# Patient Record
Sex: FEMALE | Race: WHITE | URBAN - METROPOLITAN AREA
[De-identification: names, ages, dates, MRNs, and addresses within clinical notes are randomized per-mention and may not be internally consistent; named-entity substitution may affect disease eponyms.]

---

## 2017-01-23 ENCOUNTER — MED REC SCAN ONLY (OUTPATIENT)
Dept: FAMILY MEDICINE CLINIC | Facility: CLINIC | Age: 77
End: 2017-01-23

## 2017-02-10 ENCOUNTER — ANTI-COAG VISIT (OUTPATIENT)
Dept: FAMILY MEDICINE CLINIC | Facility: CLINIC | Age: 77
End: 2017-02-10

## 2017-02-10 DIAGNOSIS — Z79.01 LONG TERM (CURRENT) USE OF ANTICOAGULANTS: Primary | ICD-10-CM

## 2017-02-10 DIAGNOSIS — I48.91 ATRIAL FIBRILLATION, UNSPECIFIED TYPE (HCC): ICD-10-CM

## 2017-02-10 LAB — INR: 1.9 (ref 0.8–1.2)

## 2017-02-10 PROCEDURE — 99211 OFF/OP EST MAY X REQ PHY/QHP: CPT | Performed by: FAMILY MEDICINE

## 2017-02-10 PROCEDURE — 85610 PROTHROMBIN TIME: CPT | Performed by: FAMILY MEDICINE

## 2017-02-10 NOTE — PROGRESS NOTES
INR just slightly low today. Used protonix for a few days and ate a moderate sized salad yesterday. INR last month was 2.7. Increase coumadin dose today as above. Return for INR in one month.

## 2017-02-15 ENCOUNTER — OFFICE VISIT (OUTPATIENT)
Dept: FAMILY MEDICINE CLINIC | Facility: CLINIC | Age: 77
End: 2017-02-15

## 2017-02-15 VITALS
BODY MASS INDEX: 42.36 KG/M2 | HEART RATE: 84 BPM | HEIGHT: 61 IN | TEMPERATURE: 97 F | SYSTOLIC BLOOD PRESSURE: 122 MMHG | WEIGHT: 224.38 LBS | DIASTOLIC BLOOD PRESSURE: 70 MMHG

## 2017-02-15 DIAGNOSIS — L01.00 IMPETIGO: Primary | ICD-10-CM

## 2017-02-15 PROBLEM — Z98.890 S/P BUNIONECTOMY: Status: ACTIVE | Noted: 2017-02-15

## 2017-02-15 PROCEDURE — 99213 OFFICE O/P EST LOW 20 MIN: CPT | Performed by: NURSE PRACTITIONER

## 2017-02-15 RX ORDER — WARFARIN SODIUM 1 MG/1
1 TABLET ORAL
COMMUNITY
Start: 2015-08-27 | End: 2017-05-30

## 2017-02-15 RX ORDER — MUPIROCIN CALCIUM 20 MG/G
1 CREAM TOPICAL 2 TIMES DAILY
Qty: 30 G | Refills: 0 | Status: SHIPPED | OUTPATIENT
Start: 2017-02-15 | End: 2017-02-22

## 2017-02-15 RX ORDER — CLOTRIMAZOLE AND BETAMETHASONE DIPROPIONATE 10; .64 MG/G; MG/G
CREAM TOPICAL
COMMUNITY
Start: 2014-08-29 | End: 2017-04-20 | Stop reason: ALTCHOICE

## 2017-02-15 RX ORDER — LOVASTATIN 20 MG/1
20 TABLET ORAL DAILY
COMMUNITY
Start: 2009-05-27 | End: 2017-05-04

## 2017-02-15 RX ORDER — LISINOPRIL 10 MG/1
10 TABLET ORAL DAILY
COMMUNITY
Start: 2008-06-25 | End: 2017-03-24

## 2017-02-15 RX ORDER — FUROSEMIDE 20 MG/1
20 TABLET ORAL DAILY
COMMUNITY
Start: 2015-06-23 | End: 2017-07-03

## 2017-02-15 RX ORDER — PANTOPRAZOLE SODIUM 40 MG/1
40 TABLET, DELAYED RELEASE ORAL DAILY
COMMUNITY
Start: 2013-06-21 | End: 2017-03-09 | Stop reason: ALTCHOICE

## 2017-02-15 RX ORDER — DILTIAZEM HYDROCHLORIDE 60 MG/1
60 TABLET, FILM COATED ORAL DAILY
COMMUNITY
Start: 2013-12-30 | End: 2018-03-16 | Stop reason: ALTCHOICE

## 2017-02-15 RX ORDER — MUPIROCIN CALCIUM 20 MG/G
1 CREAM TOPICAL 2 TIMES DAILY
Qty: 30 G | Refills: 0 | Status: SHIPPED | OUTPATIENT
Start: 2017-02-15 | End: 2017-02-15

## 2017-02-15 NOTE — PATIENT INSTRUCTIONS
Wash area with gentle soap without scent twice a day. Antibiotic ointment to affected area and nostrils 2 times a day for 7 days. This should not interact with coumadin. Vasoline to dry areas at night to help moisturize.   Avoid new lotions or make-ups to

## 2017-02-15 NOTE — PROGRESS NOTES
Danielle Serrano is a 77year old female. HPI:   C/o rash on face x 1-2 weeks around nose and in nose. States it is pruritic and burns inside nose and around nares. Notes some crusting of area, unsure about any drainage or exudate.  Had runny nose over denies shortness of breath with exertion  CARDIOVASCULAR: denies chest pain on exertion, hx a. fib  GI: denies abdominal pain and denies heartburn  NEURO: denies headaches    EXAM:   /70 mmHg  Pulse 84  Temp(Src) 97 °F (36.1 °C) (Tympanic)  Ht 61\"

## 2017-03-06 ENCOUNTER — TELEPHONE (OUTPATIENT)
Dept: FAMILY MEDICINE CLINIC | Facility: CLINIC | Age: 77
End: 2017-03-06

## 2017-03-06 NOTE — TELEPHONE ENCOUNTER
Seen in office by ZAHRAA Ram on 2/15/17 for impetigo. Prescribed topical bactroban. Now worsening after use. Now with red blotches around nose, above lips and on eyelids. Red areas itchy.  Denies difficulty breathing, swelling in mouth or throat, lesio

## 2017-03-06 NOTE — TELEPHONE ENCOUNTER
See note below, states she started on Lasix 2 wks ago and that is approx when s/s started. Pt states eye lids are puffy and swollen above lips- in location of rash.   Appt scheduled  With Allison Adrian NP tomorrow morning-  Pt urged to call for later today if n

## 2017-03-07 ENCOUNTER — TELEPHONE (OUTPATIENT)
Dept: FAMILY MEDICINE CLINIC | Facility: CLINIC | Age: 77
End: 2017-03-07

## 2017-03-07 ENCOUNTER — OFFICE VISIT (OUTPATIENT)
Dept: FAMILY MEDICINE CLINIC | Facility: CLINIC | Age: 77
End: 2017-03-07

## 2017-03-07 ENCOUNTER — LAB ENCOUNTER (OUTPATIENT)
Dept: LAB | Age: 77
End: 2017-03-07
Attending: FAMILY MEDICINE
Payer: COMMERCIAL

## 2017-03-07 VITALS
DIASTOLIC BLOOD PRESSURE: 78 MMHG | RESPIRATION RATE: 16 BRPM | HEIGHT: 60.5 IN | TEMPERATURE: 98 F | BODY MASS INDEX: 41.89 KG/M2 | HEART RATE: 78 BPM | WEIGHT: 219 LBS | SYSTOLIC BLOOD PRESSURE: 136 MMHG

## 2017-03-07 DIAGNOSIS — B96.89 BACTERIAL CONJUNCTIVITIS OF BOTH EYES: ICD-10-CM

## 2017-03-07 DIAGNOSIS — H10.9 BACTERIAL CONJUNCTIVITIS OF BOTH EYES: ICD-10-CM

## 2017-03-07 DIAGNOSIS — L30.9 DERMATITIS: ICD-10-CM

## 2017-03-07 DIAGNOSIS — H60.93 OTITIS EXTERNA OF BOTH EARS, UNSPECIFIED CHRONICITY, UNSPECIFIED TYPE: Primary | ICD-10-CM

## 2017-03-07 LAB
ALBUMIN SERPL-MCNC: 4.1 G/DL (ref 3.5–4.8)
ALP LIVER SERPL-CCNC: 69 U/L (ref 55–142)
ALT SERPL-CCNC: 26 U/L (ref 14–54)
AST SERPL-CCNC: 17 U/L (ref 15–41)
BASOPHILS # BLD AUTO: 0.06 X10(3) UL (ref 0–0.1)
BASOPHILS NFR BLD AUTO: 0.7 %
BILIRUB SERPL-MCNC: 0.4 MG/DL (ref 0.1–2)
BUN BLD-MCNC: 15 MG/DL (ref 8–20)
CALCIUM BLD-MCNC: 9.7 MG/DL (ref 8.3–10.3)
CHLORIDE: 102 MMOL/L (ref 101–111)
CO2: 30 MMOL/L (ref 22–32)
CREAT BLD-MCNC: 0.79 MG/DL (ref 0.55–1.02)
EOSINOPHIL # BLD AUTO: 0.28 X10(3) UL (ref 0–0.3)
EOSINOPHIL NFR BLD AUTO: 3.3 %
ERYTHROCYTE [DISTWIDTH] IN BLOOD BY AUTOMATED COUNT: 12.3 % (ref 11.5–16)
GLUCOSE BLD-MCNC: 98 MG/DL (ref 70–99)
HCT VFR BLD AUTO: 45.8 % (ref 34–50)
HGB BLD-MCNC: 15.3 G/DL (ref 12–16)
IMMATURE GRANULOCYTE COUNT: 0.02 X10(3) UL (ref 0–1)
IMMATURE GRANULOCYTE RATIO %: 0.2 %
LYMPHOCYTES # BLD AUTO: 1.67 X10(3) UL (ref 0.9–4)
LYMPHOCYTES NFR BLD AUTO: 19.4 %
M PROTEIN MFR SERPL ELPH: 8.3 G/DL (ref 6.1–8.3)
MCH RBC QN AUTO: 30.2 PG (ref 27–33.2)
MCHC RBC AUTO-ENTMCNC: 33.4 G/DL (ref 31–37)
MCV RBC AUTO: 90.5 FL (ref 81–100)
MONOCYTES # BLD AUTO: 0.63 X10(3) UL (ref 0.1–0.6)
MONOCYTES NFR BLD AUTO: 7.3 %
NEUTROPHIL ABS PRELIM: 5.95 X10 (3) UL (ref 1.3–6.7)
NEUTROPHILS # BLD AUTO: 5.95 X10(3) UL (ref 1.3–6.7)
NEUTROPHILS NFR BLD AUTO: 69.1 %
PLATELET # BLD AUTO: 303 10(3)UL (ref 150–450)
POTASSIUM SERPL-SCNC: 4.6 MMOL/L (ref 3.6–5.1)
RBC # BLD AUTO: 5.06 X10(6)UL (ref 3.8–5.1)
RED CELL DISTRIBUTION WIDTH-SD: 40.7 FL (ref 35.1–46.3)
SODIUM SERPL-SCNC: 138 MMOL/L (ref 136–144)
WBC # BLD AUTO: 8.6 X10(3) UL (ref 4–13)

## 2017-03-07 PROCEDURE — 99214 OFFICE O/P EST MOD 30 MIN: CPT | Performed by: NURSE PRACTITIONER

## 2017-03-07 PROCEDURE — 80053 COMPREHEN METABOLIC PANEL: CPT

## 2017-03-07 PROCEDURE — 85025 COMPLETE CBC W/AUTO DIFF WBC: CPT

## 2017-03-07 RX ORDER — OFLOXACIN 3 MG/ML
10 SOLUTION AURICULAR (OTIC) DAILY
Qty: 5 ML | Refills: 0 | Status: SHIPPED | OUTPATIENT
Start: 2017-03-07 | End: 2017-03-09

## 2017-03-07 RX ORDER — HYDROCORTISONE 25 MG/ML
1 LOTION TOPICAL 2 TIMES DAILY
Qty: 59 ML | Refills: 0 | Status: SHIPPED | OUTPATIENT
Start: 2017-03-07 | End: 2021-01-21

## 2017-03-07 RX ORDER — METHYLPREDNISOLONE 4 MG/1
TABLET ORAL
Qty: 1 KIT | Refills: 0 | Status: SHIPPED | OUTPATIENT
Start: 2017-03-07 | End: 2017-04-20 | Stop reason: ALTCHOICE

## 2017-03-07 RX ORDER — POLYMYXIN B SULFATE AND TRIMETHOPRIM 1; 10000 MG/ML; [USP'U]/ML
1 SOLUTION OPHTHALMIC EVERY 4 HOURS
Qty: 10 ML | Refills: 0 | Status: SHIPPED | OUTPATIENT
Start: 2017-03-07 | End: 2017-03-14

## 2017-03-07 NOTE — PROGRESS NOTES
Winter Seo is a 77year old female. HPI:   Pt presents for c/o: Rash above the lip and eyelids x 2-3 weeks. Pt reports bilateral eye redness and pruritis. No eye d/c or crusting, denies vision changes, eye pain with movement, fevers, chills.  No Cream Apply topically. Apply to affected are tid as needed Disp:  Rfl:    DiltiaZEM HCl (CARDIZEM) 60 MG Oral Tab Take 60 mg by mouth daily. Disp:  Rfl:    lisinopril 10 MG Oral Tab Take 10 mg by mouth daily.  Disp:  Rfl:    Lovastatin 20 MG Oral Tab Take 2 auscultation, no crackles, wheezing, rhonchi  CARDIO:irregular rate without murmur  EXTREMITIES: no cyanosis, clubbing or edema    ASSESSMENT AND PLAN:   Otitis externa bilateral ears  Dermatitis  Bacterial conjunctivitis bilaterally      Polymyxin B/trime

## 2017-03-07 NOTE — TELEPHONE ENCOUNTER
Patient called back. She located the directions on the box of medication. No further questions at this time.

## 2017-03-07 NOTE — PATIENT INSTRUCTIONS
Azithromycin eye drops as prescribed. Ofloxacin ear drops as prescribed. Medrol dose as prescribed for itching, inflammation, and swelling. Hydrocortisone cream to upper lip twice a day x 2 weeks. Eucerin cream otc for moisturizing face.   Follow up in

## 2017-03-08 ENCOUNTER — TELEPHONE (OUTPATIENT)
Dept: FAMILY MEDICINE CLINIC | Facility: CLINIC | Age: 77
End: 2017-03-08

## 2017-03-08 NOTE — TELEPHONE ENCOUNTER
----- Message from ZAHRAA Mccray sent at 3/8/2017  7:00 AM CST -----  Blood work WNLs except for monocyte absolute level slightly elevated--this is more than likely due to the inflammation and irritation skin, eyes, and ears as discussed in last OV.    P

## 2017-03-08 NOTE — TELEPHONE ENCOUNTER
Noted, thank you. Pt to start antibiotic gtts for ears and eyes today and use steroid cream as prescribed. Continue with plan of care as discussed in LOV.  Pt to got to ER if worsening eye swelling, eye pain, vision changes, fever, chills, n/v/d, or tro

## 2017-03-08 NOTE — TELEPHONE ENCOUNTER
Patient advised. States is still itching. She thinks that the steroids are helping. Nose and around mouth are itching. Right eye is still swollen. Not having any problems with meds. Took Benadryl last night.   States that she has not started eye renetta

## 2017-03-09 ENCOUNTER — OFFICE VISIT (OUTPATIENT)
Dept: FAMILY MEDICINE CLINIC | Facility: CLINIC | Age: 77
End: 2017-03-09

## 2017-03-09 ENCOUNTER — TELEPHONE (OUTPATIENT)
Dept: FAMILY MEDICINE CLINIC | Facility: CLINIC | Age: 77
End: 2017-03-09

## 2017-03-09 ENCOUNTER — ANTI-COAG VISIT (OUTPATIENT)
Dept: FAMILY MEDICINE CLINIC | Facility: CLINIC | Age: 77
End: 2017-03-09

## 2017-03-09 VITALS
HEART RATE: 70 BPM | SYSTOLIC BLOOD PRESSURE: 110 MMHG | TEMPERATURE: 99 F | WEIGHT: 218.38 LBS | DIASTOLIC BLOOD PRESSURE: 62 MMHG | HEIGHT: 61 IN | OXYGEN SATURATION: 97 % | BODY MASS INDEX: 41.23 KG/M2

## 2017-03-09 DIAGNOSIS — L30.9 DERMATITIS: ICD-10-CM

## 2017-03-09 DIAGNOSIS — Z79.01 LONG TERM (CURRENT) USE OF ANTICOAGULANTS: Primary | ICD-10-CM

## 2017-03-09 DIAGNOSIS — H10.9 BACTERIAL CONJUNCTIVITIS OF BOTH EYES: ICD-10-CM

## 2017-03-09 DIAGNOSIS — B96.89 BACTERIAL CONJUNCTIVITIS OF BOTH EYES: ICD-10-CM

## 2017-03-09 DIAGNOSIS — Z09 FOLLOW UP: Primary | ICD-10-CM

## 2017-03-09 DIAGNOSIS — H60.93 OTITIS EXTERNA OF BOTH EARS, UNSPECIFIED CHRONICITY, UNSPECIFIED TYPE: ICD-10-CM

## 2017-03-09 DIAGNOSIS — I48.91 ATRIAL FIBRILLATION, UNSPECIFIED TYPE (HCC): ICD-10-CM

## 2017-03-09 LAB — INR: 2.8 (ref 0.8–1.2)

## 2017-03-09 PROCEDURE — 85610 PROTHROMBIN TIME: CPT | Performed by: FAMILY MEDICINE

## 2017-03-09 PROCEDURE — 99213 OFFICE O/P EST LOW 20 MIN: CPT | Performed by: NURSE PRACTITIONER

## 2017-03-09 NOTE — PROGRESS NOTES
INR in goal range. Just started prednisone and benadryl. Will keep coumadin dose the same for now. INR in 2 days.

## 2017-03-09 NOTE — PROGRESS NOTES
Bassem Aiken is a 77year old female. HPI:   Pt presents for f/u on dermatitis on face, otitis externa bilaterally, and bacterial conjunctivitis bilaterally.  Pt started medrol dose pack 2 days ago, started antibiotic eyedrops yesterday evening, morning. Patient is asking how much water she should be drinking, as she thinks she was instructed to increase water intake while on the water pill.   Denies any dysuria, suprapubic tenderness, back pain, nausea, vomiting, diarrhea, chest pain, shortness o denies abdominal pain and denies heartburn  NEURO: denies headaches    EXAM:   /62 mmHg  Pulse 70  Temp(Src) 98.9 °F (37.2 °C) (Tympanic)  Ht 61\"  Wt 218 lb 6 oz  BMI 41.28 kg/m2  SpO2 97%  GENERAL: well developed, well nourished,in no apparent dist skin.  Continue to hold on using bilateral hearing aids to let ear canals breathes while there is infection present. The patient indicates understanding of these issues and agrees to the plan. The patient is asked to return in  5 days.     Shane Herrera

## 2017-03-09 NOTE — TELEPHONE ENCOUNTER
Informed pt of the recommendations. Pt will go to er if sxs worsen. Pt expressed understanding and thanks.

## 2017-03-09 NOTE — PATIENT INSTRUCTIONS
Finish medrol dose pack, take with food. Continue benadryl at night. Continue antibiotic ear drops x 7 days and eye drops x 7 days. Eucerin cream a couple times a day for moisturizing face. Steroid lotion twice a day for total of 2 weeks.   Return in 5

## 2017-03-11 ENCOUNTER — ANTI-COAG VISIT (OUTPATIENT)
Dept: FAMILY MEDICINE CLINIC | Facility: CLINIC | Age: 77
End: 2017-03-11

## 2017-03-11 DIAGNOSIS — Z79.01 LONG TERM (CURRENT) USE OF ANTICOAGULANTS: Primary | ICD-10-CM

## 2017-03-11 DIAGNOSIS — I48.91 ATRIAL FIBRILLATION, UNSPECIFIED TYPE (HCC): ICD-10-CM

## 2017-03-11 LAB — INR: 3.3 (ref 0.8–1.2)

## 2017-03-11 PROCEDURE — 85610 PROTHROMBIN TIME: CPT | Performed by: FAMILY MEDICINE

## 2017-03-11 PROCEDURE — 99211 OFF/OP EST MAY X REQ PHY/QHP: CPT | Performed by: FAMILY MEDICINE

## 2017-03-11 NOTE — PATIENT INSTRUCTIONS
INR is starting to climb on new meds. Eat cabbage tonight or another dark leafy green vegtable. Decrease coumadin dose today. Follow dosing calender.

## 2017-03-11 NOTE — PROGRESS NOTES
INR 3.3, climbing on prednisone. Has one days dose of prednisone left. Current coumadin dose 8mg daily. Take coumadin 4mg today, then resume 8mg daily.    Next INR in 4 days when she comes in for f/u visit with Skylar Ramirez, NP

## 2017-03-15 ENCOUNTER — OFFICE VISIT (OUTPATIENT)
Dept: FAMILY MEDICINE CLINIC | Facility: CLINIC | Age: 77
End: 2017-03-15

## 2017-03-15 ENCOUNTER — ANTI-COAG VISIT (OUTPATIENT)
Dept: FAMILY MEDICINE CLINIC | Facility: CLINIC | Age: 77
End: 2017-03-15

## 2017-03-15 VITALS
DIASTOLIC BLOOD PRESSURE: 62 MMHG | TEMPERATURE: 97 F | BODY MASS INDEX: 42.27 KG/M2 | RESPIRATION RATE: 16 BRPM | HEIGHT: 60.5 IN | WEIGHT: 221 LBS | HEART RATE: 70 BPM | SYSTOLIC BLOOD PRESSURE: 134 MMHG

## 2017-03-15 DIAGNOSIS — H60.93 OTITIS EXTERNA OF BOTH EARS, UNSPECIFIED CHRONICITY, UNSPECIFIED TYPE: ICD-10-CM

## 2017-03-15 DIAGNOSIS — H10.9 CONJUNCTIVITIS OF BOTH EYES, UNSPECIFIED CONJUNCTIVITIS TYPE: ICD-10-CM

## 2017-03-15 DIAGNOSIS — Z09 FOLLOW UP: ICD-10-CM

## 2017-03-15 DIAGNOSIS — I48.91 ATRIAL FIBRILLATION, UNSPECIFIED TYPE (HCC): ICD-10-CM

## 2017-03-15 DIAGNOSIS — L30.9 DERMATITIS: Primary | ICD-10-CM

## 2017-03-15 DIAGNOSIS — Z79.01 LONG TERM (CURRENT) USE OF ANTICOAGULANTS: Primary | ICD-10-CM

## 2017-03-15 LAB — INR: 3.6 (ref 0.8–1.2)

## 2017-03-15 PROCEDURE — 99213 OFFICE O/P EST LOW 20 MIN: CPT | Performed by: NURSE PRACTITIONER

## 2017-03-15 PROCEDURE — 85610 PROTHROMBIN TIME: CPT | Performed by: FAMILY MEDICINE

## 2017-03-15 NOTE — PATIENT INSTRUCTIONS
INR elevated today, hold coumadin tonight, resume regular coumadin dose of 8mg daily. Recheck INR in 2-3 days. Monitor for signs of bleeding. Continue avoiding the earring, travel pillow, and other potential irritants. Finish ear drops completely.    Sto

## 2017-03-15 NOTE — PROGRESS NOTES
Angelo Hinojosa is a 77year old female. HPI:   Pt presents for f/u on dermatitis on face, otitis externa bilaterally, and bacterial conjunctivitis bilaterally.  Pt has completed medrol dosepak and has one day left of antibioitic ear drops --tolera sparingly to affected area twice a day as needed Disp: 59 mL Rfl: 0   DiltiaZEM HCl (CARDIZEM) 60 MG Oral Tab Take 60 mg by mouth daily. Disp:  Rfl:    lisinopril 10 MG Oral Tab Take 10 mg by mouth daily.  Disp:  Rfl:    Lovastatin 20 MG Oral Tab Take 20 mg noted, no edema or erythematous area around eyes. NECK: supple,no adenopathy  LUNGS: clear to auscultation, no crackles, wheezing, rhonchi  CARDIO:irregular rate without murmur  EXTREMITIES: no cyanosis, clubbing.  Mild trace pitting bilateral lower extrem

## 2017-03-17 ENCOUNTER — ANTI-COAG VISIT (OUTPATIENT)
Dept: FAMILY MEDICINE CLINIC | Facility: CLINIC | Age: 77
End: 2017-03-17

## 2017-03-17 DIAGNOSIS — I48.20 CHRONIC ATRIAL FIBRILLATION (HCC): ICD-10-CM

## 2017-03-17 DIAGNOSIS — Z79.01 LONG TERM (CURRENT) USE OF ANTICOAGULANTS: Primary | ICD-10-CM

## 2017-03-17 LAB — INR: 1.8 (ref 0.8–1.2)

## 2017-03-17 PROCEDURE — 85610 PROTHROMBIN TIME: CPT | Performed by: FAMILY MEDICINE

## 2017-03-17 PROCEDURE — 99211 OFF/OP EST MAY X REQ PHY/QHP: CPT | Performed by: FAMILY MEDICINE

## 2017-03-17 NOTE — PROGRESS NOTES
Patient given coumadin instructions per Ginny Clark  Patient expressed understanding  Patient given AVS with coumadin dose calendar    Terrilyn Meigs Minor, 03/17/2017, 1:27 PM

## 2017-03-17 NOTE — PROGRESS NOTES
INR low today. Take 10 mg today, then resume 8 mg daily. Recheck next week.  Ginny Clark, 03/17/2017, 1:21 PM

## 2017-03-17 NOTE — PROGRESS NOTES
[place] INR 1.8  Was 3.6 on 3/15/17  Held coumadin on 3/15/17 but took 8 mg last night  Please manage coumadin  Patient normal dose is 8 mg daily    Evelin Sapp, 03/17/2017, 1:13 PM

## 2017-03-24 ENCOUNTER — ANTI-COAG VISIT (OUTPATIENT)
Dept: FAMILY MEDICINE CLINIC | Facility: CLINIC | Age: 77
End: 2017-03-24

## 2017-03-24 DIAGNOSIS — Z71.1 PERSON WITH FEARED COMPLAINT IN WHOM NO DIAGNOSIS WAS MADE: ICD-10-CM

## 2017-03-24 DIAGNOSIS — Z79.01 LONG TERM (CURRENT) USE OF ANTICOAGULANTS: Primary | ICD-10-CM

## 2017-03-24 DIAGNOSIS — I48.20 CHRONIC ATRIAL FIBRILLATION (HCC): ICD-10-CM

## 2017-03-24 LAB — INR: 2.6 (ref 0.8–1.2)

## 2017-03-24 PROCEDURE — 99211 OFF/OP EST MAY X REQ PHY/QHP: CPT | Performed by: FAMILY MEDICINE

## 2017-03-24 PROCEDURE — 85610 PROTHROMBIN TIME: CPT | Performed by: FAMILY MEDICINE

## 2017-03-24 RX ORDER — LISINOPRIL 10 MG/1
TABLET ORAL
Qty: 90 TABLET | Refills: 0 | Status: SHIPPED | OUTPATIENT
Start: 2017-03-24 | End: 2017-07-03

## 2017-03-24 NOTE — TELEPHONE ENCOUNTER
Future Appointments  Date Time Provider Cora Palafox   4/7/2017 9:00 AM [facility] COUMADIN NURSE [facility] [place] [facility] [place]

## 2017-04-07 ENCOUNTER — ANTI-COAG VISIT (OUTPATIENT)
Dept: FAMILY MEDICINE CLINIC | Facility: CLINIC | Age: 77
End: 2017-04-07

## 2017-04-07 VITALS
TEMPERATURE: 98 F | DIASTOLIC BLOOD PRESSURE: 74 MMHG | HEART RATE: 62 BPM | RESPIRATION RATE: 14 BRPM | SYSTOLIC BLOOD PRESSURE: 130 MMHG

## 2017-04-07 DIAGNOSIS — Z79.01 LONG TERM (CURRENT) USE OF ANTICOAGULANTS: Primary | ICD-10-CM

## 2017-04-07 DIAGNOSIS — I48.20 CHRONIC ATRIAL FIBRILLATION (HCC): ICD-10-CM

## 2017-04-07 PROCEDURE — 99211 OFF/OP EST MAY X REQ PHY/QHP: CPT | Performed by: FAMILY MEDICINE

## 2017-04-07 PROCEDURE — 85610 PROTHROMBIN TIME: CPT | Performed by: FAMILY MEDICINE

## 2017-04-07 RX ORDER — CETIRIZINE HYDROCHLORIDE 5 MG/1
5 TABLET ORAL DAILY
COMMUNITY
End: 2017-04-20 | Stop reason: ALTCHOICE

## 2017-04-07 NOTE — PROGRESS NOTES
INR is slightly elevated at 3.1. May be due to diet changes. Decrease coumadin dose today, then resume maintenance dose. See dosing calender. Resume regular diet. INR in 2 weeks. AVS printed and given to patient and/or family member.    Coumadin in

## 2017-04-20 ENCOUNTER — ANTI-COAG VISIT (OUTPATIENT)
Dept: FAMILY MEDICINE CLINIC | Facility: CLINIC | Age: 77
End: 2017-04-20

## 2017-04-20 VITALS
DIASTOLIC BLOOD PRESSURE: 78 MMHG | HEART RATE: 84 BPM | TEMPERATURE: 98 F | RESPIRATION RATE: 18 BRPM | SYSTOLIC BLOOD PRESSURE: 142 MMHG

## 2017-04-20 DIAGNOSIS — Z79.01 LONG TERM (CURRENT) USE OF ANTICOAGULANTS: Primary | ICD-10-CM

## 2017-04-20 DIAGNOSIS — I48.20 CHRONIC ATRIAL FIBRILLATION (HCC): ICD-10-CM

## 2017-04-20 PROCEDURE — 85610 PROTHROMBIN TIME: CPT | Performed by: FAMILY MEDICINE

## 2017-04-20 PROCEDURE — 99211 OFF/OP EST MAY X REQ PHY/QHP: CPT | Performed by: FAMILY MEDICINE

## 2017-04-20 NOTE — PROGRESS NOTES
INR is in goal. CPM coumadin. INR in one month. AVS printed and given to patient and/or family member. Coumadin instructions explained and patient and/or family member and/or care giver verbalize understanding.

## 2017-05-04 RX ORDER — LOVASTATIN 20 MG/1
TABLET ORAL
Qty: 90 TABLET | Refills: 1 | Status: SHIPPED | OUTPATIENT
Start: 2017-05-04

## 2017-05-04 NOTE — TELEPHONE ENCOUNTER
Last Labs:  10/20/2016  Last OV:  10/7/2016  Future Appointments  Date Time Provider Cora Palafox   5/18/2017 9:30 AM [facility] COUMADIN NURSE [facility] [place] [facility] [place] Chen, 05/04/2017, 10:28 AM

## 2017-05-18 ENCOUNTER — ANTI-COAG VISIT (OUTPATIENT)
Dept: FAMILY MEDICINE CLINIC | Facility: CLINIC | Age: 77
End: 2017-05-18

## 2017-05-18 VITALS
TEMPERATURE: 99 F | RESPIRATION RATE: 20 BRPM | DIASTOLIC BLOOD PRESSURE: 70 MMHG | SYSTOLIC BLOOD PRESSURE: 122 MMHG | HEART RATE: 80 BPM

## 2017-05-18 DIAGNOSIS — Z79.01 LONG TERM (CURRENT) USE OF ANTICOAGULANTS: Primary | ICD-10-CM

## 2017-05-18 DIAGNOSIS — I48.20 CHRONIC ATRIAL FIBRILLATION (HCC): ICD-10-CM

## 2017-05-18 PROCEDURE — 85610 PROTHROMBIN TIME: CPT | Performed by: FAMILY MEDICINE

## 2017-05-18 PROCEDURE — 99211 OFF/OP EST MAY X REQ PHY/QHP: CPT | Performed by: FAMILY MEDICINE

## 2017-05-18 NOTE — PATIENT INSTRUCTIONS
INR is good at 2.8. INR is in goal range. Continue same coumadin dosage. Watch for bleeding problems such as black tarry stools, blood in urine, frequent or prolonged nose bleeds, unusual bruising, or any other unusual bleeding.  Call or seek medical

## 2017-05-18 NOTE — PROGRESS NOTES
INR in goal range at 2.8. CPM coumadin. INR in one month. AVS printed and given to patient and/or family member. Coumadin instructions explained and patient and/or family member and/or care giver verbalize understanding.

## 2017-05-30 RX ORDER — WARFARIN SODIUM 1 MG/1
TABLET ORAL
Qty: 180 TABLET | Refills: 1 | Status: SHIPPED | OUTPATIENT
Start: 2017-05-30 | End: 2017-08-25

## 2017-05-30 NOTE — TELEPHONE ENCOUNTER
Future Appointments  Date Time Provider Cora Palafox   6/15/2017 9:30 AM [facility] COUMADIN NURSE [facility] [place] [facility] [place]     Last INR: 5/18/17  Last appt:  10/7/16  Last lab: 3/7/17

## 2017-06-15 ENCOUNTER — TELEPHONE (OUTPATIENT)
Dept: FAMILY MEDICINE CLINIC | Facility: CLINIC | Age: 77
End: 2017-06-15

## 2017-06-16 ENCOUNTER — TELEPHONE (OUTPATIENT)
Dept: FAMILY MEDICINE CLINIC | Facility: CLINIC | Age: 77
End: 2017-06-16

## 2017-06-16 NOTE — TELEPHONE ENCOUNTER
Patient had Medicare Exam 9/2/16. Patient is aware of Her Exam Dates. Received a phone call from Cameron from [facility] regarding need to Schedule Px.   Jose Manuel Bunch, 06/16/2017, 9:55 AM

## 2017-06-16 NOTE — TELEPHONE ENCOUNTER
----- Message from Sohail Alejandre sent at 6/16/2017  9:41 AM CDT -----  Contact: DEYSI LUI   Please call back at [phone]

## 2017-06-19 ENCOUNTER — ANTI-COAG VISIT (OUTPATIENT)
Dept: FAMILY MEDICINE CLINIC | Facility: CLINIC | Age: 77
End: 2017-06-19

## 2017-06-19 VITALS
RESPIRATION RATE: 15 BRPM | TEMPERATURE: 98 F | DIASTOLIC BLOOD PRESSURE: 70 MMHG | SYSTOLIC BLOOD PRESSURE: 130 MMHG | HEART RATE: 70 BPM

## 2017-06-19 DIAGNOSIS — Z79.01 LONG TERM (CURRENT) USE OF ANTICOAGULANTS: Primary | ICD-10-CM

## 2017-06-19 DIAGNOSIS — I48.20 CHRONIC ATRIAL FIBRILLATION (HCC): ICD-10-CM

## 2017-06-19 PROCEDURE — 85610 PROTHROMBIN TIME: CPT | Performed by: FAMILY MEDICINE

## 2017-06-19 PROCEDURE — 99211 OFF/OP EST MAY X REQ PHY/QHP: CPT | Performed by: FAMILY MEDICINE

## 2017-06-19 NOTE — PROGRESS NOTES
INR in goal range at 3.0. CPM coumadin. INR in 2 weeks. Will get cortisone injection in spine end of this week and in feet next week. AVS printed and given to patient and/or family member.    Coumadin instructions explained and patient and/or famil

## 2017-07-03 RX ORDER — FUROSEMIDE 20 MG/1
TABLET ORAL
Qty: 30 TABLET | Refills: 0 | Status: SHIPPED | OUTPATIENT
Start: 2017-07-03 | End: 2018-03-24 | Stop reason: ALTCHOICE

## 2017-07-03 RX ORDER — LISINOPRIL 10 MG/1
TABLET ORAL
Qty: 90 TABLET | Refills: 0 | Status: SHIPPED | OUTPATIENT
Start: 2017-07-03 | End: 2018-03-16 | Stop reason: ALTCHOICE

## 2017-07-03 NOTE — TELEPHONE ENCOUNTER
Future Appointments  Date Time Provider Cora Palafox   7/6/2017 9:15 AM [facility] COUMADIN NURSE [facility] [place] [facility] [place]     Last INR: 5/18/17  Last appt:  10/7/16  Last lab: 3/7/17

## 2017-07-05 ENCOUNTER — TELEPHONE (OUTPATIENT)
Dept: FAMILY MEDICINE CLINIC | Facility: CLINIC | Age: 77
End: 2017-07-05

## 2017-07-05 RX ORDER — WARFARIN SODIUM 5 MG/1
TABLET ORAL
Qty: 90 TABLET | Refills: 1 | Status: SHIPPED | OUTPATIENT
Start: 2017-07-05 | End: 2017-08-07

## 2017-07-05 RX ORDER — WARFARIN SODIUM 6 MG/1
6 TABLET ORAL DAILY
Refills: 0 | Status: CANCELLED | OUTPATIENT
Start: 2017-07-05

## 2017-07-05 NOTE — TELEPHONE ENCOUNTER
5 mg coumadin refilled earlier today- please clarify med dose kahlil jc or Rowan BLUM before further refills. Med list does not reflect this.

## 2017-07-05 NOTE — TELEPHONE ENCOUNTER
Last Labs:  3/7/2017  Last OV:  10/7/2016  Future Appointments  Date Time Provider Cora Palafox   7/6/2017 9:15 AM [facility] COUMADIN NURSE [facility] [place] [facility], 07/05/17, 7:43 AM

## 2017-07-05 NOTE — TELEPHONE ENCOUNTER
Last INR: 6/19/17  Last appt : 10/7/16  Last lab: 10/20/16    Future Appointments  Date Time Provider Cora Palafox   7/6/2017 9:15 AM [facility] COUMADIN NURSE [facility] [place] [facility] [place]

## 2017-07-05 NOTE — TELEPHONE ENCOUNTER
According to EMR patient had a coumadin 6mg tablet and 1mg tablet. Current coumadin dose is listed as 8mg daily.  ( one 6mg tab, and two 1mg tabs daily)    Message left for patient to return call to confirm dose and clarify rx request?

## 2017-07-06 ENCOUNTER — ANTI-COAG VISIT (OUTPATIENT)
Dept: FAMILY MEDICINE CLINIC | Facility: CLINIC | Age: 77
End: 2017-07-06

## 2017-07-06 DIAGNOSIS — Z79.01 LONG TERM (CURRENT) USE OF ANTICOAGULANTS: ICD-10-CM

## 2017-07-06 DIAGNOSIS — I48.20 CHRONIC ATRIAL FIBRILLATION (HCC): ICD-10-CM

## 2017-07-06 LAB — INR: 2 (ref 0.8–1.2)

## 2017-07-06 PROCEDURE — 85610 PROTHROMBIN TIME: CPT | Performed by: FAMILY MEDICINE

## 2017-07-06 PROCEDURE — 99211 OFF/OP EST MAY X REQ PHY/QHP: CPT | Performed by: FAMILY MEDICINE

## 2017-07-06 NOTE — PROGRESS NOTES
INR in goal range at 2.0. Missed coumadin dose this past week. Liam Martin and fractured vertebrae in back. MRI today. Will have cortisone injections in feet next week. INR in 2 weeks. AVS printed and given to patient and/or family member.    Coum

## 2017-07-06 NOTE — TELEPHONE ENCOUNTER
Patient confirmed today at INR appointment that she has been using the 6mg tablets and confirmed that she received the 6mg tablets from the pharmacy.

## 2017-07-07 ENCOUNTER — TELEPHONE (OUTPATIENT)
Dept: FAMILY MEDICINE CLINIC | Facility: CLINIC | Age: 77
End: 2017-07-07

## 2017-07-07 NOTE — TELEPHONE ENCOUNTER
1. Pt states she fell down the steps last Saturday- pt is being seen per Dr. Benson Newberry, states she was told that she has a back fracture-pt had MRI. Pt states she was given 2 options-  1. To rest for next 3-6 months and watch to see if fx heals, or, 2.  Pt s

## 2017-07-11 ENCOUNTER — TELEPHONE (OUTPATIENT)
Dept: FAMILY MEDICINE CLINIC | Facility: CLINIC | Age: 77
End: 2017-07-11

## 2017-07-11 NOTE — TELEPHONE ENCOUNTER
See note from 7/7- pt has back fracture and is following up with back specilty at [facility]. Pt needs bone density test, but [facility] is not able to do until 8/1.  Pt   Would like to have order faxed to us to complete order her, pt has appt on Thursday 7/13 with Dr. Anshul Springer

## 2017-07-18 ENCOUNTER — ANTI-COAG VISIT (OUTPATIENT)
Dept: FAMILY MEDICINE CLINIC | Facility: CLINIC | Age: 77
End: 2017-07-18

## 2017-07-18 VITALS
OXYGEN SATURATION: 99 % | TEMPERATURE: 98 F | DIASTOLIC BLOOD PRESSURE: 68 MMHG | HEART RATE: 73 BPM | SYSTOLIC BLOOD PRESSURE: 122 MMHG

## 2017-07-18 DIAGNOSIS — I48.20 CHRONIC ATRIAL FIBRILLATION (HCC): ICD-10-CM

## 2017-07-18 DIAGNOSIS — Z79.01 LONG TERM (CURRENT) USE OF ANTICOAGULANTS: ICD-10-CM

## 2017-07-18 LAB — INR: 3 (ref 0.8–1.2)

## 2017-07-18 PROCEDURE — 99211 OFF/OP EST MAY X REQ PHY/QHP: CPT | Performed by: FAMILY MEDICINE

## 2017-07-18 PROCEDURE — 85610 PROTHROMBIN TIME: CPT | Performed by: FAMILY MEDICINE

## 2017-07-18 NOTE — PROGRESS NOTES
INR in goal range at 3.0  Ate less vegetables this week. Will resume regular diet. Cortisone injections to feet are on hold at this time. Possible back surgery and dental work. Not scheduled yet. Back surgery pending healing of fracture.        CPM couma

## 2017-07-18 NOTE — PATIENT INSTRUCTIONS
INR is in goal range. Continue same coumadin dosage. Watch for bleeding problems such as black tarry stools, blood in urine, frequent or prolonged nose bleeds, unusual bruising, or any other unusual bleeding.  Seek medical attention if any bleeding prob

## 2017-07-19 ENCOUNTER — TELEPHONE (OUTPATIENT)
Dept: FAMILY MEDICINE CLINIC | Facility: CLINIC | Age: 77
End: 2017-07-19

## 2017-07-19 NOTE — TELEPHONE ENCOUNTER
Pt asked if we have received order for bone density from [facility]- pt informed does not appear that we have. Pt will request  To have order resent.

## 2017-07-21 ENCOUNTER — HOSPITAL ENCOUNTER (OUTPATIENT)
Dept: BONE DENSITY | Age: 77
Discharge: HOME | End: 2017-07-21
Attending: PHYSICAL MEDICINE & REHABILITATION
Payer: MEDICARE

## 2017-07-21 ENCOUNTER — TELEPHONE (OUTPATIENT)
Dept: FAMILY MEDICINE CLINIC | Facility: CLINIC | Age: 77
End: 2017-07-21

## 2017-07-21 DIAGNOSIS — M80.88XA OSTEOPOROTIC COMPRESSION FRACTURE OF SPINE, INITIAL ENCOUNTER: ICD-10-CM

## 2017-07-21 PROCEDURE — 77080 DXA BONE DENSITY AXIAL: CPT | Performed by: PHYSICAL MEDICINE & REHABILITATION

## 2017-07-21 NOTE — TELEPHONE ENCOUNTER
Pt with physical sept 2016- Pt with no cardiac or medical f.u, other than prob focus visits since that time. Pt would need appt for prescription and medica f.u.

## 2017-07-21 NOTE — TELEPHONE ENCOUNTER
Pt is scheduled for steroid epidural on Thursday with Dr. Scott Jeans. Pt is on Coumadin, AASH[facility] called Dr. Kalyan Elliott office- received approval to stop Coumadin tomorrow for 5 days prior to injection.   However, Dr. Kalyan Elliott office stated that PCP would need to issue

## 2017-07-28 ENCOUNTER — TELEPHONE (OUTPATIENT)
Dept: FAMILY MEDICINE CLINIC | Facility: CLINIC | Age: 77
End: 2017-07-28

## 2017-07-28 NOTE — TELEPHONE ENCOUNTER
1. Pt is asking to have Dexa Report faxed to Dr. Olamide Francis. 2. Pt states she was told that she needs appt to f/u re: getting Lovenox bridge prior to steroid epidural per Dr. Olamide Francis.   Pt states since CR is out of the office , she will get scheduled with laney

## 2017-08-07 ENCOUNTER — OFFICE VISIT (OUTPATIENT)
Dept: FAMILY MEDICINE CLINIC | Facility: CLINIC | Age: 77
End: 2017-08-07

## 2017-08-07 VITALS
WEIGHT: 224 LBS | TEMPERATURE: 98 F | BODY MASS INDEX: 43 KG/M2 | RESPIRATION RATE: 14 BRPM | HEART RATE: 90 BPM | DIASTOLIC BLOOD PRESSURE: 70 MMHG | SYSTOLIC BLOOD PRESSURE: 122 MMHG

## 2017-08-07 DIAGNOSIS — I48.20 CHRONIC ATRIAL FIBRILLATION (HCC): Primary | ICD-10-CM

## 2017-08-07 DIAGNOSIS — R60.9 EDEMA, UNSPECIFIED TYPE: ICD-10-CM

## 2017-08-07 DIAGNOSIS — S32.020D COMPRESSION FRACTURE OF L2, WITH ROUTINE HEALING, SUBSEQUENT ENCOUNTER: ICD-10-CM

## 2017-08-07 PROCEDURE — 99214 OFFICE O/P EST MOD 30 MIN: CPT | Performed by: FAMILY MEDICINE

## 2017-08-07 NOTE — PROGRESS NOTES
[facility] [place]  PROGRESS NOTE  Chief Complaint:   Patient presents with:  Medication Request: Lovonox script      HPI:   This is a 69year old female coming in for anticoagulation discussion.   Patient recently was diagnosed having compressio MOUTH EVERY DAY Disp: 90 tablet Rfl: 0   FUROSEMIDE 20 MG Oral Tab TAKE ONE TABLET BY MOUTH EVERY MORNING FOR 3 DAYS THEN AS NEEDED Disp: 30 tablet Rfl: 0   WARFARIN SODIUM 1 MG Oral Tab TAKE 2 TABLETS BY MOUTH EVERY DAY Disp: 180 tablet Rfl: 1   LOVASTATI groomed. Physical Exam:  GEN:  Patient is alert, awake and oriented, well developed, well nourished  NECK: Supple, no JVD, no thyromegaly. SKIN: No rashes, no skin lesion, no bruising, good turgor. HEART: Irregular irregular rhythm.   Heart rate 78  LUNG symptoms. Patient is to call with any side effects or complications from the treatments as a result of today.      Problem List:  Patient Active Problem List:     Long term (current) use of anticoagulants [Z79.01]     Atrial fibrillation ([facility] [I48.91]

## 2017-08-07 NOTE — PATIENT INSTRUCTIONS
Lovenox bridging instructions:    Stop coumadin 5 days prior to surgery. INR the day prior to surgery  On Day 3 prior to surgery begin Lovenox injection 100mg daily in AM.  Continue with injection daily on Day 2 and Day 1 prior to surgery.   Then No coumad

## 2017-08-14 ENCOUNTER — TELEPHONE (OUTPATIENT)
Dept: FAMILY MEDICINE CLINIC | Facility: CLINIC | Age: 77
End: 2017-08-14

## 2017-08-14 RX ORDER — ENOXAPARIN SODIUM 100 MG/ML
INJECTION SUBCUTANEOUS
Qty: 7 ML | Refills: 1 | Status: SHIPPED | OUTPATIENT
Start: 2017-08-14 | End: 2017-08-29 | Stop reason: ALTCHOICE

## 2017-08-14 NOTE — TELEPHONE ENCOUNTER
Notes from RF  From 8/7/17 visit faxed to [facility] at Dr. [facility] office, they received fax. Pt is now scheduled for surgery on Monday 8/21/17  They will need RX for Lovenox sent to [address], [address]...

## 2017-08-14 NOTE — TELEPHONE ENCOUNTER
Patient will be having surgery on the 21st.  She needs Lovenox bridging. Here the specific instructions:  1. Stop Coumadin beginning August 16 and no further Coumadin until after the surgery  2.   Begin once a day morning Lovenox injection starting on Aug

## 2017-08-14 NOTE — TELEPHONE ENCOUNTER
Faxed signed form from [facility] and instructions per Dr. Vannessa Leach. Informed pt that instructions were faxed to AASHISH and form. Pt expressed understanding and thanks.

## 2017-08-16 ENCOUNTER — ANTI-COAG VISIT (OUTPATIENT)
Dept: FAMILY MEDICINE CLINIC | Facility: CLINIC | Age: 77
End: 2017-08-16

## 2017-08-16 DIAGNOSIS — Z79.01 LONG TERM (CURRENT) USE OF ANTICOAGULANTS: ICD-10-CM

## 2017-08-16 DIAGNOSIS — I48.20 CHRONIC ATRIAL FIBRILLATION (HCC): ICD-10-CM

## 2017-08-16 LAB — INR: 2.4 (ref 0.8–1.2)

## 2017-08-16 PROCEDURE — 99211 OFF/OP EST MAY X REQ PHY/QHP: CPT | Performed by: FAMILY MEDICINE

## 2017-08-16 PROCEDURE — 85610 PROTHROMBIN TIME: CPT | Performed by: FAMILY MEDICINE

## 2017-08-16 NOTE — PROGRESS NOTES
INR in goal range at 2.4. Will be holding coumadin starting today for spine injections on Monday. Will start lovenox bridge in 2 days. See Dr. Kristina Erwin orders in telephone order 8/14/17.    Reviewed lovenox injection technique with patient and loida

## 2017-08-16 NOTE — PATIENT INSTRUCTIONS
INR is in goal range at 2.4. Stop coumadin today for procedure. Start lovenox on Friday morning. Continue daily lovenox injection at the same time daily. NO INJECTION MORNING OF PROCEDURE. Resume coumadin after procedure as directed by Dr. Cornell Staley.

## 2017-08-21 ENCOUNTER — ANTI-COAG VISIT (OUTPATIENT)
Dept: FAMILY MEDICINE CLINIC | Facility: CLINIC | Age: 77
End: 2017-08-21

## 2017-08-21 DIAGNOSIS — I48.20 CHRONIC ATRIAL FIBRILLATION (HCC): ICD-10-CM

## 2017-08-21 LAB — INR: 1 (ref 0.8–1.2)

## 2017-08-21 PROCEDURE — 85610 PROTHROMBIN TIME: CPT | Performed by: FAMILY MEDICINE

## 2017-08-21 PROCEDURE — 99211 OFF/OP EST MAY X REQ PHY/QHP: CPT | Performed by: FAMILY MEDICINE

## 2017-08-21 NOTE — PROGRESS NOTES
INR in goal for surgery today. ( back procedure)  INR 1.0. Holding coumadin since last week. Lovenox bridge. ( holding today)   Resume coumadin as advised by Dr. Ar Lara. First day resuming take 10mg, then resume 8mg daily. INR on Friday 8/25/17.      I

## 2017-08-21 NOTE — PATIENT INSTRUCTIONS
INR is good for surgery at 1.0. Resume coumadin and lovenox as advised by Dr. Violet Livingston. When you resume coumadin start at 10mg first day, then resume 8mg daily. INR on Friday.     Watch for bleeding such as black tarry stool, bloody stool, blood in urine

## 2017-08-25 ENCOUNTER — ANTI-COAG VISIT (OUTPATIENT)
Dept: FAMILY MEDICINE CLINIC | Facility: CLINIC | Age: 77
End: 2017-08-25

## 2017-08-25 DIAGNOSIS — I48.20 CHRONIC ATRIAL FIBRILLATION (HCC): ICD-10-CM

## 2017-08-25 DIAGNOSIS — Z79.01 LONG TERM CURRENT USE OF ANTICOAGULANT: Primary | ICD-10-CM

## 2017-08-25 LAB — INR: 1.4 (ref 0.8–1.2)

## 2017-08-25 PROCEDURE — 99211 OFF/OP EST MAY X REQ PHY/QHP: CPT | Performed by: FAMILY MEDICINE

## 2017-08-25 PROCEDURE — 85610 PROTHROMBIN TIME: CPT | Performed by: FAMILY MEDICINE

## 2017-08-25 RX ORDER — WARFARIN SODIUM 1 MG/1
2 TABLET ORAL
Qty: 180 TABLET | Refills: 0 | Status: SHIPPED | OUTPATIENT
Start: 2017-08-25 | End: 2017-12-04

## 2017-08-25 NOTE — TELEPHONE ENCOUNTER
Requesting refill of coumadin 1mg tablets to [facility]    Last INR today 8/25/17  Last OV Afib Dr. Sofía Rosado on 8/7/17  Last labs 3/7/17  No upcoming appointment with provider scheduled    Order pended below

## 2017-08-25 NOTE — PATIENT INSTRUCTIONS
INR still low. Take coumadin 10mg today and tomorrow, then resume regular dose. Continue lovenox  INR on Monday.

## 2017-08-29 ENCOUNTER — ANTI-COAG VISIT (OUTPATIENT)
Dept: FAMILY MEDICINE CLINIC | Facility: CLINIC | Age: 77
End: 2017-08-29

## 2017-08-29 DIAGNOSIS — I48.20 CHRONIC ATRIAL FIBRILLATION (HCC): ICD-10-CM

## 2017-08-29 LAB — INR: 2 (ref 0.8–1.2)

## 2017-08-29 PROCEDURE — 85610 PROTHROMBIN TIME: CPT | Performed by: FAMILY MEDICINE

## 2017-08-29 PROCEDURE — 99211 OFF/OP EST MAY X REQ PHY/QHP: CPT | Performed by: FAMILY MEDICINE

## 2017-08-29 NOTE — PATIENT INSTRUCTIONS
INR is in goal range now. Take 10mg today then resume 8mg daily. STOP LOVENOX. Watch for bleeding problems such as black tarry stools, blood in urine, frequent or prolonged nose bleeds, unusual bruising, or any other unusual bleeding.  Seek medical atte

## 2017-08-29 NOTE — PROGRESS NOTES
INR therapeutic post spine injection at 2.0. Discontinue lovenox. Take one more day of 10mg coumadin and then resume 8mg daily. INR in one week.

## 2017-09-06 ENCOUNTER — ANTI-COAG VISIT (OUTPATIENT)
Dept: FAMILY MEDICINE CLINIC | Facility: CLINIC | Age: 77
End: 2017-09-06

## 2017-09-06 VITALS
TEMPERATURE: 98 F | SYSTOLIC BLOOD PRESSURE: 130 MMHG | RESPIRATION RATE: 12 BRPM | HEART RATE: 60 BPM | DIASTOLIC BLOOD PRESSURE: 68 MMHG

## 2017-09-06 DIAGNOSIS — I48.20 CHRONIC ATRIAL FIBRILLATION (HCC): ICD-10-CM

## 2017-09-06 LAB — INR: 2.5 (ref 0.8–1.2)

## 2017-09-06 PROCEDURE — 99211 OFF/OP EST MAY X REQ PHY/QHP: CPT | Performed by: FAMILY MEDICINE

## 2017-09-06 PROCEDURE — 85610 PROTHROMBIN TIME: CPT | Performed by: FAMILY MEDICINE

## 2017-09-06 NOTE — PROGRESS NOTES
INR in goal range at 2.5  CPM 8mg daily  INR in one month    AVS printed and given to patient and/or family member. Coumadin instructions explained and patient and/or family member and/or care giver verbalize understanding.

## 2017-09-25 ENCOUNTER — OFFICE VISIT (OUTPATIENT)
Dept: FAMILY MEDICINE CLINIC | Facility: CLINIC | Age: 77
End: 2017-09-25

## 2017-09-25 VITALS
BODY MASS INDEX: 43.43 KG/M2 | HEIGHT: 61 IN | WEIGHT: 230 LBS | HEART RATE: 72 BPM | DIASTOLIC BLOOD PRESSURE: 70 MMHG | TEMPERATURE: 99 F | SYSTOLIC BLOOD PRESSURE: 122 MMHG | RESPIRATION RATE: 16 BRPM

## 2017-09-25 DIAGNOSIS — N39.0 URINARY TRACT INFECTION WITH HEMATURIA, SITE UNSPECIFIED: Primary | ICD-10-CM

## 2017-09-25 DIAGNOSIS — R31.9 URINARY TRACT INFECTION WITH HEMATURIA, SITE UNSPECIFIED: Primary | ICD-10-CM

## 2017-09-25 DIAGNOSIS — R30.0 DYSURIA: ICD-10-CM

## 2017-09-25 LAB
BILIRUB UR QL STRIP.AUTO: NEGATIVE
COLOR UR AUTO: YELLOW
GLUCOSE UR STRIP.AUTO-MCNC: NEGATIVE MG/DL
KETONES UR STRIP.AUTO-MCNC: NEGATIVE MG/DL
LEUKOCYTES: 0.2
MULTISTIX LOT#: NORMAL NUMERIC
NITRITE UR QL STRIP.AUTO: NEGATIVE
PH UR STRIP.AUTO: 7 [PH] (ref 4.5–8)
PH, URINE: 7 (ref 4.5–8)
PROT UR STRIP.AUTO-MCNC: NEGATIVE MG/DL
RBC #/AREA URNS AUTO: >10 /HPF
SP GR UR STRIP.AUTO: 1.01 (ref 1–1.03)
SPECIFIC GRAVITY: 1.01 (ref 1–1.03)
UROBILINOGEN UR STRIP.AUTO-MCNC: <2 MG/DL
UROBILINOGEN,SEMI-QN: 0.2 MG/DL (ref 0–1.9)
WBC #/AREA URNS AUTO: >50 /HPF
WBC CLUMPS UR QL AUTO: PRESENT

## 2017-09-25 PROCEDURE — 81001 URINALYSIS AUTO W/SCOPE: CPT | Performed by: NURSE PRACTITIONER

## 2017-09-25 PROCEDURE — 87086 URINE CULTURE/COLONY COUNT: CPT | Performed by: NURSE PRACTITIONER

## 2017-09-25 PROCEDURE — 99213 OFFICE O/P EST LOW 20 MIN: CPT | Performed by: NURSE PRACTITIONER

## 2017-09-25 PROCEDURE — 81003 URINALYSIS AUTO W/O SCOPE: CPT | Performed by: NURSE PRACTITIONER

## 2017-09-25 RX ORDER — CEPHALEXIN 500 MG/1
500 CAPSULE ORAL 2 TIMES DAILY
Qty: 20 CAPSULE | Refills: 0 | Status: SHIPPED | OUTPATIENT
Start: 2017-09-25 | End: 2017-10-05 | Stop reason: ALTCHOICE

## 2017-09-25 NOTE — PATIENT INSTRUCTIONS
Push fluids, rest.  Antibiotic as prescribed, take with food. Take probiotic over the counter while on antibiotic. Use Monistat vaginal cream over the counter for yeast infection.   No baths, wear cotton panties, wipe front to back, urinate before and aft repeated UTIs, a low-dose antibiotic may be needed for several months. Take antibiotics exactly as directed. Don’t stop taking them until all of the medication is gone.  If you stop taking the antibiotic too soon, the infection may not go away, and you may

## 2017-09-25 NOTE — PROGRESS NOTES
CHIEF COMPLAINT:   Patient presents with:  Dysuria: Y4FGM  Urinary Frequency: X1day      HPI:   Yoli Palacios is a 69year old female who presents with symptoms of UTI. Complaining of urinary frequency, urgency, dysuria for x 1 day.  Symptoms have Smoking status: Former Smoker                                                              Packs/day: 0.00      Years: 0.00         Quit date: 3/15/1971  Smokeless tobacco: Never Used                      Alcohol use: Yes           0.0 oz/week        REVIE PLAN: Meds as listed below.   Comfort measures as described in Patient Instructions    Meds & Refills for this Visit:    Signed Prescriptions Disp Refills    cephALEXin (KEFLEX) 500 MG Oral Cap 20 capsule 0      Sig: Take 1 capsule (500 mg total) by mouth 2 · Urethritis: This is an inflamed urethra, which is the tube that carries urine from the bladder to outside the body. You may have lower stomach or back pain. You may also have urgent or frequent urination. · Pyelonephritis: This is a kidney infection.  If · Use condoms during sex. These help prevent UTIs caused by sexually transmitted bacteria. Also, avoid using spermicides during sex. These can increase the risk of UTIs. Choose other forms of birth control instead.  For women who tend to get UTIs after sex,

## 2017-09-28 ENCOUNTER — ANTI-COAG VISIT (OUTPATIENT)
Dept: FAMILY MEDICINE CLINIC | Facility: CLINIC | Age: 77
End: 2017-09-28

## 2017-09-28 ENCOUNTER — TELEPHONE (OUTPATIENT)
Dept: FAMILY MEDICINE CLINIC | Facility: CLINIC | Age: 77
End: 2017-09-28

## 2017-09-28 LAB — INR: 2.7 (ref 0.8–1.2)

## 2017-09-28 PROCEDURE — 85610 PROTHROMBIN TIME: CPT | Performed by: FAMILY MEDICINE

## 2017-09-28 PROCEDURE — 99211 OFF/OP EST MAY X REQ PHY/QHP: CPT | Performed by: FAMILY MEDICINE

## 2017-09-28 NOTE — TELEPHONE ENCOUNTER
Patient notified of results and recommendations. States she will stop antibiotic and she is feeling much better.

## 2017-09-28 NOTE — TELEPHONE ENCOUNTER
----- Message from ZAHRAA Adler sent at 9/28/2017  8:15 AM CDT -----  No growth on urine cx. Recommend stopping antibiotic, as there is no UTI noted. Please assess how pt is feeling.

## 2017-10-04 ENCOUNTER — TELEPHONE (OUTPATIENT)
Dept: FAMILY MEDICINE CLINIC | Facility: CLINIC | Age: 77
End: 2017-10-04

## 2017-10-04 NOTE — TELEPHONE ENCOUNTER
I spoke with Sunny Mas about missing her INR appointment today, she stated that she thought she cancled her appointment, but she did reschedule INR to 10/5/17.

## 2017-10-05 ENCOUNTER — ANTI-COAG VISIT (OUTPATIENT)
Dept: FAMILY MEDICINE CLINIC | Facility: CLINIC | Age: 77
End: 2017-10-05

## 2017-10-05 DIAGNOSIS — I48.91 ATRIAL FIBRILLATION, UNSPECIFIED TYPE (HCC): ICD-10-CM

## 2017-10-05 PROCEDURE — 99211 OFF/OP EST MAY X REQ PHY/QHP: CPT | Performed by: FAMILY MEDICINE

## 2017-10-05 PROCEDURE — 85610 PROTHROMBIN TIME: CPT | Performed by: FAMILY MEDICINE

## 2017-10-05 NOTE — PROGRESS NOTES
INR elevated at 3.7. Diet change- did not eat any vitamin K rich foods this week. Decrease coumadin today to 4mg. Will eat cabbage and broccoli today. Resume regular coumadin dose tomorrow. 8mg daily. Return for next INR in one week.      AVS printed

## 2017-10-05 NOTE — PATIENT INSTRUCTIONS
INR elevated at 3.7  Take coumadin dose today at 4mg. Then tomorrow start 8mg daily again. Eat cabbage and broccoli today as discussed. Return for INR in one week.

## 2017-10-09 NOTE — TELEPHONE ENCOUNTER
Future appt:     Your appointments     Date & Time Appointment Department [facility])    Oct 12, 2017 10:30 AM CDT Anti-Coag with [facility] [address] [facility]        [address] [place] [address]

## 2017-10-09 NOTE — TELEPHONE ENCOUNTER
Refill will not be given without appt being scheduled. Pt has not had physical or medical check with me in 2017.

## 2017-10-10 RX ORDER — WARFARIN SODIUM 6 MG/1
TABLET ORAL
Qty: 90 TABLET | Refills: 0 | Status: SHIPPED | OUTPATIENT
Start: 2017-10-10 | End: 2018-01-16

## 2017-10-10 NOTE — TELEPHONE ENCOUNTER
Rafael Lawler Nurse [place]   Caller: patient (Today,  9:09 AM)             Returned your call and is now set for a physical on 12-20, ok to send out her refills to [facility].  Thank you        Future Appointments  Date Time Provider ANH

## 2017-10-12 ENCOUNTER — ANTI-COAG VISIT (OUTPATIENT)
Dept: FAMILY MEDICINE CLINIC | Facility: CLINIC | Age: 77
End: 2017-10-12

## 2017-10-12 DIAGNOSIS — I48.91 ATRIAL FIBRILLATION, UNSPECIFIED TYPE (HCC): ICD-10-CM

## 2017-10-12 PROCEDURE — 99211 OFF/OP EST MAY X REQ PHY/QHP: CPT | Performed by: FAMILY MEDICINE

## 2017-10-12 PROCEDURE — 85610 PROTHROMBIN TIME: CPT | Performed by: FAMILY MEDICINE

## 2017-10-12 NOTE — PROGRESS NOTES
INR still elevated at 3.6  HOLD coumadin today, then resume 8mg daily. Next INR in one week. AVS printed and given to patient and/or family member.    Coumadin instructions explained and patient and/or family member and/or care giver verbalize underst

## 2017-10-12 NOTE — PATIENT INSTRUCTIONS
INR still elevated at 3.6  HOLD coumadin today, then resume 8mg today. INR next week. Watch for bleeding such as black tarry stool, bloody stool, blood in urine, unusual bruising or significant nose bleeds. Please call if these symptoms occur.    If

## 2017-10-26 ENCOUNTER — ANTI-COAG VISIT (OUTPATIENT)
Dept: FAMILY MEDICINE CLINIC | Facility: CLINIC | Age: 77
End: 2017-10-26

## 2017-10-26 VITALS
HEART RATE: 80 BPM | RESPIRATION RATE: 18 BRPM | SYSTOLIC BLOOD PRESSURE: 130 MMHG | DIASTOLIC BLOOD PRESSURE: 82 MMHG | TEMPERATURE: 98 F

## 2017-10-26 DIAGNOSIS — I48.91 ATRIAL FIBRILLATION, UNSPECIFIED TYPE (HCC): ICD-10-CM

## 2017-10-26 PROCEDURE — 85610 PROTHROMBIN TIME: CPT | Performed by: FAMILY MEDICINE

## 2017-10-26 PROCEDURE — 99211 OFF/OP EST MAY X REQ PHY/QHP: CPT | Performed by: FAMILY MEDICINE

## 2017-10-26 NOTE — PROGRESS NOTES
INR is still elevated at 3.2  Decrease coumadin to 6mg Th and 8mg daily the rest of the week. Next INR in 2 weeks. AVS printed and given to patient and/or family member.    Coumadin instructions explained and patient and/or family member and/or care g

## 2017-11-09 ENCOUNTER — ANTI-COAG VISIT (OUTPATIENT)
Dept: FAMILY MEDICINE CLINIC | Facility: CLINIC | Age: 77
End: 2017-11-09

## 2017-11-09 DIAGNOSIS — I48.91 ATRIAL FIBRILLATION, UNSPECIFIED TYPE (HCC): ICD-10-CM

## 2017-11-09 PROCEDURE — 85610 PROTHROMBIN TIME: CPT | Performed by: FAMILY MEDICINE

## 2017-11-09 PROCEDURE — 99211 OFF/OP EST MAY X REQ PHY/QHP: CPT | Performed by: FAMILY MEDICINE

## 2017-11-09 NOTE — PROGRESS NOTES
INR in goal at 2.6  Started amoxil yesterday for tooth infection. CPM coumadin 6mg Th and 8mg daily the rest of the week  INR in one week. AVS printed and given to patient and/or family member.    Coumadin instructions explained and patient and/or fam

## 2017-11-16 ENCOUNTER — ANTI-COAG VISIT (OUTPATIENT)
Dept: FAMILY MEDICINE CLINIC | Facility: CLINIC | Age: 77
End: 2017-11-16

## 2017-11-16 DIAGNOSIS — I48.91 ATRIAL FIBRILLATION, UNSPECIFIED TYPE (HCC): ICD-10-CM

## 2017-11-16 DIAGNOSIS — Z79.01 LONG TERM (CURRENT) USE OF ANTICOAGULANTS: ICD-10-CM

## 2017-11-16 PROCEDURE — 99211 OFF/OP EST MAY X REQ PHY/QHP: CPT | Performed by: FAMILY MEDICINE

## 2017-11-16 PROCEDURE — 85610 PROTHROMBIN TIME: CPT | Performed by: FAMILY MEDICINE

## 2017-11-16 NOTE — PROGRESS NOTES
INR in goal range at 2.5  CPM coumadin 6mg Th and 8mg daily the rest of the week  Next INR in 2 weeks. AVS printed and given to patient and/or family member.    Coumadin instructions explained and patient and/or family member and/or care giver verbalize

## 2017-11-30 ENCOUNTER — TELEPHONE (OUTPATIENT)
Dept: FAMILY MEDICINE CLINIC | Facility: CLINIC | Age: 77
End: 2017-11-30

## 2017-12-01 ENCOUNTER — TELEPHONE (OUTPATIENT)
Dept: FAMILY MEDICINE CLINIC | Facility: CLINIC | Age: 77
End: 2017-12-01

## 2017-12-01 ENCOUNTER — ANTI-COAG VISIT (OUTPATIENT)
Dept: FAMILY MEDICINE CLINIC | Facility: CLINIC | Age: 77
End: 2017-12-01

## 2017-12-01 DIAGNOSIS — Z79.01 LONG TERM (CURRENT) USE OF ANTICOAGULANTS: ICD-10-CM

## 2017-12-01 DIAGNOSIS — I48.91 ATRIAL FIBRILLATION, UNSPECIFIED TYPE (HCC): ICD-10-CM

## 2017-12-01 PROCEDURE — 85610 PROTHROMBIN TIME: CPT | Performed by: FAMILY MEDICINE

## 2017-12-01 PROCEDURE — 99211 OFF/OP EST MAY X REQ PHY/QHP: CPT | Performed by: FAMILY MEDICINE

## 2017-12-01 NOTE — PROGRESS NOTES
INR in goal at 2.7  CPM coumadin 6mg Th and 8mg daily the rest of the week   Dental extraction scheduled for 12/12 by Dr. Elvin Arechiga  Will need to hold coumadin x 3 days preop  Dr. Lidia Prakash notified.  See telephone encounter 12/1/17  Next INR the day before

## 2017-12-01 NOTE — TELEPHONE ENCOUNTER
Dental extraction planned for 12/12/17 by Dr. Roseline Perrin. Dr. Juan Manuel Hatch requests patient to hold coumadin for 3 days preop  Patient on coumadin therapy for atrial fibrillation  Preop INR scheduled day before procedure on 12/11/17.

## 2017-12-04 DIAGNOSIS — Z79.01 LONG TERM CURRENT USE OF ANTICOAGULANT: ICD-10-CM

## 2017-12-04 NOTE — TELEPHONE ENCOUNTER
Future appt:     Your appointments     Date & Time Appointment Department [facility])    Dec 11, 2017  2:30 PM CST Anti-Coag with [facility] [address], [place] ([place])    Dec 20, 2017 10:00 AM CST Medic

## 2017-12-05 RX ORDER — WARFARIN SODIUM 1 MG/1
TABLET ORAL
Qty: 180 TABLET | Refills: 0 | Status: SHIPPED | OUTPATIENT
Start: 2017-12-05 | End: 2018-02-20 | Stop reason: DRUGHIGH

## 2017-12-11 ENCOUNTER — ANTI-COAG VISIT (OUTPATIENT)
Dept: FAMILY MEDICINE CLINIC | Facility: CLINIC | Age: 77
End: 2017-12-11

## 2017-12-11 DIAGNOSIS — Z79.01 LONG TERM (CURRENT) USE OF ANTICOAGULANTS: ICD-10-CM

## 2017-12-11 DIAGNOSIS — I48.91 ATRIAL FIBRILLATION, UNSPECIFIED TYPE (HCC): ICD-10-CM

## 2017-12-11 PROCEDURE — 85610 PROTHROMBIN TIME: CPT | Performed by: FAMILY MEDICINE

## 2017-12-11 NOTE — PROGRESS NOTES
INR down at 1.8  Holding past 3 days for tooth extraction and eye stye removal tomorrow. Resume coumadin tomorrow evening if okay with surgeons. Take coumadin 8mg daily   Next INR in one week after resuming coumadin.    AVS printed and given to patient

## 2017-12-11 NOTE — PATIENT INSTRUCTIONS
INR down for procedures. Hold coumadin again today. Resume coumadin 8mg daily tomorrow evening after procedures, if okay with dentist and eye doctor.        Watch for bleeding such as black tarry stool, bloody stool, blood in urine, unusual bruising or

## 2017-12-20 ENCOUNTER — OFFICE VISIT (OUTPATIENT)
Dept: FAMILY MEDICINE CLINIC | Facility: CLINIC | Age: 77
End: 2017-12-20

## 2017-12-20 ENCOUNTER — ANTI-COAG VISIT (OUTPATIENT)
Dept: FAMILY MEDICINE CLINIC | Facility: CLINIC | Age: 77
End: 2017-12-20

## 2017-12-20 VITALS
HEART RATE: 76 BPM | TEMPERATURE: 99 F | SYSTOLIC BLOOD PRESSURE: 128 MMHG | RESPIRATION RATE: 20 BRPM | BODY MASS INDEX: 44.18 KG/M2 | HEIGHT: 60.5 IN | WEIGHT: 231 LBS | DIASTOLIC BLOOD PRESSURE: 68 MMHG

## 2017-12-20 DIAGNOSIS — M85.88 OSTEOPENIA OF SPINE: ICD-10-CM

## 2017-12-20 DIAGNOSIS — Z79.01 LONG TERM (CURRENT) USE OF ANTICOAGULANTS: ICD-10-CM

## 2017-12-20 DIAGNOSIS — I48.91 ATRIAL FIBRILLATION, UNSPECIFIED TYPE (HCC): ICD-10-CM

## 2017-12-20 DIAGNOSIS — Z13.31 DEPRESSION SCREENING: ICD-10-CM

## 2017-12-20 DIAGNOSIS — E78.49 FAMILIAL MULTIPLE LIPOPROTEIN-TYPE HYPERLIPIDEMIA: ICD-10-CM

## 2017-12-20 DIAGNOSIS — Z00.00 ANNUAL PHYSICAL EXAM: Primary | ICD-10-CM

## 2017-12-20 DIAGNOSIS — R32 URINARY INCONTINENCE, UNSPECIFIED TYPE: ICD-10-CM

## 2017-12-20 DIAGNOSIS — I10 ESSENTIAL HYPERTENSION: ICD-10-CM

## 2017-12-20 DIAGNOSIS — Z79.01 LONG TERM CURRENT USE OF ANTICOAGULANT THERAPY: ICD-10-CM

## 2017-12-20 DIAGNOSIS — Z00.00 ENCOUNTER FOR ANNUAL HEALTH EXAMINATION: ICD-10-CM

## 2017-12-20 DIAGNOSIS — K21.9 GASTROESOPHAGEAL REFLUX DISEASE WITHOUT ESOPHAGITIS: ICD-10-CM

## 2017-12-20 PROCEDURE — G0444 DEPRESSION SCREEN ANNUAL: HCPCS | Performed by: FAMILY MEDICINE

## 2017-12-20 PROCEDURE — 85610 PROTHROMBIN TIME: CPT | Performed by: FAMILY MEDICINE

## 2017-12-20 PROCEDURE — G0439 PPPS, SUBSEQ VISIT: HCPCS | Performed by: FAMILY MEDICINE

## 2017-12-20 NOTE — PATIENT INSTRUCTIONS
rec diet and activity to lower weight    rec fasting labs    rec mammogram and colonoscopy    Referral Dr. Trish Mercado [facility] - pt to talk to eye dr re referral      Axel Garner   Tests on this list are recommended Colonoscopy Screen   Covered every 10 years- more often if abnormal There are no preventive care reminders to display for this patient.  Update Health Maintenance if applicable    Flex Sigmoidoscopy Screen  Covered every 5 years No results found for this or found for this or any previous visit. Please get once after your 65th birthday    Pneumococcal 23 (Pneumovax)  Covered Once after 65 No orders found for this or any previous visit.  Please get once after your 65th birthday    Hepatitis B for Moderate/High R

## 2017-12-20 NOTE — PROGRESS NOTES
INR checked in office in coumadin clinic    Patient also saw Dr. Jose Ramon Garza today for office visit. INR in goal range at 2.7  Patient resumed coumadin one week ago after holding for dental and eyelid surgery.    Patient ended up taking coumadin 6mg on Sund

## 2017-12-20 NOTE — PROGRESS NOTES
CC: Annual Physical Exam    HPI:   Injennifer Call is a 69year old female who presents for a complete physical exam.      back issues-- injections- Dr. Blanca Rodriguez for pain/ arthritis- has spinal stenosis    Pt planning colonoscopy and mammogram in [place] • Heart Disorder Brother    • Stroke Brother       Social History:   Social History    Marital status:              Spouse name:                       Years of education:                 Number of children:               Occupational History  Occu asthma, sneezing, hives, eczema or rhinitis.     EXAM:   /68   Pulse 76   Temp 98.5 °F (36.9 °C) (Tympanic)   Resp 20   Ht 60.5\"   Wt 231 lb   BMI 44.37 kg/m²  Estimated body mass index is 44.37 kg/m² as calculated from the following:    Height as of of spine    - VITAMIN D, 25-HYDROXY; Future    4. Atrial fibrillation, unspecified type ([facility]      5. Essential hypertension      6. Familial multiple lipoprotein-type hyperlipidemia      7. Gastroesophageal reflux disease without esophagitis      8.  Long

## 2018-01-03 ENCOUNTER — LABORATORY ENCOUNTER (OUTPATIENT)
Dept: LAB | Age: 78
End: 2018-01-03
Attending: FAMILY MEDICINE
Payer: MEDICARE

## 2018-01-03 ENCOUNTER — ANTI-COAG VISIT (OUTPATIENT)
Dept: FAMILY MEDICINE CLINIC | Facility: CLINIC | Age: 78
End: 2018-01-03

## 2018-01-03 DIAGNOSIS — Z79.01 LONG TERM (CURRENT) USE OF ANTICOAGULANTS: ICD-10-CM

## 2018-01-03 DIAGNOSIS — I48.91 ATRIAL FIBRILLATION, UNSPECIFIED TYPE (HCC): ICD-10-CM

## 2018-01-03 DIAGNOSIS — Z00.00 ANNUAL PHYSICAL EXAM: ICD-10-CM

## 2018-01-03 DIAGNOSIS — M85.88 OSTEOPENIA OF SPINE: ICD-10-CM

## 2018-01-03 LAB
25-HYDROXYVITAMIN D (TOTAL): 21.4 NG/ML (ref 30–100)
ALBUMIN SERPL-MCNC: 3.5 G/DL (ref 3.5–4.8)
ALP LIVER SERPL-CCNC: 63 U/L (ref 55–142)
ALT SERPL-CCNC: 20 U/L (ref 14–54)
AST SERPL-CCNC: 16 U/L (ref 15–41)
BASOPHILS # BLD AUTO: 0.06 X10(3) UL (ref 0–0.1)
BASOPHILS NFR BLD AUTO: 0.7 %
BILIRUB SERPL-MCNC: 0.5 MG/DL (ref 0.1–2)
BILIRUB UR QL STRIP.AUTO: NEGATIVE
BUN BLD-MCNC: 23 MG/DL (ref 8–20)
CALCIUM BLD-MCNC: 9 MG/DL (ref 8.3–10.3)
CHLORIDE: 107 MMOL/L (ref 101–111)
CHOLEST SMN-MCNC: 158 MG/DL (ref ?–200)
CO2: 27 MMOL/L (ref 22–32)
COLOR UR AUTO: YELLOW
CREAT BLD-MCNC: 0.67 MG/DL (ref 0.55–1.02)
EOSINOPHIL # BLD AUTO: 0.36 X10(3) UL (ref 0–0.3)
EOSINOPHIL NFR BLD AUTO: 4 %
ERYTHROCYTE [DISTWIDTH] IN BLOOD BY AUTOMATED COUNT: 12.2 % (ref 11.5–16)
GLUCOSE BLD-MCNC: 99 MG/DL (ref 70–99)
GLUCOSE UR STRIP.AUTO-MCNC: NEGATIVE MG/DL
HCT VFR BLD AUTO: 45.1 % (ref 34–50)
HDLC SERPL-MCNC: 40 MG/DL (ref 45–?)
HDLC SERPL: 3.95 {RATIO} (ref ?–4.44)
HGB BLD-MCNC: 14.6 G/DL (ref 12–16)
IMMATURE GRANULOCYTE COUNT: 0.02 X10(3) UL (ref 0–1)
IMMATURE GRANULOCYTE RATIO %: 0.2 %
INR: 3.6 (ref 0.8–1.2)
KETONES UR STRIP.AUTO-MCNC: NEGATIVE MG/DL
LDLC SERPL CALC-MCNC: 94 MG/DL (ref ?–130)
LYMPHOCYTES # BLD AUTO: 1.73 X10(3) UL (ref 0.9–4)
LYMPHOCYTES NFR BLD AUTO: 19.1 %
M PROTEIN MFR SERPL ELPH: 7.8 G/DL (ref 6.1–8.3)
MCH RBC QN AUTO: 29.1 PG (ref 27–33.2)
MCHC RBC AUTO-ENTMCNC: 32.4 G/DL (ref 31–37)
MCV RBC AUTO: 90 FL (ref 81–100)
MONOCYTES # BLD AUTO: 0.58 X10(3) UL (ref 0.1–0.6)
MONOCYTES NFR BLD AUTO: 6.4 %
NEUTROPHIL ABS PRELIM: 6.31 X10 (3) UL (ref 1.3–6.7)
NEUTROPHILS # BLD AUTO: 6.31 X10(3) UL (ref 1.3–6.7)
NEUTROPHILS NFR BLD AUTO: 69.6 %
NITRITE UR QL STRIP.AUTO: NEGATIVE
NONHDLC SERPL-MCNC: 118 MG/DL (ref ?–130)
PH UR STRIP.AUTO: 6 [PH] (ref 4.5–8)
PLATELET # BLD AUTO: 332 10(3)UL (ref 150–450)
POTASSIUM SERPL-SCNC: 4 MMOL/L (ref 3.6–5.1)
PROT UR STRIP.AUTO-MCNC: NEGATIVE MG/DL
RBC # BLD AUTO: 5.01 X10(6)UL (ref 3.8–5.1)
RED CELL DISTRIBUTION WIDTH-SD: 39.9 FL (ref 35.1–46.3)
SODIUM SERPL-SCNC: 139 MMOL/L (ref 136–144)
SP GR UR STRIP.AUTO: 1.02 (ref 1–1.03)
TRIGL SERPL-MCNC: 122 MG/DL (ref ?–150)
TSI SER-ACNC: 2.79 MIU/ML (ref 0.35–5.5)
UROBILINOGEN UR STRIP.AUTO-MCNC: <2 MG/DL
VLDLC SERPL CALC-MCNC: 24 MG/DL (ref 5–40)
WBC # BLD AUTO: 9.1 X10(3) UL (ref 4–13)
WBC #/AREA URNS AUTO: >50 /HPF

## 2018-01-03 PROCEDURE — 80061 LIPID PANEL: CPT

## 2018-01-03 PROCEDURE — 87147 CULTURE TYPE IMMUNOLOGIC: CPT

## 2018-01-03 PROCEDURE — 87086 URINE CULTURE/COLONY COUNT: CPT

## 2018-01-03 PROCEDURE — 82306 VITAMIN D 25 HYDROXY: CPT

## 2018-01-03 PROCEDURE — 99211 OFF/OP EST MAY X REQ PHY/QHP: CPT | Performed by: FAMILY MEDICINE

## 2018-01-03 PROCEDURE — 85610 PROTHROMBIN TIME: CPT | Performed by: FAMILY MEDICINE

## 2018-01-03 PROCEDURE — 84443 ASSAY THYROID STIM HORMONE: CPT

## 2018-01-03 PROCEDURE — 87186 SC STD MICRODIL/AGAR DIL: CPT

## 2018-01-03 PROCEDURE — 36415 COLL VENOUS BLD VENIPUNCTURE: CPT

## 2018-01-03 PROCEDURE — 81001 URINALYSIS AUTO W/SCOPE: CPT

## 2018-01-03 PROCEDURE — 80053 COMPREHEN METABOLIC PANEL: CPT

## 2018-01-03 PROCEDURE — 85025 COMPLETE CBC W/AUTO DIFF WBC: CPT

## 2018-01-03 NOTE — PROGRESS NOTES
INR checked in office in coumadin clinic  INR elevated at 3.6  Patient has increased stress with move over last couple days and had one beer over the weekend.    Will be leaving on trip to [place] in a couple days for one week  Hold coumadin today, then res

## 2018-01-03 NOTE — PATIENT INSTRUCTIONS
INR elevated.    HOLD coumadin today  Then resume regular coumadin dose  INR when you return from trip

## 2018-01-08 ENCOUNTER — TELEPHONE (OUTPATIENT)
Dept: FAMILY MEDICINE CLINIC | Facility: CLINIC | Age: 78
End: 2018-01-08

## 2018-01-08 NOTE — TELEPHONE ENCOUNTER
----- Message from ZAHRAA Michel sent at 1/8/2018  8:25 AM CST -----  Dr. Odilia Burton's patient– please notify patient that her urine came back with a small amount of bacteria–it is technically negative.   Also the urinalysis was not a clean catch

## 2018-01-09 RX ORDER — MULTIVIT-MIN/IRON/FOLIC ACID/K 18-600-40
2000 CAPSULE ORAL DAILY
COMMUNITY
End: 2020-09-22

## 2018-01-09 NOTE — TELEPHONE ENCOUNTER
Pt called back this AM, returned call,  Results and recommendations reviewed with pt. Pt states she does not go to Urology. Pt states she is up at night with urination  (1-2 times per night)- but states s/s are not new- ongoing last 1-2 years.   Pt last s

## 2018-01-15 ENCOUNTER — ANTI-COAG VISIT (OUTPATIENT)
Dept: FAMILY MEDICINE CLINIC | Facility: CLINIC | Age: 78
End: 2018-01-15

## 2018-01-15 DIAGNOSIS — Z79.01 LONG TERM (CURRENT) USE OF ANTICOAGULANTS: ICD-10-CM

## 2018-01-15 DIAGNOSIS — I48.91 ATRIAL FIBRILLATION, UNSPECIFIED TYPE (HCC): ICD-10-CM

## 2018-01-15 LAB — INR: 3.1 (ref 0.8–1.2)

## 2018-01-15 PROCEDURE — 93793 ANTICOAG MGMT PT WARFARIN: CPT | Performed by: FAMILY MEDICINE

## 2018-01-15 PROCEDURE — 85610 PROTHROMBIN TIME: CPT | Performed by: FAMILY MEDICINE

## 2018-01-15 NOTE — PATIENT INSTRUCTIONS
INR still a little elevated at 3.1  Take coumadin 6mg Mon and 8mg daily the rest of the week   Next INR due in one month. Watch for bleeding such as black tarry stool, bloody stool, blood in urine, unusual bruising or significant nose bleeds.  Please ca

## 2018-01-15 NOTE — PROGRESS NOTES
INR checked in office in coumadin clinic  INR slightly elevated at 3.1  Traveled to [place] this past week and had diet changes. Also took incorrect dose. Took more than prescribed.    Decrease coumadin dose to 6mg Monday and 8mg daily the rest of the wee

## 2018-01-16 RX ORDER — WARFARIN SODIUM 6 MG/1
TABLET ORAL
Qty: 90 TABLET | Refills: 0 | Status: SHIPPED | OUTPATIENT
Start: 2018-01-16 | End: 2018-02-20 | Stop reason: DRUGHIGH

## 2018-01-16 NOTE — TELEPHONE ENCOUNTER
Future appt:     Your appointments     Date & Time Appointment Department [facility])    Jan 22, 2018 10:30 AM CST Anti-Coag with [facility] [address], [facility]        [address] [place] [address]

## 2018-01-18 RX ORDER — CLOTRIMAZOLE AND BETAMETHASONE DIPROPIONATE 10; .64 MG/G; MG/G
CREAM TOPICAL
Qty: 30 G | Refills: 0 | Status: SHIPPED | OUTPATIENT
Start: 2018-01-18 | End: 2018-02-20 | Stop reason: ALTCHOICE

## 2018-01-18 NOTE — TELEPHONE ENCOUNTER
Future appt:     Your appointments     Date & Time Appointment Department [facility])    Jan 22, 2018 10:30 AM CST Anti-Coag with [facility] [address] [place] [address],[address], [place] ([facility])        [address] [place] [address]

## 2018-01-23 ENCOUNTER — ANTI-COAG VISIT (OUTPATIENT)
Dept: FAMILY MEDICINE CLINIC | Facility: CLINIC | Age: 78
End: 2018-01-23

## 2018-01-23 DIAGNOSIS — Z79.01 LONG TERM (CURRENT) USE OF ANTICOAGULANTS: ICD-10-CM

## 2018-01-23 DIAGNOSIS — I48.91 ATRIAL FIBRILLATION, UNSPECIFIED TYPE (HCC): ICD-10-CM

## 2018-01-23 LAB — INR: 1.7 (ref 0.8–1.2)

## 2018-01-23 PROCEDURE — 85610 PROTHROMBIN TIME: CPT | Performed by: FAMILY MEDICINE

## 2018-01-23 PROCEDURE — 93793 ANTICOAG MGMT PT WARFARIN: CPT | Performed by: FAMILY MEDICINE

## 2018-01-23 NOTE — PROGRESS NOTES
INR checked in office in coumadin clinic  INR low at 1.7  May have missed dosage. Discussed weekly pill boxes to improve compliance.    Today increase coumadin to 10mg today, then resume regular coumadin dose of 6mg M and 8mg daily the rest of the week

## 2018-01-30 ENCOUNTER — ANTI-COAG VISIT (OUTPATIENT)
Dept: FAMILY MEDICINE CLINIC | Facility: CLINIC | Age: 78
End: 2018-01-30

## 2018-01-30 DIAGNOSIS — Z79.01 LONG TERM (CURRENT) USE OF ANTICOAGULANTS: ICD-10-CM

## 2018-01-30 DIAGNOSIS — I48.91 ATRIAL FIBRILLATION, UNSPECIFIED TYPE (HCC): ICD-10-CM

## 2018-01-30 LAB — INR: 2.6 (ref 0.8–1.2)

## 2018-01-30 PROCEDURE — 93793 ANTICOAG MGMT PT WARFARIN: CPT | Performed by: FAMILY MEDICINE

## 2018-01-30 PROCEDURE — 85610 PROTHROMBIN TIME: CPT | Performed by: FAMILY MEDICINE

## 2018-02-13 ENCOUNTER — TELEPHONE (OUTPATIENT)
Dept: FAMILY MEDICINE CLINIC | Facility: CLINIC | Age: 78
End: 2018-02-13

## 2018-02-13 ENCOUNTER — ANTI-COAG VISIT (OUTPATIENT)
Dept: FAMILY MEDICINE CLINIC | Facility: CLINIC | Age: 78
End: 2018-02-13

## 2018-02-13 DIAGNOSIS — I48.91 ATRIAL FIBRILLATION, UNSPECIFIED TYPE (HCC): ICD-10-CM

## 2018-02-13 DIAGNOSIS — Z79.01 LONG TERM (CURRENT) USE OF ANTICOAGULANTS: ICD-10-CM

## 2018-02-13 LAB — INR: 2.3 (ref 0.8–1.2)

## 2018-02-13 PROCEDURE — 93793 ANTICOAG MGMT PT WARFARIN: CPT | Performed by: FAMILY MEDICINE

## 2018-02-13 PROCEDURE — 85610 PROTHROMBIN TIME: CPT | Performed by: FAMILY MEDICINE

## 2018-02-13 NOTE — PROGRESS NOTES
Here for INR check in coumadin clinic in the office. COMPLAINTS/CHANGES:  Upcoming colonoscopy by Dr. Magallon Has at [facility]. Patient will call with exact date. Upcoming derm/plastic surgery procedure on 3/7/18. Has Preop next week with Dr. Yoon Wise.  Adrienne

## 2018-02-13 NOTE — TELEPHONE ENCOUNTER
Patient has appointment already scheduled for preop eval.  Please have patient forwarded any updated information from her cardiologist.

## 2018-02-13 NOTE — TELEPHONE ENCOUNTER
FYI:   Colonoscopy by Dr. Janeth Hernadez at [facility] is scheduled for 2/27/18. Also scheduled for excision of skin cancer on lower left eyelid with eyelid repair. To follow with plastic surgery repair on eyes. Derm procedure is scheduled for 3/7/18.    Patient is gil

## 2018-02-20 ENCOUNTER — OFFICE VISIT (OUTPATIENT)
Dept: FAMILY MEDICINE CLINIC | Facility: CLINIC | Age: 78
End: 2018-02-20

## 2018-02-20 ENCOUNTER — ANTI-COAG VISIT (OUTPATIENT)
Dept: FAMILY MEDICINE CLINIC | Facility: CLINIC | Age: 78
End: 2018-02-20

## 2018-02-20 ENCOUNTER — APPOINTMENT (OUTPATIENT)
Dept: LAB | Age: 78
End: 2018-02-20
Attending: FAMILY MEDICINE
Payer: MEDICARE

## 2018-02-20 VITALS
HEART RATE: 82 BPM | TEMPERATURE: 98 F | OXYGEN SATURATION: 94 % | DIASTOLIC BLOOD PRESSURE: 60 MMHG | WEIGHT: 227.56 LBS | SYSTOLIC BLOOD PRESSURE: 102 MMHG | RESPIRATION RATE: 14 BRPM | BODY MASS INDEX: 42.96 KG/M2 | HEIGHT: 61 IN

## 2018-02-20 DIAGNOSIS — Z79.01 LONG TERM (CURRENT) USE OF ANTICOAGULANTS: ICD-10-CM

## 2018-02-20 DIAGNOSIS — C44.111: ICD-10-CM

## 2018-02-20 DIAGNOSIS — E78.49 FAMILIAL MULTIPLE LIPOPROTEIN-TYPE HYPERLIPIDEMIA: ICD-10-CM

## 2018-02-20 DIAGNOSIS — Z01.818 PREOP EXAMINATION: Primary | ICD-10-CM

## 2018-02-20 DIAGNOSIS — I48.91 ATRIAL FIBRILLATION, UNSPECIFIED TYPE (HCC): ICD-10-CM

## 2018-02-20 DIAGNOSIS — I10 ESSENTIAL HYPERTENSION: ICD-10-CM

## 2018-02-20 DIAGNOSIS — G47.33 OBSTRUCTIVE SLEEP APNEA: ICD-10-CM

## 2018-02-20 DIAGNOSIS — K21.9 GASTROESOPHAGEAL REFLUX DISEASE WITHOUT ESOPHAGITIS: ICD-10-CM

## 2018-02-20 DIAGNOSIS — I48.20 CHRONIC ATRIAL FIBRILLATION (HCC): ICD-10-CM

## 2018-02-20 PROBLEM — Z98.890 S/P BUNIONECTOMY: Status: RESOLVED | Noted: 2017-02-15 | Resolved: 2018-02-20

## 2018-02-20 LAB
ALBUMIN SERPL-MCNC: 3.5 G/DL (ref 3.5–4.8)
ALP LIVER SERPL-CCNC: 65 U/L (ref 55–142)
ALT SERPL-CCNC: 19 U/L (ref 14–54)
AST SERPL-CCNC: 14 U/L (ref 15–41)
BASOPHILS # BLD AUTO: 0.07 X10(3) UL (ref 0–0.1)
BASOPHILS NFR BLD AUTO: 1 %
BILIRUB SERPL-MCNC: 0.5 MG/DL (ref 0.1–2)
BILIRUB UR QL STRIP.AUTO: NEGATIVE
BUN BLD-MCNC: 23 MG/DL (ref 8–20)
CALCIUM BLD-MCNC: 9.1 MG/DL (ref 8.3–10.3)
CHLORIDE: 105 MMOL/L (ref 101–111)
CLARITY UR REFRACT.AUTO: CLEAR
CO2: 28 MMOL/L (ref 22–32)
COLOR UR AUTO: YELLOW
CREAT BLD-MCNC: 0.69 MG/DL (ref 0.55–1.02)
EOSINOPHIL # BLD AUTO: 0.25 X10(3) UL (ref 0–0.3)
EOSINOPHIL NFR BLD AUTO: 3.5 %
ERYTHROCYTE [DISTWIDTH] IN BLOOD BY AUTOMATED COUNT: 12.2 % (ref 11.5–16)
GLUCOSE BLD-MCNC: 97 MG/DL (ref 70–99)
GLUCOSE UR STRIP.AUTO-MCNC: NEGATIVE MG/DL
HCT VFR BLD AUTO: 42.4 % (ref 34–50)
HGB BLD-MCNC: 14 G/DL (ref 12–16)
HYALINE CASTS #/AREA URNS AUTO: PRESENT /LPF
IMMATURE GRANULOCYTE COUNT: 0.01 X10(3) UL (ref 0–1)
IMMATURE GRANULOCYTE RATIO %: 0.1 %
INR: 2.4 (ref 0.8–1.2)
KETONES UR STRIP.AUTO-MCNC: NEGATIVE MG/DL
LYMPHOCYTES # BLD AUTO: 1.59 X10(3) UL (ref 0.9–4)
LYMPHOCYTES NFR BLD AUTO: 22.3 %
M PROTEIN MFR SERPL ELPH: 7.7 G/DL (ref 6.1–8.3)
MCH RBC QN AUTO: 29.5 PG (ref 27–33.2)
MCHC RBC AUTO-ENTMCNC: 33 G/DL (ref 31–37)
MCV RBC AUTO: 89.3 FL (ref 81–100)
MONOCYTES # BLD AUTO: 0.61 X10(3) UL (ref 0.1–1)
MONOCYTES NFR BLD AUTO: 8.6 %
NEUTROPHIL ABS PRELIM: 4.6 X10 (3) UL (ref 1.3–6.7)
NEUTROPHILS # BLD AUTO: 4.6 X10(3) UL (ref 1.3–6.7)
NEUTROPHILS NFR BLD AUTO: 64.5 %
NITRITE UR QL STRIP.AUTO: NEGATIVE
PH UR STRIP.AUTO: 6 [PH] (ref 4.5–8)
PLATELET # BLD AUTO: 274 10(3)UL (ref 150–450)
POTASSIUM SERPL-SCNC: 4.5 MMOL/L (ref 3.6–5.1)
PROT UR STRIP.AUTO-MCNC: NEGATIVE MG/DL
RBC # BLD AUTO: 4.75 X10(6)UL (ref 3.8–5.1)
RBC UR QL AUTO: NEGATIVE
RED CELL DISTRIBUTION WIDTH-SD: 40.4 FL (ref 35.1–46.3)
SODIUM SERPL-SCNC: 138 MMOL/L (ref 136–144)
SP GR UR STRIP.AUTO: 1.02 (ref 1–1.03)
UROBILINOGEN UR STRIP.AUTO-MCNC: <2 MG/DL
WBC # BLD AUTO: 7.1 X10(3) UL (ref 4–13)

## 2018-02-20 PROCEDURE — 85025 COMPLETE CBC W/AUTO DIFF WBC: CPT | Performed by: FAMILY MEDICINE

## 2018-02-20 PROCEDURE — 36415 COLL VENOUS BLD VENIPUNCTURE: CPT | Performed by: FAMILY MEDICINE

## 2018-02-20 PROCEDURE — 85610 PROTHROMBIN TIME: CPT | Performed by: FAMILY MEDICINE

## 2018-02-20 PROCEDURE — 99215 OFFICE O/P EST HI 40 MIN: CPT | Performed by: FAMILY MEDICINE

## 2018-02-20 PROCEDURE — 93000 ELECTROCARDIOGRAM COMPLETE: CPT | Performed by: FAMILY MEDICINE

## 2018-02-20 PROCEDURE — 87086 URINE CULTURE/COLONY COUNT: CPT | Performed by: FAMILY MEDICINE

## 2018-02-20 PROCEDURE — 80053 COMPREHEN METABOLIC PANEL: CPT | Performed by: FAMILY MEDICINE

## 2018-02-20 PROCEDURE — 81001 URINALYSIS AUTO W/SCOPE: CPT | Performed by: FAMILY MEDICINE

## 2018-02-20 RX ORDER — ENOXAPARIN SODIUM 100 MG/ML
INJECTION SUBCUTANEOUS
Qty: 21 SYRINGE | Refills: 0 | Status: SHIPPED | OUTPATIENT
Start: 2018-02-20 | End: 2018-03-24

## 2018-02-20 RX ORDER — WARFARIN SODIUM 6 MG/1
TABLET ORAL
COMMUNITY
End: 2018-03-24 | Stop reason: DRUGHIGH

## 2018-02-20 RX ORDER — WARFARIN SODIUM 1 MG/1
TABLET ORAL
COMMUNITY
End: 2018-03-24 | Stop reason: DRUGHIGH

## 2018-02-20 NOTE — PROGRESS NOTES
Here for INR check in coumadin clinic in the office. Current coumadin dosage:   6mg Monday and 8mg daily the rest of the week.      COMPLAINTS/CHANGES:  2/27/18 scheduled for colonoscopy with Dr. Romayne Ralph at [facility]  3/5/18 scheduled for eyelid surgery ( excis

## 2018-02-20 NOTE — PATIENT INSTRUCTIONS
INR in goal range. Continue same coumadin dose until advised by Dr. Luz Wilkins. She will instruct you as to what to do with coumadin dosing for procedures. Next INR on Monday 2/26/17 the day prior to colonoscopy.      Watch for bleeding such as black

## 2018-02-20 NOTE — PROGRESS NOTES
[facility] [place]  PROGRESS NOTE  Chief Complaint:   Patient presents with:  Pre-Op Exam: Preoperative clearance to reconstruct left lower eyelid after motts with Dr. Edward Noriega on 3/7/18      HPI:   This is a 69year old female coming in for  Preop Drug use:  No              Family History:  Family History   Problem Relation Age of Onset   • Heart Disorder Mother    • Cancer Son    • Heart Disorder Brother    • Heart Disorder Brother    • Heart Disorder Brother    • Stroke Brother              Past Hi bro   Social History (reviewed - no changes required): Kitty Dill lives with her . She lives at home in [place]. She is  with 3 children. 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     Son passed sputum. GASTROINTESTINAL:  Denies abdominal pain, nausea, vomiting, constipation, diarrhea, or blood in stool. MUSCULOSKELETAL:  Denies weakness, muscle aches, back pain, joint pain, swelling or stiffness.   NEUROLOGICAL:  Denies headache, seizures, dizzi dorsalis pedis pulses bilaterally. NEURO:  No deficit, normal gait, strength and tone, sensory intact, normal reflexes. ASSESSMENT AND PLAN:   1. Preop examination  Patient with multiple medical issues. Patient has treated and stable at present.   Disc Diaphragmatic hernia     Hypertension     Familial multiple lipoprotein-type hyperlipidemia     Obstructive sleep apnea     Osteoarthrosis     Periodic limb movement disorder     Rectocele     Other psoriasis     Osteopenia of spine      Patient Instructio

## 2018-02-20 NOTE — PATIENT INSTRUCTIONS
rec lovenox bridge preop until cleared by surgery    Recheck post op --appt for bursitis if desires-- ice to elbow prn for now    Continue other medications    No coumadin, aspirin, plavix or Nsaid.     Restart coumadin pending surgical outcome and recommen

## 2018-02-22 ENCOUNTER — TELEPHONE (OUTPATIENT)
Dept: FAMILY MEDICINE CLINIC | Facility: CLINIC | Age: 78
End: 2018-02-22

## 2018-02-22 NOTE — TELEPHONE ENCOUNTER
----- Message from Kaye Edmondson MD sent at 2/22/2018  8:01 AM CST -----  Lab studies reviewed  Urine culture negative  Cbc-normal  Chem panel in range. Pt medically stable.

## 2018-02-26 ENCOUNTER — ANTI-COAG VISIT (OUTPATIENT)
Dept: FAMILY MEDICINE CLINIC | Facility: CLINIC | Age: 78
End: 2018-02-26

## 2018-02-26 DIAGNOSIS — I48.91 ATRIAL FIBRILLATION, UNSPECIFIED TYPE (HCC): ICD-10-CM

## 2018-02-26 DIAGNOSIS — Z79.01 LONG TERM (CURRENT) USE OF ANTICOAGULANTS: ICD-10-CM

## 2018-02-26 LAB — INR: 1.1 (ref 0.8–1.2)

## 2018-02-26 PROCEDURE — 93793 ANTICOAG MGMT PT WARFARIN: CPT | Performed by: FAMILY MEDICINE

## 2018-02-26 PROCEDURE — 85610 PROTHROMBIN TIME: CPT | Performed by: FAMILY MEDICINE

## 2018-02-26 NOTE — PROGRESS NOTES
Here for preop INR check in coumadin clinic in the office.     Current coumadin dosage:  HOLDING coumadin, on Lovenox bridge    COMPLAINTS/CHANGES:  2/27/18 Colonoscopy Dr. Janeth Hernadez  3/5/18 Eyelid excision for cancer Dr. Nessa Demarco  3/7/18 Eyelid reconstruction

## 2018-02-26 NOTE — PATIENT INSTRUCTIONS
INR is down for procedure. Continue to HOLD coumadin until after eye reconstruction. HOLD lovenox now until after Colonoscopy. Resume lovenox when advised by Dr. Ruiz Robledo. Once you resume lovenox after procedure continue until eye cancer procedure.

## 2018-03-01 ENCOUNTER — TELEPHONE (OUTPATIENT)
Dept: FAMILY MEDICINE CLINIC | Facility: CLINIC | Age: 78
End: 2018-03-01

## 2018-03-01 NOTE — TELEPHONE ENCOUNTER
Called Dr. Holly Ribeiro office. Patient's paper work from Dr. Bhavana Chacon states she did not need to hold coumadin for skin excision of eye lid.    Now that patient is on lovenox bridge instead of coumadin, will Dr. Bhavana Chacon need patient to hold lovenox the day before

## 2018-03-01 NOTE — TELEPHONE ENCOUNTER
Per Preston Leo at Dr. Montano Foot office, patient does not need to hold lovenox for eyelid excision. Call to Dr. Ramona Mahoney, plastic surgeon.  He will be doing surgical repair of eyelid ( plastic surgery) on 3/7/18   Spoke with Dr. Nataliia Silver assistant and she asked

## 2018-03-02 ENCOUNTER — TELEPHONE (OUTPATIENT)
Dept: FAMILY MEDICINE CLINIC | Facility: CLINIC | Age: 78
End: 2018-03-02

## 2018-03-02 NOTE — TELEPHONE ENCOUNTER
Lovenox instructions given to patient. Advised to continue lovenox until next INR appointment on 3/12/18 as advised by Dr. Hayder Taylor and Dr. Whit Waters. Patient to ask Dr. Whit Waters after surgical repair of eyelid when she may start her coumadin?    Once she restar

## 2018-03-09 ENCOUNTER — OFFICE VISIT (OUTPATIENT)
Dept: FAMILY MEDICINE CLINIC | Facility: CLINIC | Age: 78
End: 2018-03-09

## 2018-03-09 ENCOUNTER — ANTI-COAG VISIT (OUTPATIENT)
Dept: FAMILY MEDICINE CLINIC | Facility: CLINIC | Age: 78
End: 2018-03-09

## 2018-03-09 VITALS
HEIGHT: 61 IN | RESPIRATION RATE: 18 BRPM | SYSTOLIC BLOOD PRESSURE: 112 MMHG | DIASTOLIC BLOOD PRESSURE: 60 MMHG | BODY MASS INDEX: 44.22 KG/M2 | TEMPERATURE: 98 F | WEIGHT: 234.25 LBS | OXYGEN SATURATION: 94 % | HEART RATE: 74 BPM

## 2018-03-09 DIAGNOSIS — K92.0 HEMATEMESIS WITHOUT NAUSEA: ICD-10-CM

## 2018-03-09 DIAGNOSIS — Z79.01 LONG TERM (CURRENT) USE OF ANTICOAGULANTS: ICD-10-CM

## 2018-03-09 DIAGNOSIS — C44.310 FACIAL BASAL CELL CANCER: ICD-10-CM

## 2018-03-09 DIAGNOSIS — Z98.890 POST-OPERATIVE STATE: Primary | ICD-10-CM

## 2018-03-09 DIAGNOSIS — I48.91 ATRIAL FIBRILLATION, UNSPECIFIED TYPE (HCC): ICD-10-CM

## 2018-03-09 LAB
CUVETTE LOT #: ABNORMAL NUMERIC
HEMOGLOBIN: 10.8 G/DL (ref 12–15)
INR: 0.9 (ref 0.8–1.2)

## 2018-03-09 PROCEDURE — 99214 OFFICE O/P EST MOD 30 MIN: CPT | Performed by: FAMILY MEDICINE

## 2018-03-09 PROCEDURE — 85018 HEMOGLOBIN: CPT | Performed by: FAMILY MEDICINE

## 2018-03-09 PROCEDURE — 85610 PROTHROMBIN TIME: CPT | Performed by: FAMILY MEDICINE

## 2018-03-09 RX ORDER — ENOXAPARIN SODIUM 150 MG/ML
INJECTION SUBCUTANEOUS
COMMUNITY
End: 2018-03-16 | Stop reason: ALTCHOICE

## 2018-03-09 RX ORDER — PANTOPRAZOLE SODIUM 40 MG/1
40 TABLET, DELAYED RELEASE ORAL DAILY
COMMUNITY
Start: 2018-02-27 | End: 2019-02-20 | Stop reason: DRUGHIGH

## 2018-03-09 NOTE — PATIENT INSTRUCTIONS
rec INR and HGB today    Continue Lovenox   NO COUMADIN    Recheck Monday-- 3/ 12     If increased Bleeding-- then pt should go to emergency room

## 2018-03-09 NOTE — PROGRESS NOTES
Per CR, pt instructed to hold coumadin until recheck on Monday.  Pt is continuing with Lovenox BID  Recheck appt's scheduled on Monday  Future Appointments  Date Time Provider Cora Palafox   3/12/2018 8:30 AM Sarah Buckley MD [facility] [place] [facility]

## 2018-03-10 NOTE — PROGRESS NOTES
This INR visit noted. Per Dr. Celine Grayson office visit note patient has a drain from eyelid surgery into nare. ( see office note 3/9/18)   Has been having bloody drainage from drain into nose and throat.    Dr. Kay Rangel had patient hold coumadin until ne

## 2018-03-14 ENCOUNTER — TELEPHONE (OUTPATIENT)
Dept: FAMILY MEDICINE CLINIC | Facility: CLINIC | Age: 78
End: 2018-03-14

## 2018-03-14 LAB — INR: 1 (ref 0.8–1.2)

## 2018-03-14 NOTE — TELEPHONE ENCOUNTER
Per Dr. Greer Pass patient will be discharged from hospital today after having a heart attack and most likely a coronary embolism due to A-fib. Will be on plavix due to cardiac stent placement, but NO ASPIRIN. INR today 1.1.    He is restarting her coumadin

## 2018-03-16 ENCOUNTER — HOSPITAL ENCOUNTER (OUTPATIENT)
Dept: GENERAL RADIOLOGY | Age: 78
Discharge: HOME | End: 2018-03-16
Attending: NURSE PRACTITIONER
Payer: MEDICARE

## 2018-03-16 ENCOUNTER — ANTI-COAG VISIT (OUTPATIENT)
Dept: FAMILY MEDICINE CLINIC | Facility: CLINIC | Age: 78
End: 2018-03-16

## 2018-03-16 ENCOUNTER — OFFICE VISIT (OUTPATIENT)
Dept: FAMILY MEDICINE CLINIC | Facility: CLINIC | Age: 78
End: 2018-03-16

## 2018-03-16 VITALS
WEIGHT: 220.81 LBS | BODY MASS INDEX: 42 KG/M2 | TEMPERATURE: 99 F | DIASTOLIC BLOOD PRESSURE: 52 MMHG | HEART RATE: 52 BPM | SYSTOLIC BLOOD PRESSURE: 108 MMHG | OXYGEN SATURATION: 98 %

## 2018-03-16 DIAGNOSIS — Z79.01 LONG TERM (CURRENT) USE OF ANTICOAGULANTS: ICD-10-CM

## 2018-03-16 DIAGNOSIS — R05.9 COUGH: ICD-10-CM

## 2018-03-16 DIAGNOSIS — J40 BRONCHITIS: Primary | ICD-10-CM

## 2018-03-16 DIAGNOSIS — I48.91 ATRIAL FIBRILLATION, UNSPECIFIED TYPE (HCC): ICD-10-CM

## 2018-03-16 DIAGNOSIS — I21.4 NSTEMI (NON-ST ELEVATED MYOCARDIAL INFARCTION) (HCC): ICD-10-CM

## 2018-03-16 PROBLEM — I48.21 PERMANENT ATRIAL FIBRILLATION (HCC): Status: ACTIVE | Noted: 2017-02-10

## 2018-03-16 PROBLEM — K21.00 GASTROESOPHAGEAL REFLUX DISEASE WITH ESOPHAGITIS: Status: ACTIVE | Noted: 2017-11-22

## 2018-03-16 PROBLEM — IMO0001 CLASS 3 OBESITY DUE TO EXCESS CALORIES IN ADULT: Status: ACTIVE | Noted: 2017-11-22

## 2018-03-16 PROBLEM — G45.9 TRANSIENT CEREBRAL ISCHEMIA: Status: ACTIVE | Noted: 2017-03-09

## 2018-03-16 LAB — INR: 1.3 (ref 0.8–1.2)

## 2018-03-16 PROCEDURE — 85610 PROTHROMBIN TIME: CPT | Performed by: FAMILY MEDICINE

## 2018-03-16 PROCEDURE — 71046 X-RAY EXAM CHEST 2 VIEWS: CPT | Performed by: NURSE PRACTITIONER

## 2018-03-16 PROCEDURE — 99215 OFFICE O/P EST HI 40 MIN: CPT | Performed by: NURSE PRACTITIONER

## 2018-03-16 PROCEDURE — 1111F DSCHRG MED/CURRENT MED MERGE: CPT | Performed by: NURSE PRACTITIONER

## 2018-03-16 RX ORDER — ALBUTEROL SULFATE 90 UG/1
AEROSOL, METERED RESPIRATORY (INHALATION)
Qty: 1 INHALER | Refills: 1 | Status: SHIPPED | OUTPATIENT
Start: 2018-03-16 | End: 2018-10-30

## 2018-03-16 RX ORDER — LISINOPRIL 5 MG/1
5 TABLET ORAL DAILY
COMMUNITY
Start: 2018-03-14 | End: 2018-05-16

## 2018-03-16 RX ORDER — LEVALBUTEROL INHALATION SOLUTION 1.25 MG/3ML
1.25 SOLUTION RESPIRATORY (INHALATION) ONCE
Status: DISCONTINUED | OUTPATIENT
Start: 2018-03-16 | End: 2019-02-20

## 2018-03-16 RX ORDER — CLOPIDOGREL BISULFATE 75 MG/1
75 TABLET ORAL
COMMUNITY
Start: 2018-03-15 | End: 2018-05-16

## 2018-03-16 RX ORDER — INHALER, ASSIST DEVICES
SPACER (EA) MISCELLANEOUS
Qty: 1 DEVICE | Refills: 0 | Status: SHIPPED | OUTPATIENT
Start: 2018-03-16 | End: 2021-01-21

## 2018-03-16 RX ORDER — AZITHROMYCIN 250 MG/1
TABLET, FILM COATED ORAL
Qty: 6 TABLET | Refills: 0 | Status: SHIPPED | OUTPATIENT
Start: 2018-03-16 | End: 2018-03-24 | Stop reason: ALTCHOICE

## 2018-03-16 RX ORDER — WARFARIN SODIUM 7.5 MG/1
7.5 TABLET ORAL DAILY
COMMUNITY
Start: 2018-03-14 | End: 2018-03-24 | Stop reason: DRUGHIGH

## 2018-03-16 RX ORDER — POTASSIUM CHLORIDE 750 MG/1
10 TABLET, EXTENDED RELEASE ORAL DAILY
COMMUNITY
Start: 2018-03-14 | End: 2018-04-13

## 2018-03-16 RX ORDER — METOPROLOL SUCCINATE 50 MG/1
50 TABLET, EXTENDED RELEASE ORAL 2 TIMES DAILY
COMMUNITY
Start: 2018-03-14 | End: 2018-04-13

## 2018-03-16 RX ORDER — ENOXAPARIN SODIUM 100 MG/ML
107 INJECTION SUBCUTANEOUS 2 TIMES DAILY
COMMUNITY
Start: 2018-03-14 | End: 2018-03-24 | Stop reason: ALTCHOICE

## 2018-03-16 NOTE — PATIENT INSTRUCTIONS
Start Zithromax take as directed for 5 days    Use your pro-air inhaler with the spacer every 4 hours as needed for cough or wheezing.     Follow-up tomorrow for an INR 10:30 AM.    Follow-up on Monday for an INR with Saji Verma     Take 8 mg of Coumadin once a d

## 2018-03-16 NOTE — PROGRESS NOTES
Here for INR check in coumadin clinic in the office. Also has appointment with provider for SONIA Rogers NP. CURRENT COUMADIN DOSAGE:   7.5mg and lovenox twice daily. Resumed coumadin 3 days ago.      COMPLAINTS/CHANGES:  Post eyelid surgery fo

## 2018-03-16 NOTE — TELEPHONE ENCOUNTER
Please have pt f.u with me next week as well.     Future Appointments  Date Time Provider Cora Palafox   3/16/2018 11:00 AM [facility] COUMADIN NURSE [facility] [place] [facility]   3/16/2018 11:15 AM ZAHRAA Mendieta [facility] [place] [facility]

## 2018-03-16 NOTE — PATIENT INSTRUCTIONS
INR just starting to rise toward goal range after resuming coumadin on Wednesday. This is expected. INR today 1.3. Increase coumadin to 8mg every day. INR again tomorrow at 10:30am and again on Monday at 10:30am.   Continue lovenox injections.      P

## 2018-03-16 NOTE — PROGRESS NOTES
Rocio Bright is a 69year old female. Patient presents with:  [facility] F/U: myocardial infraction       HPI:   Patient has a history of atrial fibrillation–states she was on Coumadin for this.   Then she had colonoscopy Feb, 2018-  On Shai movement disorder     Rectocele     Other psoriasis     Osteopenia of spine     Facial basal cell cancer     Hematemesis without nausea     Post-operative state     Class 3 obesity due to excess calories in adult     Coronary atherosclerosis of native donovan TAKE ONE TABLET BY MOUTH EVERY DAY Disp: 90 tablet Rfl: 1   Hydrocortisone 2.5 % External Lotion Apply 1 Application topically 2 (two) times daily.  Apply sparingly to affected area twice a day as needed Disp: 59 mL Rfl: 0      Past Medical History:   Diagn AND PLAN:     Cough  (primary encounter diagnosis)  Bronchitis  Nstemi (non-st elevated myocardial infarction) (h[facility])    No orders of the defined types were placed in this encounter.       Meds & Refills for this Visit:  Signed Prescriptions Disp Refills

## 2018-03-17 ENCOUNTER — ANTI-COAG VISIT (OUTPATIENT)
Dept: FAMILY MEDICINE CLINIC | Facility: CLINIC | Age: 78
End: 2018-03-17

## 2018-03-17 DIAGNOSIS — Z79.01 LONG TERM (CURRENT) USE OF ANTICOAGULANTS: ICD-10-CM

## 2018-03-17 DIAGNOSIS — I48.21 PERMANENT ATRIAL FIBRILLATION (HCC): ICD-10-CM

## 2018-03-17 LAB — INR: 1.5 (ref 0.8–1.2)

## 2018-03-17 PROCEDURE — 85610 PROTHROMBIN TIME: CPT | Performed by: FAMILY MEDICINE

## 2018-03-17 PROCEDURE — 99211 OFF/OP EST MAY X REQ PHY/QHP: CPT | Performed by: FAMILY MEDICINE

## 2018-03-17 NOTE — PROGRESS NOTES
Patient to go to the emergency room for evaluation of pneumonia/fever. We recommend continuing 8 mg and repeating INR on Monday if she is not hospitalized.

## 2018-03-24 ENCOUNTER — OFFICE VISIT (OUTPATIENT)
Dept: FAMILY MEDICINE CLINIC | Facility: CLINIC | Age: 78
End: 2018-03-24

## 2018-03-24 VITALS
DIASTOLIC BLOOD PRESSURE: 60 MMHG | BODY MASS INDEX: 40.78 KG/M2 | HEIGHT: 61 IN | WEIGHT: 216 LBS | HEART RATE: 70 BPM | RESPIRATION RATE: 18 BRPM | TEMPERATURE: 99 F | OXYGEN SATURATION: 93 % | SYSTOLIC BLOOD PRESSURE: 96 MMHG

## 2018-03-24 DIAGNOSIS — J10.00 PNEUMONIA DUE TO INFLUENZA A VIRUS, UNSPECIFIED LATERALITY, UNSPECIFIED PART OF LUNG: ICD-10-CM

## 2018-03-24 DIAGNOSIS — Z79.01 LONG TERM (CURRENT) USE OF ANTICOAGULANTS: ICD-10-CM

## 2018-03-24 DIAGNOSIS — Z98.890 POST-OPERATIVE STATE: ICD-10-CM

## 2018-03-24 DIAGNOSIS — I48.21 PERMANENT ATRIAL FIBRILLATION (HCC): ICD-10-CM

## 2018-03-24 DIAGNOSIS — C44.310 FACIAL BASAL CELL CANCER: ICD-10-CM

## 2018-03-24 DIAGNOSIS — I21.4 NSTEMI (NON-ST ELEVATED MYOCARDIAL INFARCTION) (HCC): Primary | ICD-10-CM

## 2018-03-24 DIAGNOSIS — G47.33 OBSTRUCTIVE SLEEP APNEA: ICD-10-CM

## 2018-03-24 DIAGNOSIS — I10 ESSENTIAL HYPERTENSION, BENIGN: ICD-10-CM

## 2018-03-24 PROCEDURE — 99215 OFFICE O/P EST HI 40 MIN: CPT | Performed by: FAMILY MEDICINE

## 2018-03-24 PROCEDURE — 85610 PROTHROMBIN TIME: CPT | Performed by: FAMILY MEDICINE

## 2018-03-24 PROCEDURE — 1111F DSCHRG MED/CURRENT MED MERGE: CPT | Performed by: FAMILY MEDICINE

## 2018-03-24 RX ORDER — FUROSEMIDE 20 MG/1
20 TABLET ORAL DAILY
COMMUNITY
End: 2018-07-16

## 2018-03-24 RX ORDER — IPRATROPIUM BROMIDE AND ALBUTEROL SULFATE 2.5; .5 MG/3ML; MG/3ML
3 SOLUTION RESPIRATORY (INHALATION) EVERY 4 HOURS PRN
COMMUNITY
Start: 2018-03-23 | End: 2020-10-05

## 2018-03-24 RX ORDER — CEFUROXIME AXETIL 500 MG/1
500 TABLET ORAL 2 TIMES DAILY
COMMUNITY
Start: 2018-03-23 | End: 2018-04-04

## 2018-03-24 RX ORDER — PREDNISONE 20 MG/1
20 TABLET ORAL DAILY
COMMUNITY
End: 2018-04-04 | Stop reason: ALTCHOICE

## 2018-03-24 RX ORDER — WARFARIN SODIUM 4 MG/1
8 TABLET ORAL DAILY
COMMUNITY
End: 2018-04-25 | Stop reason: DRUGHIGH

## 2018-03-24 NOTE — PROGRESS NOTES
[facility] [place]  PROGRESS NOTE  Chief Complaint:   Patient presents with:  Hospital F/U      HPI:   This is a 69year old female coming in for  Medical follow-up posthospitalization. Patient's had a complicated 6 weeks of medical care.   Lyn date:  LUMPECTOMY RIGHT  No date: OTHER      Comment: Surgery on both feet-- bunion  No date: REMOVAL GALLBLADDER  Social History:  Social History    Marital status:              Spouse name:                       Years of education: hours as needed Disp: 1 Inhaler Rfl: 1   Spacer/Aero-Holding Chambers (AEROCHAMBER MINI CHAMBER) Does not apply Device Use as directed with proair Disp: 1 Device Rfl: 0   Pantoprazole Sodium 40 MG Oral Tab EC Take 40 mg by mouth daily.  Disp:  Rfl:    Georgette 99.3 °F (37.4 °C) (Tympanic)   Resp 18   Ht 61\"   Wt 216 lb   SpO2 93%   BMI 40.81 kg/m²  Estimated body mass index is 40.81 kg/m² as calculated from the following:    Height as of this encounter: 61\". Weight as of this encounter: 216 lb.    Vital sign drainage stent still in the lid and follow-up postoperatively. 7. Post-operative state      8.  Long term (current) use of anticoagulants [Z79.01]        Problem List:  Patient Active Problem List:     Long term (current) use of anticoagulants [Z79.01] today.

## 2018-03-24 NOTE — PATIENT INSTRUCTIONS
rec dietary consult-- SUZANNA     INR today    F.u next week- labs then     plan for sleep eval whenrespiratory illness resolved    Coumadin 9 mg today  Coumadin 8 mg tomorrow  INR Monday    INR WED and appt with Mary Ann

## 2018-03-26 ENCOUNTER — ANTI-COAG VISIT (OUTPATIENT)
Dept: FAMILY MEDICINE CLINIC | Facility: CLINIC | Age: 78
End: 2018-03-26

## 2018-03-26 VITALS — OXYGEN SATURATION: 96 %

## 2018-03-26 DIAGNOSIS — I48.21 PERMANENT ATRIAL FIBRILLATION (HCC): ICD-10-CM

## 2018-03-26 DIAGNOSIS — Z79.01 LONG TERM (CURRENT) USE OF ANTICOAGULANTS: ICD-10-CM

## 2018-03-26 LAB
INR: 1.8 (ref 0.8–1.2)
INR: 2.1 (ref 0.8–1.2)

## 2018-03-26 PROCEDURE — 93793 ANTICOAG MGMT PT WARFARIN: CPT | Performed by: FAMILY MEDICINE

## 2018-03-26 PROCEDURE — 85610 PROTHROMBIN TIME: CPT | Performed by: FAMILY MEDICINE

## 2018-03-26 NOTE — PROGRESS NOTES
Here for INR check in coumadin clinic in the office. CURRENT COUMADIN DOSAGE:   9mg Sat and 8mg daily the rest of the week. ( coumadin dose prior to admissions 6mg M and 8mg daily the rest of the week)     COMPLAINTS/CHANGES:  Recent admissions.  First

## 2018-03-28 ENCOUNTER — ANTI-COAG VISIT (OUTPATIENT)
Dept: FAMILY MEDICINE CLINIC | Facility: CLINIC | Age: 78
End: 2018-03-28

## 2018-03-28 ENCOUNTER — OFFICE VISIT (OUTPATIENT)
Dept: FAMILY MEDICINE CLINIC | Facility: CLINIC | Age: 78
End: 2018-03-28

## 2018-03-28 VITALS
HEIGHT: 61 IN | OXYGEN SATURATION: 92 % | DIASTOLIC BLOOD PRESSURE: 64 MMHG | RESPIRATION RATE: 16 BRPM | TEMPERATURE: 98 F | BODY MASS INDEX: 40.47 KG/M2 | SYSTOLIC BLOOD PRESSURE: 110 MMHG | HEART RATE: 64 BPM | WEIGHT: 214.38 LBS

## 2018-03-28 DIAGNOSIS — I21.4 NSTEMI (NON-ST ELEVATED MYOCARDIAL INFARCTION) (HCC): ICD-10-CM

## 2018-03-28 DIAGNOSIS — Z79.01 LONG TERM (CURRENT) USE OF ANTICOAGULANTS: ICD-10-CM

## 2018-03-28 DIAGNOSIS — I48.21 PERMANENT ATRIAL FIBRILLATION (HCC): ICD-10-CM

## 2018-03-28 DIAGNOSIS — I10 ESSENTIAL HYPERTENSION: ICD-10-CM

## 2018-03-28 DIAGNOSIS — J10.00 PNEUMONIA DUE TO INFLUENZA A VIRUS, UNSPECIFIED LATERALITY, UNSPECIFIED PART OF LUNG: Primary | ICD-10-CM

## 2018-03-28 LAB — INR: 2.8 (ref 0.8–1.2)

## 2018-03-28 PROCEDURE — 85610 PROTHROMBIN TIME: CPT | Performed by: FAMILY MEDICINE

## 2018-03-28 PROCEDURE — 99214 OFFICE O/P EST MOD 30 MIN: CPT | Performed by: FAMILY MEDICINE

## 2018-03-28 NOTE — PATIENT INSTRUCTIONS
Flu vaccine in the fall    Continue home PT    Encourage rest and hydration    Continue coumadin-- INR Monday    Complete steroid and antobiotics    f.u  2 weeks

## 2018-03-28 NOTE — PROGRESS NOTES
[address]  PROGRESS NOTE  Chief Complaint:   Patient presents with: Follow - Up: INR      HPI:   This is a 69year old female coming in for medical f.u  Patient notes that she continues with cough but does feel it is improving.   She al Packs/day: 0.00      Years: 0.00           Quit date: 3/15/1971    Smokeless tobacco: Never Used                        Alcohol use: Yes           0.0 oz/week       Comment: rare-  0-1 per month Application topically 2 (two) times daily. Apply sparingly to affected area twice a day as needed Disp: 59 mL Rfl: 0      Counseling given: Not Answered       REVIEW OF SYSTEMS:   CONSTITUTIONAL:  Denies unusual weight gain/loss, fever, chills, or fatigue. Eyes: EOMI, PERRLA, no scleral icterus, conjunctivae clear bilaterally, no eye discharge Ears: External normal. Nose: patent, no nasal discharge Throat:  No tonsillar erythema or exudate. Mouth:  No oral lesions or ulcerations, good dentition.   NECK: Supp Pneumonia due to influenza A virus      Patient Instructions   Flu vaccine in the fall    Continue home PT    Encourage rest and hydration    Continue coumadin-- INR Monday    Complete steroid and antobiotics    f.u  2 weeks            Meds & Refills for t

## 2018-03-30 ENCOUNTER — TELEPHONE (OUTPATIENT)
Dept: FAMILY MEDICINE CLINIC | Facility: CLINIC | Age: 78
End: 2018-03-30

## 2018-03-30 NOTE — TELEPHONE ENCOUNTER
Informed pt if the pleurisy is getting worse. Pt should go to ER. I tried to explain to pt that she has been through a lot and should go to ER. Pt expressed understanding and thanks.

## 2018-03-30 NOTE — TELEPHONE ENCOUNTER
Pt states her pleurisy started on 3/28/18. Pt is not sleeping, laying down the pain increases around her lungs. No fever. What can she take for he pleurisy. Please advise.

## 2018-03-30 NOTE — TELEPHONE ENCOUNTER
Pt should return for f.u tomorrow if not improving. Pt with recent MI and influenza pneumonia. Tylenol can be take.  If severe pain- she should go to ED

## 2018-04-02 ENCOUNTER — TELEPHONE (OUTPATIENT)
Dept: FAMILY MEDICINE CLINIC | Facility: CLINIC | Age: 78
End: 2018-04-02

## 2018-04-02 ENCOUNTER — ANTI-COAG VISIT (OUTPATIENT)
Dept: FAMILY MEDICINE CLINIC | Facility: CLINIC | Age: 78
End: 2018-04-02

## 2018-04-02 DIAGNOSIS — I48.21 PERMANENT ATRIAL FIBRILLATION (HCC): ICD-10-CM

## 2018-04-02 DIAGNOSIS — Z79.01 LONG TERM (CURRENT) USE OF ANTICOAGULANTS: ICD-10-CM

## 2018-04-02 NOTE — TELEPHONE ENCOUNTER
Patient informed of recommendations. Expressed understanding. appt scheduled.  .  Your appointments     Date & Time Appointment Department [facility])    Apr 04, 2018  2:00 PM CDT Exam - Established Patient with MD [employer], Stat

## 2018-04-02 NOTE — TELEPHONE ENCOUNTER
having prob with pleurasy since heart attack. did not go to the ER last week .  wants to know what she should do

## 2018-04-02 NOTE — TELEPHONE ENCOUNTER
Patient was advised to ER on 3/30/18. Patient did not because she started to feel better. Hx of myocardial infraction. Patient states is believes she is experiencing pleurisy. States this is the 5th day she has had this pain.  When she doesn't take her

## 2018-04-02 NOTE — PROGRESS NOTES
Message left for Dr. Regina Hernandez at his office regarding elevated INR as advised by Dr. Chris Ly. Discussed with Dr. Chris Ly again and due to thrombus in heart it was decided that patient will have INR checked tomorrow.    [place] at [facility] notif

## 2018-04-02 NOTE — PROGRESS NOTES
INR checked by home care nurse. CURRENT COUMADIN DOSAGE:   6mg F and 8mg daily the rest of the week.     COMPLAINTS/CHANGES:  Finished antibiotics  Recent admissions following eyelid surgery for CA, Post op MI with cardiac stent placement, then readmitte

## 2018-04-03 ENCOUNTER — ANTI-COAG VISIT (OUTPATIENT)
Dept: FAMILY MEDICINE CLINIC | Facility: CLINIC | Age: 78
End: 2018-04-03

## 2018-04-03 DIAGNOSIS — Z79.01 LONG TERM (CURRENT) USE OF ANTICOAGULANTS: ICD-10-CM

## 2018-04-03 DIAGNOSIS — I48.21 PERMANENT ATRIAL FIBRILLATION (HCC): ICD-10-CM

## 2018-04-03 NOTE — PROGRESS NOTES
INR checked by home care nurse. CURRENT COUMADIN DOSAGE:   HOLDING    COMPLAINTS/CHANGES:  Recent admit MI and stent placement, then re-admitted for pneumonia. Last discharge 3/24- antibiotics stopped.      INR RESULTS TODAY:   4.2 ( still elevated)

## 2018-04-04 ENCOUNTER — ANTI-COAG VISIT (OUTPATIENT)
Dept: FAMILY MEDICINE CLINIC | Facility: CLINIC | Age: 78
End: 2018-04-04

## 2018-04-04 ENCOUNTER — OFFICE VISIT (OUTPATIENT)
Dept: FAMILY MEDICINE CLINIC | Facility: CLINIC | Age: 78
End: 2018-04-04

## 2018-04-04 VITALS
RESPIRATION RATE: 16 BRPM | WEIGHT: 217.38 LBS | SYSTOLIC BLOOD PRESSURE: 108 MMHG | HEART RATE: 84 BPM | DIASTOLIC BLOOD PRESSURE: 60 MMHG | TEMPERATURE: 99 F | BODY MASS INDEX: 41.04 KG/M2 | OXYGEN SATURATION: 98 % | HEIGHT: 61 IN

## 2018-04-04 DIAGNOSIS — R07.81 RIB PAIN: Primary | ICD-10-CM

## 2018-04-04 DIAGNOSIS — R09.1 PLEURITIS: ICD-10-CM

## 2018-04-04 DIAGNOSIS — I48.21 PERMANENT ATRIAL FIBRILLATION (HCC): ICD-10-CM

## 2018-04-04 DIAGNOSIS — Z79.01 LONG TERM (CURRENT) USE OF ANTICOAGULANTS: ICD-10-CM

## 2018-04-04 PROCEDURE — 99214 OFFICE O/P EST MOD 30 MIN: CPT | Performed by: FAMILY MEDICINE

## 2018-04-04 PROCEDURE — 85610 PROTHROMBIN TIME: CPT | Performed by: FAMILY MEDICINE

## 2018-04-04 RX ORDER — PREDNISONE 20 MG/1
20 TABLET ORAL 2 TIMES DAILY
Qty: 10 TABLET | Refills: 0 | Status: SHIPPED | OUTPATIENT
Start: 2018-04-04 | End: 2018-04-09

## 2018-04-04 NOTE — PATIENT INSTRUCTIONS
INR back down into goal range. Will need to take a lower coumadin dose while taking prednisone. Restart coumadin today at 6mg daily   Next INR due on Friday. Appointment has been scheduled.      Watch for bleeding such as black tarry stool, bloody stoo

## 2018-04-04 NOTE — PROGRESS NOTES
INR checked at office visit with provider. CURRENT COUMADIN DOSAGE:   HOLDING for 2 days  ( regular dose is 8mg daily)     COMPLAINTS/CHANGES:  Recent admission for pneumonia ( IV antibiotics during admission)   Recent MI, Post eyelid surgery.  Thrombus

## 2018-04-04 NOTE — PATIENT INSTRUCTIONS
Recommend rest, heating pad. Start prednisone. Also use inhaler and nebulizer treatment as needed   Return to clinic if no improvement.    Follow up with PCP if symptoms persist.

## 2018-04-04 NOTE — PROGRESS NOTES
Discussed with Dr. Becca Valderrama. He is prescribing prednisone for patient today. PLAN:   Per Dr. Becca Valderrama  V.O. Resume coumadin at a decreased dose of 6mg daily. Next INR due on Friday in 2 days. Appointment scheduled.      AVS printed and given to jose luis

## 2018-04-04 NOTE — PROGRESS NOTES
[facility] [place]  PROGRESS NOTE  Chief Complaint:   Patient presents with:  Pain: pain in lung area, radiating to back, both sides      HPI:   This is a 69year old female presents complaining of pain in her lower rib cage on both side that ha Prescriptions:  predniSONE 20 MG Oral Tab Take 1 tablet (20 mg total) by mouth 2 (two) times daily. Disp: 10 tablet Rfl: 0   ipratropium-albuterol 0.5-2.5 (3) MG/3ML Inhalation Solution Inhale 3 mL into the lungs every 4 (four) hours as needed.  Disp:  Rfl: rest.  RESPIRATORY:  Denies shortness of breath, wheezing, cough or sputum. See HPI  GASTROINTESTINAL:  Denies abdominal pain, nausea, vomiting, constipation, diarrhea, or blood in stool.   MUSCULOSKELETAL:  Denies weakness, muscle aches, back pain, joint reflexes. PSYCHIATRIC: Alert and oriented x 3; affect appropriate, no depressed mood or anxiety      ASSESSMENT AND PLAN:   Alicia Becerra was seen today for pain.     Diagnoses and all orders for this visit:    Rib pain- both side    Pleuritis    Other orders  - ischemia     Pneumonia due to influenza A virus      Lorri Zapata MD

## 2018-04-06 ENCOUNTER — TELEPHONE (OUTPATIENT)
Dept: FAMILY MEDICINE CLINIC | Facility: CLINIC | Age: 78
End: 2018-04-06

## 2018-04-06 ENCOUNTER — ANTI-COAG VISIT (OUTPATIENT)
Dept: FAMILY MEDICINE CLINIC | Facility: CLINIC | Age: 78
End: 2018-04-06

## 2018-04-06 ENCOUNTER — OFFICE VISIT (OUTPATIENT)
Dept: FAMILY MEDICINE CLINIC | Facility: CLINIC | Age: 78
End: 2018-04-06

## 2018-04-06 ENCOUNTER — LAB ENCOUNTER (OUTPATIENT)
Dept: LAB | Age: 78
End: 2018-04-06
Attending: FAMILY MEDICINE
Payer: MEDICARE

## 2018-04-06 VITALS
SYSTOLIC BLOOD PRESSURE: 110 MMHG | DIASTOLIC BLOOD PRESSURE: 70 MMHG | RESPIRATION RATE: 18 BRPM | OXYGEN SATURATION: 97 % | TEMPERATURE: 98 F | WEIGHT: 224 LBS | BODY MASS INDEX: 42 KG/M2 | HEART RATE: 96 BPM

## 2018-04-06 DIAGNOSIS — I48.21 PERMANENT ATRIAL FIBRILLATION (HCC): ICD-10-CM

## 2018-04-06 DIAGNOSIS — M54.9 BACK PAIN, UNSPECIFIED BACK LOCATION, UNSPECIFIED BACK PAIN LATERALITY, UNSPECIFIED CHRONICITY: ICD-10-CM

## 2018-04-06 DIAGNOSIS — Z79.01 LONG TERM (CURRENT) USE OF ANTICOAGULANTS: ICD-10-CM

## 2018-04-06 DIAGNOSIS — S29.011S INTERCOSTAL MUSCLE STRAIN, SEQUELA: Primary | ICD-10-CM

## 2018-04-06 PROCEDURE — 36415 COLL VENOUS BLD VENIPUNCTURE: CPT

## 2018-04-06 PROCEDURE — 99214 OFFICE O/P EST MOD 30 MIN: CPT | Performed by: FAMILY MEDICINE

## 2018-04-06 PROCEDURE — 81003 URINALYSIS AUTO W/O SCOPE: CPT | Performed by: FAMILY MEDICINE

## 2018-04-06 PROCEDURE — 85610 PROTHROMBIN TIME: CPT | Performed by: FAMILY MEDICINE

## 2018-04-06 PROCEDURE — 85025 COMPLETE CBC W/AUTO DIFF WBC: CPT

## 2018-04-06 PROCEDURE — 80053 COMPREHEN METABOLIC PANEL: CPT

## 2018-04-06 NOTE — TELEPHONE ENCOUNTER
FYI:  Pt was already scheduled to see Dr. Lalo Jones this afternoon. Pt also will need INR. Pt was seen on 4/4 per Dr. Leora Tamez- was told to have INR checked on Friday. Pt states she will await appt to address.

## 2018-04-06 NOTE — TELEPHONE ENCOUNTER
Pt states that she is having bad lower back pain and possible pluerisy. There are no available appointments at this time. Pt wants to know what she should do.

## 2018-04-06 NOTE — PATIENT INSTRUCTIONS
Stop prednisone  May use tylenol as needed  Moist heat 10-15 minutes several times / day  Resume normal coumadin dosage  Recheck INR  Next week]  Await labs

## 2018-04-06 NOTE — PROGRESS NOTES
Pt appt with Dr. Landy Gee today- instructions to take another dose of 6 mg (today) and 8mg then daily until INR recheck on 4/10/18- back to normal schedule as per MD.

## 2018-04-07 ENCOUNTER — TELEPHONE (OUTPATIENT)
Dept: FAMILY MEDICINE CLINIC | Facility: CLINIC | Age: 78
End: 2018-04-07

## 2018-04-07 NOTE — TELEPHONE ENCOUNTER
----- Message from Pat Escobar MD sent at 4/6/2018 10:32 PM CDT -----  Labs good  No suggestion of infection  No antibiotics indicted

## 2018-04-09 NOTE — PROGRESS NOTES
[facility] [place]  PROGRESS NOTE  Chief Complaint:   Patient presents with:  Back Pain: mid back pain come around sides Luz Maria Jean-Baptiste has got worse      HPI:   This is a 69year old female coming in for further evaluation of her which she describes as Negative Negative mg/dL   SPECIFIC GRAVITY 1.025 1.005 - 1.030   OCCULT BLOOD Trace-intact Negative   PH, URINE 5.5 4.5 - 8.0   PROTEIN (URINE DIPSTICK) Negative Negative/Trace mg/dL   UROBILINOGEN,SEMI-QN 0.2 0.0 - 1.9 mg/dL   NITRITE, URINE Negative Nega (two) times daily. Disp:  Rfl:    Potassium Chloride ER 10 MEQ Oral Tab CR Take 10 mEq by mouth daily. Disp:  Rfl:    lisinopril 5 MG Oral Tab Take 5 mg by mouth daily.  Disp:  Rfl:    Albuterol Sulfate HFA (PROAIR HFA) 108 (90 Base) MCG/ACT Inhalation Aero Denies excessive sweating, cold or heat intolerance, polyuria or polydipsia. ALLERGIES:  Denies allergic response, history of asthma, sneezing, hives, eczema or rhinitis.      EXAM:   /70 (BP Location: Left arm, Patient Position: Sitting, Cuff Size: and all orders for this visit:    Intercostal muscle strain, sequela    Back pain, unspecified back location, unspecified back pain laterality, unspecified chronicity  -     URINALYSIS, AUTO, W/O SCOPE  -     CBC WITH DIFFERENTIAL WITH PLATELET;  Future  - disease     Diaphragmatic hernia     Hypertension     Familial multiple lipoprotein-type hyperlipidemia     Obstructive sleep apnea     Osteoarthrosis     Periodic limb movement disorder     Rectocele     Other psoriasis     Osteopenia of spine     Facial

## 2018-04-10 ENCOUNTER — ANTI-COAG VISIT (OUTPATIENT)
Dept: FAMILY MEDICINE CLINIC | Facility: CLINIC | Age: 78
End: 2018-04-10

## 2018-04-10 DIAGNOSIS — I48.21 PERMANENT ATRIAL FIBRILLATION (HCC): ICD-10-CM

## 2018-04-10 DIAGNOSIS — Z79.01 LONG TERM (CURRENT) USE OF ANTICOAGULANTS: ICD-10-CM

## 2018-04-10 PROCEDURE — 93793 ANTICOAG MGMT PT WARFARIN: CPT | Performed by: FAMILY MEDICINE

## 2018-04-10 PROCEDURE — 85610 PROTHROMBIN TIME: CPT | Performed by: FAMILY MEDICINE

## 2018-04-10 NOTE — PROGRESS NOTES
Here for INR check in coumadin clinic in the office.     CURRENT COUMADIN DOSAGE:   6mg F and 8mg daily the rest of the week    COMPLAINTS/CHANGES:  Still with muscle pain around ribs    INR RESULTS TODAY:   3.3 ( above goal range)     INR stevie quickly from

## 2018-04-10 NOTE — PATIENT INSTRUCTIONS
INR elevated at 3.3  Decrease coumadin to 6mg today. Tomorrow start 7mg daily. Watch for bleeding such as black tarry stool, bloody stool, blood in urine, unusual bruising or significant nose bleeds. Please call if these symptoms occur.    If you start

## 2018-04-13 ENCOUNTER — ANTI-COAG VISIT (OUTPATIENT)
Dept: FAMILY MEDICINE CLINIC | Facility: CLINIC | Age: 78
End: 2018-04-13

## 2018-04-13 DIAGNOSIS — Z79.01 LONG TERM (CURRENT) USE OF ANTICOAGULANTS: ICD-10-CM

## 2018-04-13 DIAGNOSIS — I48.21 PERMANENT ATRIAL FIBRILLATION (HCC): ICD-10-CM

## 2018-04-13 PROCEDURE — 93793 ANTICOAG MGMT PT WARFARIN: CPT | Performed by: FAMILY MEDICINE

## 2018-04-13 PROCEDURE — 85610 PROTHROMBIN TIME: CPT | Performed by: FAMILY MEDICINE

## 2018-04-13 RX ORDER — METOPROLOL SUCCINATE 50 MG/1
50 TABLET, EXTENDED RELEASE ORAL 2 TIMES DAILY
Qty: 180 TABLET | Refills: 0 | Status: SHIPPED | OUTPATIENT
Start: 2018-04-13 | End: 2018-07-02

## 2018-04-13 RX ORDER — POTASSIUM CHLORIDE 750 MG/1
10 TABLET, EXTENDED RELEASE ORAL DAILY
Qty: 90 TABLET | Refills: 0 | Status: SHIPPED | OUTPATIENT
Start: 2018-04-13 | End: 2018-07-02

## 2018-04-13 NOTE — TELEPHONE ENCOUNTER
Future appt:     Your appointments     Date & Time Appointment Department [facility])    Apr 19, 2018 11:15 AM CDT Anti-Coag with [facility] [address] [place] [address],[address], [place] ([place])    Apr 27, 2018  3:30 PM CDT Sergio Whittington

## 2018-04-13 NOTE — PROGRESS NOTES
Here for INR check in coumadin clinic in the office.     CURRENT COUMADIN DOSAGE:   6mg Tu and 7mg daily the rest of the week    COMPLAINTS/CHANGES:  No changes    INR RESULTS TODAY:   3.1 ( coming down, but still above goal)     PLAN:   Decrease coumadin t

## 2018-04-13 NOTE — PATIENT INSTRUCTIONS
INR still above goal.  Decrease coumadin to 6mg M,W,F and 7mg daily the rest of the week. INR next week. Watch for bleeding such as black tarry stool, bloody stool, blood in urine, unusual bruising or significant nose bleeds.  Please call if these sym

## 2018-04-17 RX ORDER — PREDNISONE 10 MG/1
20 TABLET ORAL 2 TIMES DAILY
COMMUNITY
End: 2018-04-27 | Stop reason: ALTCHOICE

## 2018-04-17 NOTE — PROGRESS NOTES
Fax received from [facility]. Per CR- med list updated. Pt is taking Prednisone 20 mg BID.

## 2018-04-19 ENCOUNTER — ANTI-COAG VISIT (OUTPATIENT)
Dept: FAMILY MEDICINE CLINIC | Facility: CLINIC | Age: 78
End: 2018-04-19

## 2018-04-19 DIAGNOSIS — I48.21 PERMANENT ATRIAL FIBRILLATION (HCC): ICD-10-CM

## 2018-04-19 DIAGNOSIS — Z79.01 LONG TERM (CURRENT) USE OF ANTICOAGULANTS: ICD-10-CM

## 2018-04-19 PROCEDURE — 85610 PROTHROMBIN TIME: CPT | Performed by: FAMILY MEDICINE

## 2018-04-19 PROCEDURE — 93793 ANTICOAG MGMT PT WARFARIN: CPT | Performed by: FAMILY MEDICINE

## 2018-04-19 NOTE — PROGRESS NOTES
Here for INR check in coumadin clinic in the office.     CURRENT COUMADIN DOSAGE:   6mg M,W,F and 7mg daily the rest of the week    COMPLAINTS/CHANGES:  No changes    INR RESULTS TODAY:   3.0 ( in goal)    PLAN:   CPM coumadin   Okay to increase vitamin K r

## 2018-04-25 RX ORDER — WARFARIN SODIUM 1 MG/1
TABLET ORAL
Qty: 48 TABLET | Refills: 0 | Status: SHIPPED | OUTPATIENT
Start: 2018-04-25 | End: 2018-06-15

## 2018-04-25 RX ORDER — WARFARIN SODIUM 6 MG/1
6 TABLET ORAL DAILY
COMMUNITY
End: 2018-05-16

## 2018-04-25 NOTE — TELEPHONE ENCOUNTER
Future appt:     Your appointments     Date & Time Appointment Department [facility])    Apr 27, 2018  3:30 PM CDT Follow up - Extended with Curly De Santiago MD [address] [facility] [place], [place]ight[place] ([facility])    May 03, 2018 10:

## 2018-04-27 ENCOUNTER — OFFICE VISIT (OUTPATIENT)
Dept: FAMILY MEDICINE CLINIC | Facility: CLINIC | Age: 78
End: 2018-04-27

## 2018-04-27 ENCOUNTER — HOSPITAL ENCOUNTER (OUTPATIENT)
Dept: GENERAL RADIOLOGY | Age: 78
Discharge: HOME | End: 2018-04-27
Attending: FAMILY MEDICINE
Payer: MEDICARE

## 2018-04-27 ENCOUNTER — ANTI-COAG VISIT (OUTPATIENT)
Dept: FAMILY MEDICINE CLINIC | Facility: CLINIC | Age: 78
End: 2018-04-27

## 2018-04-27 VITALS
TEMPERATURE: 99 F | HEART RATE: 92 BPM | DIASTOLIC BLOOD PRESSURE: 78 MMHG | SYSTOLIC BLOOD PRESSURE: 126 MMHG | OXYGEN SATURATION: 97 % | RESPIRATION RATE: 16 BRPM | HEIGHT: 61 IN | WEIGHT: 222 LBS | BODY MASS INDEX: 41.91 KG/M2

## 2018-04-27 DIAGNOSIS — J18.9 PNEUMONIA OF BOTH LUNGS DUE TO INFECTIOUS ORGANISM, UNSPECIFIED PART OF LUNG: ICD-10-CM

## 2018-04-27 DIAGNOSIS — R07.81 RIB PAIN: ICD-10-CM

## 2018-04-27 DIAGNOSIS — Z79.01 LONG TERM (CURRENT) USE OF ANTICOAGULANTS: ICD-10-CM

## 2018-04-27 DIAGNOSIS — I10 ESSENTIAL HYPERTENSION, BENIGN: ICD-10-CM

## 2018-04-27 DIAGNOSIS — I21.4 NSTEMI (NON-ST ELEVATED MYOCARDIAL INFARCTION) (HCC): ICD-10-CM

## 2018-04-27 DIAGNOSIS — I48.21 PERMANENT ATRIAL FIBRILLATION (HCC): ICD-10-CM

## 2018-04-27 DIAGNOSIS — S29.011D INTERCOSTAL MUSCLE STRAIN, SUBSEQUENT ENCOUNTER: Primary | ICD-10-CM

## 2018-04-27 PROCEDURE — 71046 X-RAY EXAM CHEST 2 VIEWS: CPT | Performed by: FAMILY MEDICINE

## 2018-04-27 PROCEDURE — 85610 PROTHROMBIN TIME: CPT | Performed by: FAMILY MEDICINE

## 2018-04-27 PROCEDURE — 99214 OFFICE O/P EST MOD 30 MIN: CPT | Performed by: FAMILY MEDICINE

## 2018-04-27 NOTE — PROGRESS NOTES
INR checked at office visit with provider. CURRENT COUMADIN DOSAGE:   6mg M,W,F and 7mg daily the rest of the week     COMPLAINTS/CHANGES:  Bruising on back and side, and left arm. Patient does not recall injury.     INR RESULTS TODAY:   2.8 ( in goal)

## 2018-04-27 NOTE — PROGRESS NOTES
[address]  PROGRESS NOTE  Chief Complaint:   Patient presents with: Follow - Up: Still having lower back and stomach pain      HPI:   This is a 69year old female coming in for recheck pf back pain.  Pt seen by Elly Yoo and Dr. Deysi Ibanez Smoking status: Former Smoker                                                                Packs/day: 0.00      Years: 0.00           Quit date: 3/15/1971    Smokeless tobacco: Never Used                        Alcohol use: Yes           0.0 oz/week Disp:  Rfl:    LOVASTATIN 20 MG Oral Tab TAKE ONE TABLET BY MOUTH EVERY DAY Disp: 90 tablet Rfl: 1   Hydrocortisone 2.5 % External Lotion Apply 1 Application topically 2 (two) times daily.  Apply sparingly to affected area twice a day as needed Disp: 59 mL Patient is alert, awake and oriented, well developed, well nourished, no apparent distress.   HEENT:  Head:  Normocephalic, atraumatic Eyes: EOMI, PERRLA, no scleral icterus, conjunctivae clear bilaterally, no eye discharge Ears: External normal. Nose: dominguez [facility])  Patient continues to follow with cardiology with planned cardiac rehab soon    5.  Essential hypertension, benign  Blood pressure stable today      Problem List:  Patient Active Problem List:     Long term (current) use of anticoagulants [Z79.01] today.

## 2018-04-27 NOTE — PATIENT INSTRUCTIONS
CXR today- reassuring    INR 2.8-  Better    Monitor   Tylenol for pain    If not better 2 weeks- then CT scan of chest     Encourage pt taking deep breaths-- use incentive spirometer- 2-4 x a day- every day -- do 6-10 reps each time

## 2018-05-03 ENCOUNTER — ANTI-COAG VISIT (OUTPATIENT)
Dept: FAMILY MEDICINE CLINIC | Facility: CLINIC | Age: 78
End: 2018-05-03

## 2018-05-03 DIAGNOSIS — Z79.01 LONG TERM (CURRENT) USE OF ANTICOAGULANTS: ICD-10-CM

## 2018-05-03 DIAGNOSIS — I48.21 PERMANENT ATRIAL FIBRILLATION (HCC): ICD-10-CM

## 2018-05-03 PROCEDURE — 85610 PROTHROMBIN TIME: CPT | Performed by: FAMILY MEDICINE

## 2018-05-03 PROCEDURE — 93793 ANTICOAG MGMT PT WARFARIN: CPT | Performed by: FAMILY MEDICINE

## 2018-05-03 NOTE — PATIENT INSTRUCTIONS
INR is in goal , but at upper end of goal.   With nose bleed, decrease coumadin dose to 7mg M,W,F and 6mg daily the rest of the week. Increase leafy greens by one serving weekly. Next INR in one week.

## 2018-05-03 NOTE — PROGRESS NOTES
Here for INR check in coumadin clinic in the office. CURRENT COUMADIN DOSAGE:   6mg Tu,th,Sa and 7mg daily the rest of the week    COMPLAINTS/CHANGES:  Nose bleed x1 this past week.      INR RESULTS TODAY:   3.0 ( in goal, but at upper end of goal)    PL

## 2018-05-07 ENCOUNTER — TELEPHONE (OUTPATIENT)
Dept: FAMILY MEDICINE CLINIC | Facility: CLINIC | Age: 78
End: 2018-05-07

## 2018-05-07 DIAGNOSIS — R07.89 CHEST WALL PAIN: Primary | ICD-10-CM

## 2018-05-07 NOTE — TELEPHONE ENCOUNTER
Pt confirmed- to have CT done at [facility]-  No auth needed per health plan. order faxed to [facility].

## 2018-05-07 NOTE — TELEPHONE ENCOUNTER
Pt was seen on 4/27 for intercostal muscle strain. Pt was told call back if s/s continued. Pt has ongoing pain- worse on L side \"feels like under the lungs\"  Pt states she doesn't know how to explain pain s/s.   Pt is not any more short of breath then u

## 2018-05-07 NOTE — TELEPHONE ENCOUNTER
CT of the chest that was just sent to them in not valid- it must be either with or without contrast, not both.  please correct and fax to [phone]

## 2018-05-08 ENCOUNTER — TELEPHONE (OUTPATIENT)
Dept: FAMILY MEDICINE CLINIC | Facility: CLINIC | Age: 78
End: 2018-05-08

## 2018-05-08 DIAGNOSIS — R07.9 CHEST PAIN, UNSPECIFIED TYPE: Primary | ICD-10-CM

## 2018-05-08 NOTE — TELEPHONE ENCOUNTER
Pt is scheduled for CT of chest on 5/11-  Surekha from Pt scheduling is requesting lab order to check kidney function,  Last chem panel checked last 4/6/18- needs to be more recent.

## 2018-05-10 ENCOUNTER — TELEPHONE (OUTPATIENT)
Dept: FAMILY MEDICINE CLINIC | Facility: CLINIC | Age: 78
End: 2018-05-10

## 2018-05-10 ENCOUNTER — ANTI-COAG VISIT (OUTPATIENT)
Dept: FAMILY MEDICINE CLINIC | Facility: CLINIC | Age: 78
End: 2018-05-10

## 2018-05-10 DIAGNOSIS — Z79.01 LONG TERM (CURRENT) USE OF ANTICOAGULANTS: ICD-10-CM

## 2018-05-10 DIAGNOSIS — I48.21 PERMANENT ATRIAL FIBRILLATION (HCC): ICD-10-CM

## 2018-05-10 PROCEDURE — 93793 ANTICOAG MGMT PT WARFARIN: CPT | Performed by: FAMILY MEDICINE

## 2018-05-10 PROCEDURE — 85610 PROTHROMBIN TIME: CPT | Performed by: FAMILY MEDICINE

## 2018-05-10 RX ORDER — ACETAMINOPHEN AND CODEINE PHOSPHATE 300; 30 MG/1; MG/1
1 TABLET ORAL EVERY 4 HOURS PRN
Qty: 30 TABLET | Refills: 0 | Status: SHIPPED | OUTPATIENT
Start: 2018-05-10 | End: 2018-06-13 | Stop reason: SINTOL

## 2018-05-10 NOTE — TELEPHONE ENCOUNTER
Please call patient and let her know that her CT scan of her chest shows that she has a compression fracture at T9. She in a lot of pain?   Which is like a prescription with Tylenol with codeine?  –Dr. Pauly Bailey will be back in the office tomorrow to r

## 2018-05-10 NOTE — TELEPHONE ENCOUNTER
Patient informed of the below results. States she does have quite a bit of pain at night and would like the Tylenol with Codeine that is being offered. Script can be sent to [address] [place] [address]. Please advise.

## 2018-05-10 NOTE — TELEPHONE ENCOUNTER
Rudy Newell from [facility] radiology calling with call report on CT done today. Report was received by fax. Given to Alta Gongora NP. Also lab results from [facility] given to [place].

## 2018-05-11 NOTE — TELEPHONE ENCOUNTER
CT scan results reviewed. Patient recommended to schedule follow-up appointment to review results and clinical status in the next 1-2 weeks.     Future Appointments  Date Time Provider Cora Palafox   5/24/2018 10:30 AM [facility] COUMADIN NURSE [facility] [place]

## 2018-05-16 RX ORDER — LISINOPRIL 5 MG/1
5 TABLET ORAL DAILY
Qty: 90 TABLET | Refills: 0 | Status: SHIPPED | OUTPATIENT
Start: 2018-05-16 | End: 2018-07-31

## 2018-05-16 RX ORDER — WARFARIN SODIUM 6 MG/1
6 TABLET ORAL DAILY
Qty: 90 TABLET | Refills: 0 | Status: SHIPPED | OUTPATIENT
Start: 2018-05-16 | End: 2018-07-31

## 2018-05-16 RX ORDER — CLOPIDOGREL BISULFATE 75 MG/1
75 TABLET ORAL DAILY
Qty: 90 TABLET | Refills: 0 | Status: SHIPPED | OUTPATIENT
Start: 2018-05-16 | End: 2018-07-31

## 2018-05-16 NOTE — TELEPHONE ENCOUNTER
Future appt:     Your appointments     Date & Time Appointment Department [facility])    May 24, 2018 10:30 AM CDT Anti-Coag with [facility] [place] [address] [place] [address],[address], [place] ([place])        [address] [place] [address]

## 2018-05-17 ENCOUNTER — TELEPHONE (OUTPATIENT)
Dept: FAMILY MEDICINE CLINIC | Facility: CLINIC | Age: 78
End: 2018-05-17

## 2018-05-17 NOTE — TELEPHONE ENCOUNTER
Taking alprazolam for MRI today. Okay with coumadin? Yes, take alprazolam as prescribed and keep INR appt next week as scheduled.

## 2018-05-18 ENCOUNTER — TELEPHONE (OUTPATIENT)
Dept: FAMILY MEDICINE CLINIC | Facility: CLINIC | Age: 78
End: 2018-05-18

## 2018-05-18 NOTE — TELEPHONE ENCOUNTER
Performed a thorasic MRI results suspected METS of the spin. All papers were faxed over few minutes ago. Any questions you call.

## 2018-05-18 NOTE — TELEPHONE ENCOUNTER
States that Dr Radha Costa wants pt to talk with Dr Parminder Trujillo first before pt sees oncoloist.

## 2018-05-18 NOTE — TELEPHONE ENCOUNTER
Pt walked in- states she had her appt with Dr. Manuel Kessler today. Had MRI completed yesterday, pt states she needs referral to oncology,  Left message for dArianna Melgar- works with Dr. Manuel Kessler- awaiting call back.

## 2018-05-18 NOTE — TELEPHONE ENCOUNTER
Spoke with Christin Salmeron from [facility],  States that currently they are scheduling pt for kypoplasty of T9 region   At which point pt will have bone biopsy taken at [facility] with interventional radiology. Appt pending.   Explained to Christin Salmeron that pt walked in to request re

## 2018-05-18 NOTE — TELEPHONE ENCOUNTER
Mendez Kinney called back, states that Dr. Olamide Francis typically does not refer to oncology. Recommends that pt follow up w/ PCP. Dr. Olamide Francis wanted CR to be of current plan of care. Mendez Kinney will be calling pt today as well- they will forward any results.

## 2018-05-19 ENCOUNTER — TELEPHONE (OUTPATIENT)
Dept: FAMILY MEDICINE CLINIC | Facility: CLINIC | Age: 78
End: 2018-05-19

## 2018-05-19 NOTE — TELEPHONE ENCOUNTER
Patient called to inform us she needs a Biopsy of her spine and then possibly cement her spinal vertebrae. Patient will meet with surgeon on Tuesday 5/22/18 for consult.    Patient will contact us and Dr. Clinton Multani, cardiologist, after consult to inform of da

## 2018-05-24 ENCOUNTER — ANTI-COAG VISIT (OUTPATIENT)
Dept: FAMILY MEDICINE CLINIC | Facility: CLINIC | Age: 78
End: 2018-05-24

## 2018-05-24 DIAGNOSIS — Z79.01 LONG TERM (CURRENT) USE OF ANTICOAGULANTS: ICD-10-CM

## 2018-05-24 DIAGNOSIS — I48.21 PERMANENT ATRIAL FIBRILLATION (HCC): ICD-10-CM

## 2018-05-24 PROCEDURE — 93793 ANTICOAG MGMT PT WARFARIN: CPT | Performed by: FAMILY MEDICINE

## 2018-05-24 PROCEDURE — 85610 PROTHROMBIN TIME: CPT | Performed by: FAMILY MEDICINE

## 2018-05-24 NOTE — PROGRESS NOTES
Here for INR check in coumadin clinic in the office. CURRENT COUMADIN DOSAGE:   7mg M,W,F and 6mg daily the rest of the week     COMPLAINTS/CHANGES:  Back procedure planned. Not scheduled.    Will discuss mayuri-operative anticoagulation with Dr. Fernando Barton, ca

## 2018-05-30 ENCOUNTER — TELEPHONE (OUTPATIENT)
Dept: FAMILY MEDICINE CLINIC | Facility: CLINIC | Age: 78
End: 2018-05-30

## 2018-05-30 NOTE — TELEPHONE ENCOUNTER
Message left for patient to return call regarding upcoming kyphoplasty and Dr. Lupe Morales instructions for lovenox bridge.

## 2018-05-30 NOTE — TELEPHONE ENCOUNTER
Pt states she was given instructions per Dr. Jerad Garcia for her Coumadin/Lovenox prior to upcoming kyphoplasty scheduled on 6/8. Report reviewed per CR. Pt instructed to verify that Dr. Hussein Lauren instructions were sent to Dr. Eileen Vasquez office.   Joao Godinez. (coumadin

## 2018-05-31 ENCOUNTER — TELEPHONE (OUTPATIENT)
Dept: FAMILY MEDICINE CLINIC | Facility: CLINIC | Age: 78
End: 2018-05-31

## 2018-05-31 NOTE — TELEPHONE ENCOUNTER
Kyphoplasty by Dr. Angel Willett on 6/8/18    Orders from Dr. Candance Dance are to suspend warfarin for 5 days prior to procedure. Her last dose of coumadin will be Saturday 6/2/18 and she will hold coumadin starting Mj 6/3/18. INR scheduled for Monday.      Orders

## 2018-06-05 ENCOUNTER — ANTI-COAG VISIT (OUTPATIENT)
Dept: FAMILY MEDICINE CLINIC | Facility: CLINIC | Age: 78
End: 2018-06-05

## 2018-06-05 ENCOUNTER — TELEPHONE (OUTPATIENT)
Dept: FAMILY MEDICINE CLINIC | Facility: CLINIC | Age: 78
End: 2018-06-05

## 2018-06-05 DIAGNOSIS — Z79.01 LONG TERM (CURRENT) USE OF ANTICOAGULANTS: ICD-10-CM

## 2018-06-05 DIAGNOSIS — I48.21 PERMANENT ATRIAL FIBRILLATION (HCC): ICD-10-CM

## 2018-06-05 PROCEDURE — 93793 ANTICOAG MGMT PT WARFARIN: CPT | Performed by: FAMILY MEDICINE

## 2018-06-05 PROCEDURE — 85610 PROTHROMBIN TIME: CPT | Performed by: FAMILY MEDICINE

## 2018-06-05 RX ORDER — ENOXAPARIN SODIUM 100 MG/ML
1 INJECTION SUBCUTANEOUS 2 TIMES DAILY
Qty: 6 SYRINGE | Refills: 1 | Status: SHIPPED | OUTPATIENT
Start: 2018-06-05 | End: 2018-06-13

## 2018-06-05 NOTE — TELEPHONE ENCOUNTER
I had allready added a refill to her lovenox Rx incase she needed more- so this should not be a problem

## 2018-06-05 NOTE — TELEPHONE ENCOUNTER
When patient was in the office she stated her lovenox that she had at home was not . She thinks she looked at the wrong date. She states the syringes states expiration of 18. She already took an injection this morning.   # 6 syringes more we

## 2018-06-05 NOTE — TELEPHONE ENCOUNTER
Pt wanted to let Jesus Artist know that Lovenox medication has . Needs new prescription for the rest of the week. Pt states that she took one this morning.

## 2018-06-05 NOTE — PROGRESS NOTES
Here for INR check in coumadin clinic in the office.     CURRENT COUMADIN DOSAGE:   HOLDING past two days for kyphoplasty on 6/8/17 by Dr. Angel Willett    COMPLAINTS/CHANGES:  Per Dr. Amie Herrera instructions patient to start lovenox when INR is below 2.0    INR RESULT

## 2018-06-05 NOTE — TELEPHONE ENCOUNTER
Patient has held coumadin since Sunday as directed by Dr. Ben Marin for Kyphoplasty on Friday 6/8/18. She is to start lovenox 1mg/kg q12hrs.  Patient to hold lovenox 24hrs before procedure and resume 24hrs after,if okay with Dr. Ras Estrella ( proceduralist)  Patient

## 2018-06-05 NOTE — PATIENT INSTRUCTIONS
INR is coming down for surgery. Continue to hold coumadin. Start lovenox today. One injection in fatty area of abdomen every 12 hrs. Avoid 2 inch area around belly button with injection.      After procedure, when ok with Dr. Chelsi Snider, resume coumadin at yo

## 2018-06-11 ENCOUNTER — ANTI-COAG VISIT (OUTPATIENT)
Dept: FAMILY MEDICINE CLINIC | Facility: CLINIC | Age: 78
End: 2018-06-11

## 2018-06-11 DIAGNOSIS — Z79.01 LONG TERM (CURRENT) USE OF ANTICOAGULANTS: ICD-10-CM

## 2018-06-11 DIAGNOSIS — I48.21 PERMANENT ATRIAL FIBRILLATION (HCC): ICD-10-CM

## 2018-06-11 PROCEDURE — 93793 ANTICOAG MGMT PT WARFARIN: CPT | Performed by: FAMILY MEDICINE

## 2018-06-11 PROCEDURE — 85610 PROTHROMBIN TIME: CPT | Performed by: FAMILY MEDICINE

## 2018-06-11 NOTE — PROGRESS NOTES
Here for INR check in coumadin clinic in the office.     CURRENT COUMADIN DOSAGE:   7mg M,W,F and 6mg daily the rest of the week    COMPLAINTS/CHANGES:  Kyphoplasty 3 days ago  On lovenox bridge  Resumed coumadin 3 days ago     INR RESULTS TODAY:   1.2 ( be

## 2018-06-11 NOTE — PATIENT INSTRUCTIONS
INR still low at 1.2. This is normal after holding coumadin. Increase coumadin to 7mg daily   Continue lovenox injections.      Next INR on Wednesday at 2:30pm.

## 2018-06-13 ENCOUNTER — TELEPHONE (OUTPATIENT)
Dept: FAMILY MEDICINE CLINIC | Facility: CLINIC | Age: 78
End: 2018-06-13

## 2018-06-13 ENCOUNTER — ANTI-COAG VISIT (OUTPATIENT)
Dept: FAMILY MEDICINE CLINIC | Facility: CLINIC | Age: 78
End: 2018-06-13

## 2018-06-13 DIAGNOSIS — I48.21 PERMANENT ATRIAL FIBRILLATION (HCC): ICD-10-CM

## 2018-06-13 DIAGNOSIS — Z79.01 LONG TERM (CURRENT) USE OF ANTICOAGULANTS: ICD-10-CM

## 2018-06-13 PROCEDURE — 93793 ANTICOAG MGMT PT WARFARIN: CPT | Performed by: FAMILY MEDICINE

## 2018-06-13 PROCEDURE — 85610 PROTHROMBIN TIME: CPT | Performed by: FAMILY MEDICINE

## 2018-06-13 RX ORDER — OXYCODONE HYDROCHLORIDE AND ACETAMINOPHEN 5; 325 MG/1; MG/1
TABLET ORAL
COMMUNITY
Start: 2018-05-18 | End: 2018-06-23

## 2018-06-13 RX ORDER — ENOXAPARIN SODIUM 100 MG/ML
1 INJECTION SUBCUTANEOUS 2 TIMES DAILY
Qty: 6 SYRINGE | Refills: 1 | Status: SHIPPED | OUTPATIENT
Start: 2018-06-13 | End: 2018-06-16

## 2018-06-13 NOTE — TELEPHONE ENCOUNTER
Pt is taking Lovenox 100 mg 2 times per day. Pt has a dose for tonight and tomorrow morning, but then is out. Pt requesting refill. Has INR appt again on Friday morning- scheduled at 11am.  Pt was also needing lab scheduled- appts coordinated.   Pt is cu

## 2018-06-13 NOTE — TELEPHONE ENCOUNTER
Spoke to pharmacy- pharmacist states that pt picked up #6 of Lovenox on 6/5, and then picked up #3 on 6/9 and another #3 syringes on 6/11. Pt has appt for INR on Friday. Left message for pt.     Future Appointments  Date Time Provider Cora Palafox

## 2018-06-13 NOTE — TELEPHONE ENCOUNTER
Patient thinks she has a refill, pharmacy don't see anything. Please give them a call back.   There direct # [phone]

## 2018-06-13 NOTE — PROGRESS NOTES
Here for INR check in coumadin clinic in the office.     CURRENT COUMADIN DOSAGE:   7mg daily     COMPLAINTS/CHANGES:  Lovenox bridge  On oxycodone post kyphoplasty on 6/8/18  Resumed coumadin on 6/9/18    INR RESULTS TODAY:   1.3 ( below goal)    PLAN:   I

## 2018-06-15 ENCOUNTER — ANTI-COAG VISIT (OUTPATIENT)
Dept: FAMILY MEDICINE CLINIC | Facility: CLINIC | Age: 78
End: 2018-06-15

## 2018-06-15 ENCOUNTER — APPOINTMENT (OUTPATIENT)
Dept: LAB | Age: 78
End: 2018-06-15
Attending: FAMILY MEDICINE
Payer: MEDICARE

## 2018-06-15 ENCOUNTER — TELEPHONE (OUTPATIENT)
Dept: FAMILY MEDICINE CLINIC | Facility: CLINIC | Age: 78
End: 2018-06-15

## 2018-06-15 DIAGNOSIS — I48.21 PERMANENT ATRIAL FIBRILLATION (HCC): ICD-10-CM

## 2018-06-15 DIAGNOSIS — Z79.01 LONG TERM (CURRENT) USE OF ANTICOAGULANTS: ICD-10-CM

## 2018-06-15 DIAGNOSIS — R07.9 CHEST PAIN, UNSPECIFIED TYPE: ICD-10-CM

## 2018-06-15 PROCEDURE — 85610 PROTHROMBIN TIME: CPT | Performed by: FAMILY MEDICINE

## 2018-06-15 PROCEDURE — 80048 BASIC METABOLIC PNL TOTAL CA: CPT

## 2018-06-15 PROCEDURE — 36415 COLL VENOUS BLD VENIPUNCTURE: CPT

## 2018-06-15 RX ORDER — WARFARIN SODIUM 1 MG/1
TABLET ORAL
Qty: 48 TABLET | Refills: 0 | Status: CANCELLED | OUTPATIENT
Start: 2018-06-15

## 2018-06-15 RX ORDER — WARFARIN SODIUM 1 MG/1
TABLET ORAL
Qty: 48 TABLET | Refills: 0 | Status: SHIPPED | OUTPATIENT
Start: 2018-06-15 | End: 2018-06-27

## 2018-06-15 NOTE — PROGRESS NOTES
Continue Lovenox. Coumadin 9 mg today. Recheck with [place]. tomorrow.    HAMILTON Katz, FNP-BC  6/15/2018  11:18 AM

## 2018-06-15 NOTE — TELEPHONE ENCOUNTER
Future appt:     Your appointments     Date & Time Appointment Department [facility])    Jun 16, 2018  9:30 AM CDT Anti-Coag with Liset Lovelace ([place])    Jun 21, 2018 10:00 AM CDT Anti-

## 2018-06-15 NOTE — PROGRESS NOTES
Pt informed, instructions given. Appt scheduled for f/u tomorrow.     Future Appointments  Date Time Provider Cora Palafox   6/16/2018 9:30 AM [facility] [place] NURSE [facility] [place] [facility] [place]   6/21/2018 10:00 AM [facility] COUMADIN NURSE [facility] [place] [facility] [place]

## 2018-06-15 NOTE — TELEPHONE ENCOUNTER
Because of the fact that she has multiple allergies to narcotics, and she is taking warfarin, her options for pain medicines are very very limited.   My recommendation is that between now and Monday she continue with acetaminophen 650 mg every 4 hours as ne

## 2018-06-15 NOTE — PROGRESS NOTES
INR:  1.6  Pt is currently on Coumadin 9 mg daily, is taking Lovenox 100 mg BID. Pt is currently on Oxycodone 1-2 tabs daily due to her back pain. Pt is scheduled for cortisone injection per DR. Monae on 6/18.   Pt states depending on injection, she may

## 2018-06-15 NOTE — TELEPHONE ENCOUNTER
Pt w/ recent kyphoplasty on 6/8-  Dr. Rickie Garcia states she is not having any response from procedure. C/o of continued upper back pain. Pt was taking Oxycodone- states she is not able to take- due to itching and welts.   Added to allergy list.  [place]

## 2018-06-16 ENCOUNTER — ANTI-COAG VISIT (OUTPATIENT)
Dept: FAMILY MEDICINE CLINIC | Facility: CLINIC | Age: 78
End: 2018-06-16

## 2018-06-16 ENCOUNTER — TELEPHONE (OUTPATIENT)
Dept: FAMILY MEDICINE CLINIC | Facility: CLINIC | Age: 78
End: 2018-06-16

## 2018-06-16 DIAGNOSIS — Z79.01 LONG TERM (CURRENT) USE OF ANTICOAGULANTS: ICD-10-CM

## 2018-06-16 DIAGNOSIS — I48.21 PERMANENT ATRIAL FIBRILLATION (HCC): ICD-10-CM

## 2018-06-16 PROCEDURE — 85610 PROTHROMBIN TIME: CPT | Performed by: FAMILY MEDICINE

## 2018-06-16 PROCEDURE — 93793 ANTICOAG MGMT PT WARFARIN: CPT | Performed by: FAMILY MEDICINE

## 2018-06-16 RX ORDER — ENOXAPARIN SODIUM 100 MG/ML
1 INJECTION SUBCUTANEOUS 2 TIMES DAILY
Qty: 6 SYRINGE | Refills: 1 | Status: SHIPPED | OUTPATIENT
Start: 2018-06-16 | End: 2018-06-23

## 2018-06-16 NOTE — PROGRESS NOTES
Here for INR check in coumadin clinic in the office. CURRENT COUMADIN DOSAGE:   9mg daily ( usual dose is 7mg M,W,F and 6mg daily the rest of the week)    COMPLAINTS/CHANGES:  Kyphoplasty unsuccessful  Had to discontinue Oxycodone due to hives.  ( see ph

## 2018-06-16 NOTE — TELEPHONE ENCOUNTER
----- Message from ZAHRAA Savage sent at 6/16/2018 10:14 AM CDT -----  Please notify patient that her blood work is essentially normal–her kidney function is just slightly increased–stable from previous. Recommend increasing fluids.

## 2018-06-16 NOTE — PATIENT INSTRUCTIONS
INR stable but still low at 1.6  Continue coumadin 9mg today   Tomorrow take coumadin 7mg Sunday. Continue lovenox  INR on Monday.

## 2018-06-16 NOTE — TELEPHONE ENCOUNTER
INR still subtherapeutic today at 1.6 after holding coumadin for Kyphoplasty  Needs refill of Lovenox. INR again on Monday. Please send script to [facility]. Taking twice daily. Script pended.

## 2018-06-18 ENCOUNTER — ANTI-COAG VISIT (OUTPATIENT)
Dept: FAMILY MEDICINE CLINIC | Facility: CLINIC | Age: 78
End: 2018-06-18

## 2018-06-18 ENCOUNTER — TELEPHONE (OUTPATIENT)
Dept: FAMILY MEDICINE CLINIC | Facility: CLINIC | Age: 78
End: 2018-06-18

## 2018-06-18 DIAGNOSIS — Z79.01 LONG TERM (CURRENT) USE OF ANTICOAGULANTS: ICD-10-CM

## 2018-06-18 DIAGNOSIS — I48.21 PERMANENT ATRIAL FIBRILLATION (HCC): ICD-10-CM

## 2018-06-18 PROCEDURE — 85610 PROTHROMBIN TIME: CPT | Performed by: FAMILY MEDICINE

## 2018-06-18 PROCEDURE — 93793 ANTICOAG MGMT PT WARFARIN: CPT | Performed by: FAMILY MEDICINE

## 2018-06-18 NOTE — TELEPHONE ENCOUNTER
Update:  Nurse called from Dr. Figueroa Comfort office. Pt was scheduled today for intercostal nerve block (scheduled for 4).   Nurse states that Dr. Kwame Lezama was only able to complete 1-   pt started to not feel well during the procedure, A-Fib  Mentioned that pt's

## 2018-06-18 NOTE — PROGRESS NOTES
Here for INR check in coumadin clinic in the office.     CURRENT COUMADIN DOSAGE:   9mg daily     COMPLAINTS/CHANGES:  Cortisone injection today by Dr. Caresse Hodgkin in back  Took wrong dose on Sunday: was to take 7mg, took 9mg    INR RESULTS TODAY:   1.7 ( below

## 2018-06-20 ENCOUNTER — TELEPHONE (OUTPATIENT)
Dept: FAMILY MEDICINE CLINIC | Facility: CLINIC | Age: 78
End: 2018-06-20

## 2018-06-20 NOTE — TELEPHONE ENCOUNTER
Calling to cancel INR appointment because she is in the hospital again.    She states she \"fainted after cortisone injection on Monday and I was sent to hospital and admitted for some time of infection\"  This phone encounter routed to Veena Jorge and Dr. Sherryle Sergeant

## 2018-06-23 ENCOUNTER — OFFICE VISIT (OUTPATIENT)
Dept: FAMILY MEDICINE CLINIC | Facility: CLINIC | Age: 78
End: 2018-06-23

## 2018-06-23 VITALS
SYSTOLIC BLOOD PRESSURE: 108 MMHG | HEART RATE: 84 BPM | BODY MASS INDEX: 41.54 KG/M2 | RESPIRATION RATE: 20 BRPM | DIASTOLIC BLOOD PRESSURE: 68 MMHG | HEIGHT: 61 IN | WEIGHT: 220 LBS | TEMPERATURE: 98 F

## 2018-06-23 DIAGNOSIS — B99.9 INFECTION: ICD-10-CM

## 2018-06-23 DIAGNOSIS — I25.10 ATHEROSCLEROSIS OF NATIVE CORONARY ARTERY OF NATIVE HEART WITHOUT ANGINA PECTORIS: Primary | ICD-10-CM

## 2018-06-23 DIAGNOSIS — I48.21 PERMANENT ATRIAL FIBRILLATION (HCC): ICD-10-CM

## 2018-06-23 DIAGNOSIS — G47.33 OBSTRUCTIVE SLEEP APNEA: ICD-10-CM

## 2018-06-23 DIAGNOSIS — I10 ESSENTIAL HYPERTENSION, BENIGN: ICD-10-CM

## 2018-06-23 PROBLEM — R55 VASOVAGAL NEAR SYNCOPE: Status: ACTIVE | Noted: 2018-06-18

## 2018-06-23 PROCEDURE — 1111F DSCHRG MED/CURRENT MED MERGE: CPT | Performed by: FAMILY MEDICINE

## 2018-06-23 PROCEDURE — 99214 OFFICE O/P EST MOD 30 MIN: CPT | Performed by: FAMILY MEDICINE

## 2018-06-23 RX ORDER — DOXYCYCLINE 100 MG/1
100 CAPSULE ORAL DAILY
COMMUNITY
Start: 2018-06-22 | End: 2018-07-14 | Stop reason: ALTCHOICE

## 2018-06-23 NOTE — PROGRESS NOTES
[facility] [place]  PROGRESS NOTE  Chief Complaint:   Patient presents with:  Hospital F/U: 6/18-6/22       HPI:   This is a 69year old female coming in for post hospital f.u- discharged yesterday- see discharge report-below.     Pt had kyphopla Social History Main Topics    Smoking status: Former Smoker                                                                Packs/day: 0.00      Years: 0.00           Quit date: 3/15/1971    Smokeless tobacco: Never Used Spacer/Aero-Holding Chambers (AEROCHAMBER MINI CHAMBER) Does not apply Device Use as directed with proair Disp: 1 Device Rfl: 0   Pantoprazole Sodium 40 MG Oral Tab EC Take 40 mg by mouth daily.  Disp:  Rfl:    Cholecalciferol (VITAMIN D) 2000 units Oral 20   Ht 61\"   Wt 220 lb   BMI 41.57 kg/m²  Estimated body mass index is 41.57 kg/m² as calculated from the following:    Height as of this encounter: 61\". Weight as of this encounter: 220 lb. Vital signs reviewed. Appears stated age, well groomed.   P Diaphragmatic hernia     Hypertension     Familial multiple lipoprotein-type hyperlipidemia     Obstructive sleep apnea     Osteoarthrosis     Periodic limb movement disorder     Rectocele     Other psoriasis     Osteopenia of spine     Facial basal cell c

## 2018-06-23 NOTE — PROGRESS NOTES
Javier Solorzano MD - 06/22/2018 9:33 AM CDT  Formatting of this note may be different from the original.      Primary Care Physician: Quirino Goodman MD    Date of Admission: 6/18/2018    Date of Discharge: 6/22/2018    Primary Diagnosis:  Acute feb to, CRP of 13.4. Due to her underlying inflammatory state infectious disease recommended starting on broad-spectrum antibiotic including azithromycin and Zosyn. Over the course next few days, her white count improved, pro-calcitonin trended down.  There was (apply to ears and between legs). Apply to affected area     lisinopril 5 mg tablet  Take 1 tablet by mouth daily. lovastatin 20 mg tablet  Take 1 tablet by mouth every evening before dinner.     metoprolol succinate 50 mg XL tablet  Take 1 tablet by mayra

## 2018-06-23 NOTE — TELEPHONE ENCOUNTER
Reviewed    Future Appointments  Date Time Provider Cora Palafox   6/23/2018 10:00 AM Sarah Buckley MD [facility] [place] [facility] [place]   6/25/2018 11:00 AM [facility] COUMADIN NURSE [facility] [place] [facility] [place]

## 2018-06-23 NOTE — PATIENT INSTRUCTIONS
Pt to continue meds    INR 6/ 25    Pt to f.u with cardiology-- pt to hold on cardiac rehab til off antbiotics and cleared by cardiology    Recheck with me 2 weeks-- plan for labs then ( cbc and chem panel)

## 2018-06-25 ENCOUNTER — TELEPHONE (OUTPATIENT)
Dept: FAMILY MEDICINE CLINIC | Facility: CLINIC | Age: 78
End: 2018-06-25

## 2018-06-25 ENCOUNTER — ANTI-COAG VISIT (OUTPATIENT)
Dept: FAMILY MEDICINE CLINIC | Facility: CLINIC | Age: 78
End: 2018-06-25

## 2018-06-25 DIAGNOSIS — R04.0 BLEEDING FROM THE NOSE: Primary | ICD-10-CM

## 2018-06-25 DIAGNOSIS — R53.83 FATIGUE, UNSPECIFIED TYPE: ICD-10-CM

## 2018-06-25 DIAGNOSIS — Z79.01 LONG TERM (CURRENT) USE OF ANTICOAGULANTS: ICD-10-CM

## 2018-06-25 DIAGNOSIS — I48.21 PERMANENT ATRIAL FIBRILLATION (HCC): ICD-10-CM

## 2018-06-25 PROCEDURE — 85610 PROTHROMBIN TIME: CPT | Performed by: FAMILY MEDICINE

## 2018-06-25 PROCEDURE — 93793 ANTICOAG MGMT PT WARFARIN: CPT | Performed by: FAMILY MEDICINE

## 2018-06-25 PROCEDURE — 85018 HEMOGLOBIN: CPT | Performed by: FAMILY MEDICINE

## 2018-06-25 NOTE — PROGRESS NOTES
Here for INR check in coumadin clinic in the office.     CURRENT COUMADIN DOSAGE:   9mg daily ( higher than usual dosage)    COMPLAINTS/CHANGES:  Admit, on doxycycline  Lovenox discontinued in hospital ( had held coumadin for spinal injection)  Patient comp

## 2018-06-25 NOTE — PATIENT INSTRUCTIONS
INR is rising. Decrease coumadin to 7mg daily   Next INR on Thursday    Watch for bleeding such as black tarry stool, bloody stool, blood in urine, unusual bruising or significant nose bleeds. Please call if these symptoms occur.    If you start a new med

## 2018-06-27 ENCOUNTER — TELEPHONE (OUTPATIENT)
Dept: FAMILY MEDICINE CLINIC | Facility: CLINIC | Age: 78
End: 2018-06-27

## 2018-06-27 RX ORDER — WARFARIN SODIUM 1 MG/1
TABLET ORAL
Qty: 90 TABLET | Refills: 1 | Status: SHIPPED | OUTPATIENT
Start: 2018-06-27 | End: 2018-08-14 | Stop reason: DRUGHIGH

## 2018-06-27 NOTE — TELEPHONE ENCOUNTER
Future appt:     Your appointments     Date & Time Appointment Department [facility])    Jun 28, 2018  1:45 PM CDT Anti-Coag with [facility] [address], [place] ([place])    Jul 14, 2018 10:00 AM CDT Jean-Paul Brooke

## 2018-06-28 ENCOUNTER — ANTI-COAG VISIT (OUTPATIENT)
Dept: FAMILY MEDICINE CLINIC | Facility: CLINIC | Age: 78
End: 2018-06-28

## 2018-06-28 DIAGNOSIS — I48.21 PERMANENT ATRIAL FIBRILLATION (HCC): ICD-10-CM

## 2018-06-28 DIAGNOSIS — Z79.01 LONG TERM (CURRENT) USE OF ANTICOAGULANTS: ICD-10-CM

## 2018-06-28 PROCEDURE — 93793 ANTICOAG MGMT PT WARFARIN: CPT | Performed by: FAMILY MEDICINE

## 2018-06-28 PROCEDURE — 85610 PROTHROMBIN TIME: CPT | Performed by: FAMILY MEDICINE

## 2018-06-28 NOTE — PROGRESS NOTES
Here for INR check in coumadin clinic in the office.     CURRENT COUMADIN DOSAGE:   7mg daily     COMPLAINTS/CHANGES:  Doxycycline for 3 more day    INR RESULTS TODAY:   2.7 ( INR in goal and trending down)     PLAN:   CPM coumadin   Next INR in one week  A

## 2018-07-02 NOTE — TELEPHONE ENCOUNTER
Future appt:     Your appointments     Date & Time Appointment Department [facility])    Jul 05, 2018  2:00 PM CDT Anti-Coag with [facility] [address], [place] ([place])    Jul 14, 2018 10:00 AM CDT Qiana Miguel

## 2018-07-03 ENCOUNTER — TELEPHONE (OUTPATIENT)
Dept: FAMILY MEDICINE CLINIC | Facility: CLINIC | Age: 78
End: 2018-07-03

## 2018-07-03 RX ORDER — POTASSIUM CHLORIDE 750 MG/1
10 TABLET, EXTENDED RELEASE ORAL DAILY
Qty: 90 TABLET | Refills: 0 | Status: SHIPPED | OUTPATIENT
Start: 2018-07-03 | End: 2018-09-28

## 2018-07-03 RX ORDER — METOPROLOL SUCCINATE 50 MG/1
50 TABLET, EXTENDED RELEASE ORAL 2 TIMES DAILY
Qty: 180 TABLET | Refills: 0 | Status: SHIPPED | OUTPATIENT
Start: 2018-07-03 | End: 2018-09-28

## 2018-07-03 NOTE — TELEPHONE ENCOUNTER
Melatonin is a non-FDA approved supplement–it is difficult for me to make a recommendation because of this. I would recommend that patient talk with her pharmacist to review her medications to see if there are any known interactions.   Overall, I would not

## 2018-07-03 NOTE — TELEPHONE ENCOUNTER
Pt last seen on 6/23, pt is asking if she can take Melatonin? States she is sleeping a couple hours at a time and some naps during the day. Pt is sleeping 4 hours max. Next appt not until 7/14, wonders if she can take OTC with her current med?     Pl

## 2018-07-05 ENCOUNTER — ANTI-COAG VISIT (OUTPATIENT)
Dept: FAMILY MEDICINE CLINIC | Facility: CLINIC | Age: 78
End: 2018-07-05

## 2018-07-05 DIAGNOSIS — I48.21 PERMANENT ATRIAL FIBRILLATION (HCC): ICD-10-CM

## 2018-07-05 DIAGNOSIS — Z79.01 LONG TERM (CURRENT) USE OF ANTICOAGULANTS: ICD-10-CM

## 2018-07-05 LAB — INR: 2.3 (ref 0.8–1.2)

## 2018-07-05 PROCEDURE — 85610 PROTHROMBIN TIME: CPT | Performed by: FAMILY MEDICINE

## 2018-07-05 PROCEDURE — 93793 ANTICOAG MGMT PT WARFARIN: CPT | Performed by: FAMILY MEDICINE

## 2018-07-05 NOTE — PROGRESS NOTES
Here for INR check in coumadin clinic in the office. CURRENT COUMADIN DOSAGE:   7mg daily     COMPLAINTS/CHANGES:  Finished doxycycline    INR RESULTS TODAY:   2.3 ( in goal)    PLAN:   CPM coumadin   Next INR in two weeks. Appointment scheduled.

## 2018-07-11 RX ORDER — METOPROLOL SUCCINATE 50 MG/1
50 TABLET, EXTENDED RELEASE ORAL 2 TIMES DAILY
Qty: 180 TABLET | Refills: 0 | OUTPATIENT
Start: 2018-07-11

## 2018-07-11 RX ORDER — POTASSIUM CHLORIDE 750 MG/1
10 TABLET, EXTENDED RELEASE ORAL DAILY
Qty: 90 TABLET | Refills: 0 | OUTPATIENT
Start: 2018-07-11

## 2018-07-11 NOTE — TELEPHONE ENCOUNTER
Future appt:     Your appointments     Date & Time Appointment Department [facility])    Jul 14, 2018 10:00 AM CDT Follow up with Chente Hussein MD [address] [place] [address] ([facility])    Jul 19, 2018 10:30 AM CDT A

## 2018-07-14 ENCOUNTER — OFFICE VISIT (OUTPATIENT)
Dept: FAMILY MEDICINE CLINIC | Facility: CLINIC | Age: 78
End: 2018-07-14

## 2018-07-14 VITALS
WEIGHT: 226 LBS | RESPIRATION RATE: 22 BRPM | TEMPERATURE: 98 F | OXYGEN SATURATION: 96 % | DIASTOLIC BLOOD PRESSURE: 62 MMHG | BODY MASS INDEX: 42.67 KG/M2 | HEIGHT: 61 IN | SYSTOLIC BLOOD PRESSURE: 98 MMHG | HEART RATE: 72 BPM

## 2018-07-14 DIAGNOSIS — T14.8XXA HEMATOMA: ICD-10-CM

## 2018-07-14 DIAGNOSIS — I25.10 ATHEROSCLEROSIS OF NATIVE CORONARY ARTERY OF NATIVE HEART WITHOUT ANGINA PECTORIS: ICD-10-CM

## 2018-07-14 DIAGNOSIS — F41.9 ANXIETY: ICD-10-CM

## 2018-07-14 DIAGNOSIS — G47.9 SLEEP DISTURBANCE: ICD-10-CM

## 2018-07-14 DIAGNOSIS — I48.21 PERMANENT ATRIAL FIBRILLATION (HCC): ICD-10-CM

## 2018-07-14 DIAGNOSIS — I10 ESSENTIAL HYPERTENSION: Primary | ICD-10-CM

## 2018-07-14 PROCEDURE — 99215 OFFICE O/P EST HI 40 MIN: CPT | Performed by: FAMILY MEDICINE

## 2018-07-14 NOTE — PATIENT INSTRUCTIONS
Pt to have sleep eval on MOnday 6/ 17    Pt to contact Rocky souza ( at [facility])    Luis Loza to restart cardiac rehab after July 23    Consider treatment for anxiety- pending sleep eval

## 2018-07-14 NOTE — PROGRESS NOTES
[address]  PROGRESS NOTE  Chief Complaint:   Patient presents with: Follow - Up: hospital       HPI:   This is a 69year old female coming in for medical follow-up.   She was last seen June 23 just after being released from the hospital Packs/day: 0.00      Years: 0.00           Quit date: 3/15/1971    Smokeless tobacco: Never Used                        Alcohol use: Yes           0.0 oz/week       Comment: rare-  0-1 per month     Rfl:    Albuterol Sulfate HFA (PROAIR HFA) 108 (90 Base) MCG/ACT Inhalation Aero Soln 1-2 puffs every 4-6 hours as needed Disp: 1 Inhaler Rfl: 1   Spacer/Aero-Holding Chambers (AEROCHAMBER MINI CHAMBER) Does not apply Device Use as directed with proair Dis mass index is 42.7 kg/m² as calculated from the following:    Height as of this encounter: 61\". Weight as of this encounter: 226 lb. Vital signs reviewed. Appears stated age, well groomed.   Physical Exam:  GEN:  Patient is alert, awake and oriented, p Candidiasis of skin and nails     Benign neoplasm of colon     Eczema     Gastroesophageal reflux disease     Diaphragmatic hernia     Hypertension     Familial multiple lipoprotein-type hyperlipidemia     Obstructive sleep apnea     Osteoarthrosis     Per

## 2018-07-16 RX ORDER — FUROSEMIDE 20 MG/1
20 TABLET ORAL DAILY
Qty: 90 TABLET | Refills: 0 | Status: SHIPPED | OUTPATIENT
Start: 2018-07-16 | End: 2021-01-21

## 2018-07-16 NOTE — TELEPHONE ENCOUNTER
Future appt:     Your appointments     Date & Time Appointment Department [facility])    Jul 19, 2018 10:30 AM CDT Anti-Coag with EMG [address], [place] ([facility])    Aug 14, 2018 10:30 AM CDT Cornelia Garcia

## 2018-07-19 ENCOUNTER — ANTI-COAG VISIT (OUTPATIENT)
Dept: FAMILY MEDICINE CLINIC | Facility: CLINIC | Age: 78
End: 2018-07-19
Payer: MEDICARE

## 2018-07-19 DIAGNOSIS — Z79.01 LONG TERM (CURRENT) USE OF ANTICOAGULANTS: ICD-10-CM

## 2018-07-19 DIAGNOSIS — I48.21 PERMANENT ATRIAL FIBRILLATION (HCC): ICD-10-CM

## 2018-07-19 LAB — INR: 2.5 (ref 0.8–1.2)

## 2018-07-19 PROCEDURE — 93793 ANTICOAG MGMT PT WARFARIN: CPT | Performed by: FAMILY MEDICINE

## 2018-07-19 PROCEDURE — 85610 PROTHROMBIN TIME: CPT | Performed by: FAMILY MEDICINE

## 2018-07-19 NOTE — PROGRESS NOTES
Here for INR check in coumadin clinic in the office. CURRENT COUMADIN DOSE:  7mg daily     CHANGES OR COMPLAINTS:  No changes    INR RESULTS TODAY:  2.5 ( in goal)    PLAN:  CPM coumadin   Next INR in one month. Appointment scheduled.      AVS printed an

## 2018-07-30 NOTE — TELEPHONE ENCOUNTER
OK to close encounter   Patient seen by Dr Cherylene Muscat on 4/6/18 to address concern & had INR done 4/6/18.

## 2018-07-31 RX ORDER — WARFARIN SODIUM 6 MG/1
6 TABLET ORAL DAILY
Qty: 90 TABLET | Refills: 0 | Status: SHIPPED | OUTPATIENT
Start: 2018-07-31 | End: 2018-08-14 | Stop reason: DRUGHIGH

## 2018-07-31 RX ORDER — LISINOPRIL 5 MG/1
5 TABLET ORAL DAILY
Qty: 90 TABLET | Refills: 0 | Status: SHIPPED | OUTPATIENT
Start: 2018-07-31 | End: 2018-10-30

## 2018-07-31 RX ORDER — CLOPIDOGREL BISULFATE 75 MG/1
75 TABLET ORAL DAILY
Qty: 90 TABLET | Refills: 0 | Status: SHIPPED | OUTPATIENT
Start: 2018-07-31 | End: 2018-10-30

## 2018-07-31 NOTE — TELEPHONE ENCOUNTER
RF request from [facility] for Lisinopril 5 MG #90. Please advise. Future appt:     Your appointments     Date & Time Appointment Department [facility])    Aug 14, 2018 10:30 AM CDT Follow up with Cuba Rogers MD [address] [place] [address]

## 2018-07-31 NOTE — TELEPHONE ENCOUNTER
RF request from Jean Rubi for Clopidogrel 75 MG #90 and Warfarin 6 MG #90. Please advise. Future appt:     Your appointments     Date & Time Appointment Department [facility])    Aug 14, 2018 10:30 AM CDT Follow up with MD Marquise Iqbal

## 2018-08-14 ENCOUNTER — OFFICE VISIT (OUTPATIENT)
Dept: FAMILY MEDICINE CLINIC | Facility: CLINIC | Age: 78
End: 2018-08-14
Payer: MEDICARE

## 2018-08-14 VITALS
HEART RATE: 84 BPM | HEIGHT: 61 IN | BODY MASS INDEX: 41.95 KG/M2 | RESPIRATION RATE: 18 BRPM | DIASTOLIC BLOOD PRESSURE: 60 MMHG | TEMPERATURE: 97 F | OXYGEN SATURATION: 96 % | WEIGHT: 222.19 LBS | SYSTOLIC BLOOD PRESSURE: 96 MMHG

## 2018-08-14 DIAGNOSIS — G47.33 OBSTRUCTIVE SLEEP APNEA: ICD-10-CM

## 2018-08-14 DIAGNOSIS — I10 ESSENTIAL HYPERTENSION: ICD-10-CM

## 2018-08-14 DIAGNOSIS — E78.49 FAMILIAL MULTIPLE LIPOPROTEIN-TYPE HYPERLIPIDEMIA: ICD-10-CM

## 2018-08-14 DIAGNOSIS — E66.01 CLASS 3 SEVERE OBESITY DUE TO EXCESS CALORIES WITH SERIOUS COMORBIDITY AND BODY MASS INDEX (BMI) OF 40.0 TO 44.9 IN ADULT (HCC): ICD-10-CM

## 2018-08-14 DIAGNOSIS — Z79.01 LONG TERM (CURRENT) USE OF ANTICOAGULANTS: ICD-10-CM

## 2018-08-14 DIAGNOSIS — I48.21 PERMANENT ATRIAL FIBRILLATION (HCC): Primary | ICD-10-CM

## 2018-08-14 PROBLEM — IMO0001 CLASS 3 OBESITY DUE TO EXCESS CALORIES IN ADULT: Status: RESOLVED | Noted: 2017-11-22 | Resolved: 2018-08-14

## 2018-08-14 PROBLEM — R55 VASOVAGAL NEAR SYNCOPE: Status: RESOLVED | Noted: 2018-06-18 | Resolved: 2018-08-14

## 2018-08-14 PROBLEM — Z98.890 POST-OPERATIVE STATE: Status: RESOLVED | Noted: 2018-03-09 | Resolved: 2018-08-14

## 2018-08-14 PROCEDURE — 99214 OFFICE O/P EST MOD 30 MIN: CPT | Performed by: FAMILY MEDICINE

## 2018-08-14 RX ORDER — WARFARIN SODIUM 1 MG/1
1 TABLET ORAL DAILY
COMMUNITY
End: 2018-12-28

## 2018-08-14 RX ORDER — WARFARIN SODIUM 6 MG/1
6 TABLET ORAL DAILY
COMMUNITY
End: 2019-01-24

## 2018-08-14 NOTE — PROGRESS NOTES
[address]  PROGRESS NOTE  Chief Complaint:   Patient presents with:   Follow - Up: 1 month f/u      HPI:   This is a 69year old female coming in for medical f.u    Pt seeing Dr. Ben Marin Pt still on plavix -- planned til FEB-   Pt also on Mother    • Cancer Son    • Heart Disorder Brother    • Heart Disorder Brother    • Heart Disorder Brother    • Stroke Brother      Allergies:    Ambien [Zolpidem]       JITTMICHAEL  Benadryl [Diphenhyd*    JITTERY  Oxycodone               ITCHING    Comment:w given: Not Answered       REVIEW OF SYSTEMS:   CONSTITUTIONAL:  Denies unusual weight gain/loss, fever, chills, or fatigue. EENT:  Eyes:  Denies eye pain, visual loss, blurred vision, double vision or yellow sclerae.  Ears, Nose, Throat:  Denies hearing lo discharge Throat:  No tonsillar erythema or exudate. Mouth:  No oral lesions or ulcerations, good dentition. NECK: Supple, no thyromegaly. SKIN: No rashes, no skin lesion, no bruising, good turgor.   HEART:  Regular rate and rhythm, no murmurs, rubs or g is off Plavix which she anticipates to be 6 months from now.     6. Class 3 severe obesity due to excess calories with serious comorbidity and body mass index (BMI) of 40.0 to 44.9 in [facility])  Encourage increasing activity continuing to work with christine Patient verbalizes understanding. Patient is notified to call with any questions, complications, allergies, or worsening or changing symptoms. Patient is to call with any side effects or complications from the treatments as a result of today.

## 2018-08-14 NOTE — PATIENT INSTRUCTIONS
Continue medications, INR followups    Continue cardiact rehab    Trial tylenol at night    Encourage lists to help with stressors/ anxiety    Fasting labs and f.u October

## 2018-08-23 ENCOUNTER — ANTI-COAG VISIT (OUTPATIENT)
Dept: FAMILY MEDICINE CLINIC | Facility: CLINIC | Age: 78
End: 2018-08-23
Payer: MEDICARE

## 2018-08-23 DIAGNOSIS — I48.21 PERMANENT ATRIAL FIBRILLATION (HCC): ICD-10-CM

## 2018-08-23 DIAGNOSIS — Z79.01 LONG TERM (CURRENT) USE OF ANTICOAGULANTS: ICD-10-CM

## 2018-08-23 LAB — INR: 2.5 (ref 0.8–1.2)

## 2018-08-23 PROCEDURE — 85610 PROTHROMBIN TIME: CPT | Performed by: FAMILY MEDICINE

## 2018-08-23 PROCEDURE — 93793 ANTICOAG MGMT PT WARFARIN: CPT | Performed by: FAMILY MEDICINE

## 2018-08-23 NOTE — PATIENT INSTRUCTIONS
INR is good at 2.6  Continue same coumadin dose. INR in two weeks due to expected cortisone injection  Ok to reschedule INR to one month if you do not get cortisone injection.

## 2018-08-23 NOTE — PROGRESS NOTES
Here for INR check in coumadin clinic in the office. CURRENT COUMADIN DOSE:  7mg daily     CHANGES OR COMPLAINTS:  Consult today for cortisone injection. Generally she states injection would be scheduled for next week.      INR RESULTS TODAY:  2.5    KAITLIN

## 2018-09-06 ENCOUNTER — ANTI-COAG VISIT (OUTPATIENT)
Dept: FAMILY MEDICINE CLINIC | Facility: CLINIC | Age: 78
End: 2018-09-06
Payer: MEDICARE

## 2018-09-06 DIAGNOSIS — I48.21 PERMANENT ATRIAL FIBRILLATION (HCC): ICD-10-CM

## 2018-09-06 DIAGNOSIS — Z79.01 LONG TERM (CURRENT) USE OF ANTICOAGULANTS: ICD-10-CM

## 2018-09-06 LAB — INR: 1.4 (ref 0.8–1.2)

## 2018-09-06 PROCEDURE — 93793 ANTICOAG MGMT PT WARFARIN: CPT | Performed by: FAMILY MEDICINE

## 2018-09-06 PROCEDURE — 85610 PROTHROMBIN TIME: CPT | Performed by: FAMILY MEDICINE

## 2018-09-06 RX ORDER — ENOXAPARIN SODIUM 100 MG/ML
40 INJECTION SUBCUTANEOUS 2 TIMES DAILY
Qty: 4 SYRINGE | Refills: 0 | Status: SHIPPED | OUTPATIENT
Start: 2018-09-06 | End: 2018-09-08 | Stop reason: ALTCHOICE

## 2018-09-06 NOTE — PROGRESS NOTES
Discussed with Dr. Marvin Chen advises:   1) increasing coumadin to 10mg daily   2)Recheck INR in 3 days on Saturday  3)Start lovenox injections. Patient to take lovenox every 12 hours for 48 hrs. Dr. Marvin Chen sent script to [facility].

## 2018-09-06 NOTE — PROGRESS NOTES
Here for INR check in coumadin clinic in the office. CURRENT COUMADIN DOSE:  7mg daily     CHANGES OR COMPLAINTS:  Cortisone injection 10 days ago in back    INR RESULTS TODAY:  1.4 ( below goal)    Please advise.

## 2018-09-08 ENCOUNTER — TELEPHONE (OUTPATIENT)
Dept: FAMILY MEDICINE CLINIC | Facility: CLINIC | Age: 78
End: 2018-09-08

## 2018-09-08 ENCOUNTER — ANTI-COAG VISIT (OUTPATIENT)
Dept: FAMILY MEDICINE CLINIC | Facility: CLINIC | Age: 78
End: 2018-09-08
Payer: MEDICARE

## 2018-09-08 DIAGNOSIS — I48.21 PERMANENT ATRIAL FIBRILLATION (HCC): ICD-10-CM

## 2018-09-08 DIAGNOSIS — Z79.01 LONG TERM (CURRENT) USE OF ANTICOAGULANTS: ICD-10-CM

## 2018-09-08 LAB — INR: 2.2 (ref 0.8–1.2)

## 2018-09-08 PROCEDURE — 93793 ANTICOAG MGMT PT WARFARIN: CPT | Performed by: FAMILY MEDICINE

## 2018-09-08 PROCEDURE — 85610 PROTHROMBIN TIME: CPT | Performed by: FAMILY MEDICINE

## 2018-09-08 NOTE — TELEPHONE ENCOUNTER
Patient had cortisone injection in spine last week resulting in a low INR of 1.4 two days ago on Thursday. Dr. Thomos Northern prescribed two days worth of lovenox which she took Thursday and Friday. Today, Saturday, INR is now therapeutic at 2.2.    DESIRE negro

## 2018-09-08 NOTE — PROGRESS NOTES
Here for INR check in coumadin clinic in the office. CURRENT COUMADIN DOSE:  10mg daily     CHANGES OR COMPLAINTS:  Lovenox as INR was subtherapeutic 2 days ago at 1.4 following a cortisone injection in back.      INR RESULTS TODAY:  2.2 ( back up into g

## 2018-09-12 ENCOUNTER — ANTI-COAG VISIT (OUTPATIENT)
Dept: FAMILY MEDICINE CLINIC | Facility: CLINIC | Age: 78
End: 2018-09-12
Payer: MEDICARE

## 2018-09-12 DIAGNOSIS — Z79.01 LONG TERM (CURRENT) USE OF ANTICOAGULANTS: ICD-10-CM

## 2018-09-12 DIAGNOSIS — I48.21 PERMANENT ATRIAL FIBRILLATION (HCC): ICD-10-CM

## 2018-09-12 LAB — INR: 2.1 (ref 0.8–1.2)

## 2018-09-12 PROCEDURE — 93793 ANTICOAG MGMT PT WARFARIN: CPT | Performed by: FAMILY MEDICINE

## 2018-09-12 PROCEDURE — 85610 PROTHROMBIN TIME: CPT | Performed by: FAMILY MEDICINE

## 2018-09-12 NOTE — PROGRESS NOTES
Here for INR check in coumadin clinic in the office. CURRENT COUMADIN DOSE:  7mg daily     CHANGES OR COMPLAINTS:  No changes    INR RESULTS TODAY:  2.1 ( in goal)    PLAN:  CPM coumadin   Next INR in one week   Appointment scheduled.      AVS printed an

## 2018-09-19 ENCOUNTER — TELEPHONE (OUTPATIENT)
Dept: FAMILY MEDICINE CLINIC | Facility: CLINIC | Age: 78
End: 2018-09-19

## 2018-09-19 NOTE — TELEPHONE ENCOUNTER
Patient called because she thought she had appointment for tomorrow, there was an appointment for tomorrow 9/20 at 10am but it was canceled.  Patient would like to know why was it canceled and if she needs an appointment

## 2018-09-20 ENCOUNTER — ANTI-COAG VISIT (OUTPATIENT)
Dept: FAMILY MEDICINE CLINIC | Facility: CLINIC | Age: 78
End: 2018-09-20
Payer: MEDICARE

## 2018-09-20 DIAGNOSIS — I48.21 PERMANENT ATRIAL FIBRILLATION (HCC): ICD-10-CM

## 2018-09-20 DIAGNOSIS — Z79.01 LONG TERM (CURRENT) USE OF ANTICOAGULANTS: ICD-10-CM

## 2018-09-20 LAB — INR: 2.2 (ref 0.8–1.2)

## 2018-09-20 PROCEDURE — 85610 PROTHROMBIN TIME: CPT | Performed by: FAMILY MEDICINE

## 2018-09-20 NOTE — TELEPHONE ENCOUNTER
Uncertain as to why appointment was cancelled. Patient is to be seen this week. Patient notified and appt scheduled for today for INR.

## 2018-09-20 NOTE — PROGRESS NOTES
Here for INR check in coumadin clinic in the office.     CURRENT COUMADIN DOSE:  7mg daily     CHANGES OR COMPLAINTS:  Possible tooth extraction, not scheduled  Needs cardiac clearance prior to scheduling    INR RESULTS TODAY:  2.2 ( in goal)    PLAN:  CPM

## 2018-09-28 RX ORDER — METOPROLOL SUCCINATE 50 MG/1
TABLET, EXTENDED RELEASE ORAL
Qty: 180 TABLET | Refills: 0 | Status: SHIPPED | OUTPATIENT
Start: 2018-09-28 | End: 2018-12-26

## 2018-09-28 RX ORDER — POTASSIUM CHLORIDE 750 MG/1
TABLET, EXTENDED RELEASE ORAL
Qty: 90 TABLET | Refills: 0 | Status: SHIPPED | OUTPATIENT
Start: 2018-09-28 | End: 2018-12-26

## 2018-09-28 NOTE — TELEPHONE ENCOUNTER
Future appt:     Your appointments     Date & Time Appointment Department [facility])    Oct 04, 2018 10:00 AM CDT Anti-Coag with EMG [address] [place] [address],[address], [place] ([facility])    Oct 24, 2018  8:15 AM CDT Labor

## 2018-10-04 ENCOUNTER — ANTI-COAG VISIT (OUTPATIENT)
Dept: FAMILY MEDICINE CLINIC | Facility: CLINIC | Age: 78
End: 2018-10-04
Payer: MEDICARE

## 2018-10-04 DIAGNOSIS — Z79.01 LONG TERM (CURRENT) USE OF ANTICOAGULANTS: ICD-10-CM

## 2018-10-04 DIAGNOSIS — I48.21 PERMANENT ATRIAL FIBRILLATION (HCC): ICD-10-CM

## 2018-10-04 PROCEDURE — 93793 ANTICOAG MGMT PT WARFARIN: CPT | Performed by: FAMILY MEDICINE

## 2018-10-04 PROCEDURE — 85610 PROTHROMBIN TIME: CPT | Performed by: FAMILY MEDICINE

## 2018-10-10 ENCOUNTER — TELEPHONE (OUTPATIENT)
Dept: FAMILY MEDICINE CLINIC | Facility: CLINIC | Age: 78
End: 2018-10-10

## 2018-10-24 ENCOUNTER — LABORATORY ENCOUNTER (OUTPATIENT)
Dept: LAB | Age: 78
End: 2018-10-24
Attending: FAMILY MEDICINE
Payer: MEDICARE

## 2018-10-24 DIAGNOSIS — E78.49 FAMILIAL MULTIPLE LIPOPROTEIN-TYPE HYPERLIPIDEMIA: ICD-10-CM

## 2018-10-24 DIAGNOSIS — I10 ESSENTIAL HYPERTENSION: ICD-10-CM

## 2018-10-24 DIAGNOSIS — I48.21 PERMANENT ATRIAL FIBRILLATION (HCC): ICD-10-CM

## 2018-10-24 DIAGNOSIS — R82.90 ABNORMAL URINALYSIS: Primary | ICD-10-CM

## 2018-10-24 PROCEDURE — 81001 URINALYSIS AUTO W/SCOPE: CPT

## 2018-10-24 PROCEDURE — 80061 LIPID PANEL: CPT

## 2018-10-24 PROCEDURE — 84443 ASSAY THYROID STIM HORMONE: CPT

## 2018-10-24 PROCEDURE — 80053 COMPREHEN METABOLIC PANEL: CPT

## 2018-10-24 PROCEDURE — 85025 COMPLETE CBC W/AUTO DIFF WBC: CPT

## 2018-10-24 PROCEDURE — 36415 COLL VENOUS BLD VENIPUNCTURE: CPT

## 2018-10-25 ENCOUNTER — TELEPHONE (OUTPATIENT)
Dept: FAMILY MEDICINE CLINIC | Facility: CLINIC | Age: 78
End: 2018-10-25

## 2018-10-25 NOTE — TELEPHONE ENCOUNTER
----- Message from Jaycob Hargrove MD sent at 10/24/2018  8:56 PM CDT -----  Ryder Sessions results reviewed. Patient's CBC Cam panel thyroid function and lipid profile all appear stable.   Patient's urinalysis is abnormal.  It appears that there was a culture

## 2018-10-25 NOTE — TELEPHONE ENCOUNTER
Patient informed of the below results. Patient denies having any urinary symptoms at this time. Patient will stop in tomorrow to provide a urine specimen for culture. Appt scheduled.      Future Appointments   Date Time Provider Cora Palafox   10/26

## 2018-10-26 ENCOUNTER — APPOINTMENT (OUTPATIENT)
Dept: LAB | Age: 78
End: 2018-10-26
Attending: FAMILY MEDICINE
Payer: MEDICARE

## 2018-10-26 DIAGNOSIS — R82.90 ABNORMAL URINALYSIS: ICD-10-CM

## 2018-10-26 PROCEDURE — 87086 URINE CULTURE/COLONY COUNT: CPT

## 2018-10-29 ENCOUNTER — TELEPHONE (OUTPATIENT)
Dept: FAMILY MEDICINE CLINIC | Facility: CLINIC | Age: 78
End: 2018-10-29

## 2018-10-29 NOTE — TELEPHONE ENCOUNTER
Pt called back,  Attempted to call again,  Left detailed message for pt, asked pt to call with any questions.

## 2018-10-30 ENCOUNTER — OFFICE VISIT (OUTPATIENT)
Dept: FAMILY MEDICINE CLINIC | Facility: CLINIC | Age: 78
End: 2018-10-30
Payer: MEDICARE

## 2018-10-30 VITALS
SYSTOLIC BLOOD PRESSURE: 122 MMHG | DIASTOLIC BLOOD PRESSURE: 62 MMHG | RESPIRATION RATE: 18 BRPM | HEART RATE: 68 BPM | TEMPERATURE: 98 F | WEIGHT: 230.81 LBS | OXYGEN SATURATION: 94 % | BODY MASS INDEX: 43.58 KG/M2 | HEIGHT: 61 IN

## 2018-10-30 DIAGNOSIS — I48.21 PERMANENT ATRIAL FIBRILLATION (HCC): Primary | ICD-10-CM

## 2018-10-30 DIAGNOSIS — I10 ESSENTIAL HYPERTENSION: ICD-10-CM

## 2018-10-30 DIAGNOSIS — I10 ESSENTIAL HYPERTENSION, BENIGN: ICD-10-CM

## 2018-10-30 DIAGNOSIS — E78.49 FAMILIAL MULTIPLE LIPOPROTEIN-TYPE HYPERLIPIDEMIA: ICD-10-CM

## 2018-10-30 DIAGNOSIS — E78.00 PURE HYPERCHOLESTEROLEMIA: ICD-10-CM

## 2018-10-30 PROCEDURE — 99214 OFFICE O/P EST MOD 30 MIN: CPT | Performed by: FAMILY MEDICINE

## 2018-10-30 RX ORDER — ALBUTEROL SULFATE 90 UG/1
AEROSOL, METERED RESPIRATORY (INHALATION)
Qty: 1 INHALER | Refills: 2 | Status: SHIPPED | OUTPATIENT
Start: 2018-10-30 | End: 2018-12-28

## 2018-10-30 RX ORDER — CLOPIDOGREL BISULFATE 75 MG/1
75 TABLET ORAL DAILY
Qty: 90 TABLET | Refills: 0 | Status: SHIPPED | OUTPATIENT
Start: 2018-10-30 | End: 2019-01-24

## 2018-10-30 RX ORDER — LISINOPRIL 5 MG/1
5 TABLET ORAL DAILY
Qty: 90 TABLET | Refills: 0 | Status: SHIPPED | OUTPATIENT
Start: 2018-10-30 | End: 2019-01-22

## 2018-10-30 NOTE — TELEPHONE ENCOUNTER
RF request from [employer], [address] for Lisinopril 5 MG #90 and Clopidogrel 75 MG #90. Please advise. Future appt:     Your appointments     Date & Time Appointment Department [facility])    Nov 01, 2018 10:00 AM CDT Anti-Coag with EMG COUMADIN NURSE Geronimo Hong

## 2018-10-30 NOTE — PATIENT INSTRUCTIONS
Continue medications    Pt to continue cardiac rehab    rec proair inhaler as needed for wheezing    ADD FLONASE nasal spray 1 spray each nares 2 x a day for 2 weeks, then daily    Recheck for physcal and labs again in 3-4 months  Fasting labs before see c

## 2018-10-30 NOTE — PROGRESS NOTES
[facility] [place]  PROGRESS NOTE  Chief Complaint:   Patient presents with:  Medication Follow-Up      HPI:   This is a 69year old female coming in for medical followup  Weight increased. Pt noted increase stress.   Pt doing well in Cardiac elliot Retired    Tobacco Use      Smoking status: Former Smoker        Quit date: 3/15/1971        Years since quittin.6      Smokeless tobacco: Never Used    Substance and Sexual Activity      Alcohol use:  Yes        Alcohol/week: 0.0 oz        Comment: earnest mouth daily. Disp:  Rfl:    Cholecalciferol (VITAMIN D) 2000 units Oral Cap Take 2,000 Units by mouth daily.  Disp:  Rfl:    LOVASTATIN 20 MG Oral Tab TAKE ONE TABLET BY MOUTH EVERY DAY Disp: 90 tablet Rfl: 1   METOPROLOL SUCCINATE ER 50 MG Oral Tablet 24 H Size: large)   Pulse 68   Temp 97.5 °F (36.4 °C) (Temporal)   Resp 18   Ht 61\"   Wt 230 lb 12.8 oz   SpO2 94%   BMI 43.61 kg/m²  Estimated body mass index is 43.61 kg/m² as calculated from the following:    Height as of this encounter: 61\".     Weight as clinically stable and generally doing well. Discussed with patient mindfulness in her diet and eating habits. Patient to continue with working with cardiac rehab.   Patient has met with the pharmacist and reviewed her medication feels better about taking (PROAIR HFA) 108 (90 Base) MCG/ACT Inhalation Aero Soln 1 Inhaler 2     Si-2 puffs every 4-6 hours as needed       Health Maintenance:        Cindi Barreto MD  10/30/2018  9:41 AM    Patient/Caregiver Education: Patient/Caregiver Education: There

## 2018-11-01 ENCOUNTER — ANTI-COAG VISIT (OUTPATIENT)
Dept: FAMILY MEDICINE CLINIC | Facility: CLINIC | Age: 78
End: 2018-11-01
Payer: MEDICARE

## 2018-11-01 DIAGNOSIS — Z79.01 LONG TERM (CURRENT) USE OF ANTICOAGULANTS: ICD-10-CM

## 2018-11-01 DIAGNOSIS — I48.21 PERMANENT ATRIAL FIBRILLATION (HCC): ICD-10-CM

## 2018-11-01 PROCEDURE — 85610 PROTHROMBIN TIME: CPT | Performed by: NURSE PRACTITIONER

## 2018-11-01 NOTE — PROGRESS NOTES
Venous INR drawn. Patient informed to take her regular scheduled coumadin dose tonight and we will call her with INR results and further coumadin orders tomorrow.

## 2018-11-01 NOTE — PROGRESS NOTES
Here for INR. Test strip problem with fingerstick coagucheck today. Patients must have venous INR draw. Order pendeded for your signature.

## 2018-11-02 ENCOUNTER — ANTI-COAG VISIT (OUTPATIENT)
Dept: FAMILY MEDICINE CLINIC | Facility: CLINIC | Age: 78
End: 2018-11-02

## 2018-11-02 DIAGNOSIS — I48.21 PERMANENT ATRIAL FIBRILLATION (HCC): ICD-10-CM

## 2018-11-02 DIAGNOSIS — Z79.01 LONG TERM (CURRENT) USE OF ANTICOAGULANTS: ICD-10-CM

## 2018-11-02 RX ORDER — WARFARIN SODIUM 1 MG/1
2 TABLET ORAL NIGHTLY
Qty: 60 TABLET | Refills: 3 | Status: SHIPPED | OUTPATIENT
Start: 2018-11-02 | End: 2019-02-20 | Stop reason: DRUGHIGH

## 2018-11-02 NOTE — TELEPHONE ENCOUNTER
See anti-coag note- pt currently taking 7mg daily. Called [facility], pt is currently needing 1mg dose. Renewed- they manage pt pill packs. Agustina Veras at [facility] states that  pt is getting Warfarin- 1 tablet 5 mg dose with 2 tablets of 1 mg =7mg daily.     Not able

## 2018-11-02 NOTE — PROGRESS NOTES
Pt had lab draw yesterday. INR 1.99. Pt denies any current problems- no changes reported. Pt taking 7mg daily. Mar Butcher. Will return back on Monday.

## 2018-11-02 NOTE — PROGRESS NOTES
Pt informed,  appt given.     Future Appointments   Date Time Provider Cora Vivienne   11/8/2018 10:00 AM [facility] COUMADIN NURSE [facility] [place] [facility] [place]

## 2018-11-06 ENCOUNTER — OFFICE VISIT (OUTPATIENT)
Dept: FAMILY MEDICINE CLINIC | Facility: CLINIC | Age: 78
End: 2018-11-06
Payer: MEDICARE

## 2018-11-06 ENCOUNTER — HOSPITAL ENCOUNTER (OUTPATIENT)
Dept: GENERAL RADIOLOGY | Age: 78
Discharge: HOME | End: 2018-11-06
Attending: FAMILY MEDICINE
Payer: MEDICARE

## 2018-11-06 VITALS
TEMPERATURE: 97 F | RESPIRATION RATE: 22 BRPM | BODY MASS INDEX: 43.27 KG/M2 | OXYGEN SATURATION: 96 % | SYSTOLIC BLOOD PRESSURE: 108 MMHG | WEIGHT: 229.19 LBS | HEIGHT: 61 IN | DIASTOLIC BLOOD PRESSURE: 58 MMHG | HEART RATE: 82 BPM

## 2018-11-06 DIAGNOSIS — J20.9 ACUTE BRONCHITIS, UNSPECIFIED ORGANISM: Primary | ICD-10-CM

## 2018-11-06 DIAGNOSIS — J20.9 ACUTE BRONCHITIS, UNSPECIFIED ORGANISM: ICD-10-CM

## 2018-11-06 PROCEDURE — 99214 OFFICE O/P EST MOD 30 MIN: CPT | Performed by: FAMILY MEDICINE

## 2018-11-06 PROCEDURE — 71046 X-RAY EXAM CHEST 2 VIEWS: CPT | Performed by: FAMILY MEDICINE

## 2018-11-06 RX ORDER — WARFARIN SODIUM 6 MG/1
6 TABLET ORAL DAILY
Qty: 90 TABLET | Refills: 0 | Status: SHIPPED | OUTPATIENT
Start: 2018-11-06 | End: 2018-12-28

## 2018-11-06 RX ORDER — AMOXICILLIN AND CLAVULANATE POTASSIUM 875; 125 MG/1; MG/1
1 TABLET, FILM COATED ORAL 2 TIMES DAILY
Qty: 20 TABLET | Refills: 0 | Status: SHIPPED | OUTPATIENT
Start: 2018-11-06 | End: 2018-11-16

## 2018-11-06 RX ORDER — BENZONATATE 200 MG/1
200 CAPSULE ORAL 3 TIMES DAILY PRN
Qty: 30 CAPSULE | Refills: 1 | Status: SHIPPED | OUTPATIENT
Start: 2018-11-06 | End: 2019-02-20 | Stop reason: ALTCHOICE

## 2018-11-06 NOTE — PROGRESS NOTES
Chief Complaint:   Patient presents with:  Cough  Chest Congestion  Wheezing      HPI:   This is a 69year old female coming in for acute illness with cough and wheezing. Patient with a history of pneumonia. Symptoms have been present just under 2 weeks. MOUTH DAILY. Disp: 90 tablet Rfl: 0   LISINOPRIL 5 MG Oral Tab TAKE 1 TABLET (5 MG TOTAL) BY MOUTH DAILY. Disp: 90 tablet Rfl: 0   METOPROLOL SUCCINATE ER 50 MG Oral Tablet 24 Hr TAKE ONE TABLET BY MOUTH TWO TIMES DAILY.  Disp: 180 tablet Rfl: 0   POTASSIUM discomfort, palpitations, edema,    RESPIRATORY:  See hPI   Denies shortness of breath, , or sputum production. GASTROINTESTINAL:  Denies abdominal pain, nausea, vomiting, constipation, diarrhea  : denies dysuria, no urinary frequency , no discharge. noted.    ASSESSMENT AND PLAN:   1. Acute bronchitis, unspecified organism    - XR CHEST PA + LAT CHEST (CPT=71046);  Future    Meds & Refills for this Visit:  Requested Prescriptions     Signed Prescriptions Disp Refills   • Amoxicillin-Pot Clavulanate (AU esophagitis     NSTEMI (non-ST elevated myocardial infarction) (HCC)     Pure hypercholesterolemia     Transient cerebral ischemia     Pneumonia due to influenza A virus     Infection     Sleep disturbance     Anxiety     Hematoma      Carmita Calderon MD

## 2018-11-07 ENCOUNTER — TELEPHONE (OUTPATIENT)
Dept: FAMILY MEDICINE CLINIC | Facility: CLINIC | Age: 78
End: 2018-11-07

## 2018-11-07 NOTE — TELEPHONE ENCOUNTER
----- Message from Isaura Negrete MD sent at 11/7/2018  4:30 PM CST -----  CXR report reviewed -  No pneumonia noted.      Report does still show area of vertebrae with abnormality at T9 - this is consistent with last CT scan

## 2018-11-08 ENCOUNTER — NURSE ONLY (OUTPATIENT)
Dept: FAMILY MEDICINE CLINIC | Facility: CLINIC | Age: 78
End: 2018-11-08
Payer: MEDICARE

## 2018-11-08 DIAGNOSIS — Z79.01 LONG TERM (CURRENT) USE OF ANTICOAGULANTS: Primary | ICD-10-CM

## 2018-11-08 PROCEDURE — 85610 PROTHROMBIN TIME: CPT | Performed by: FAMILY MEDICINE

## 2018-11-08 NOTE — PROGRESS NOTES
Patient here in anticoagulation clinic for INR test.     Due to recent Coagucheck XS test strip recall a venous INR must be drawn. Patient informed. Venous INR drawn.      Patient instructed to take regularly scheduled coumadin dose today and when INR

## 2018-11-09 ENCOUNTER — ANTI-COAG VISIT (OUTPATIENT)
Dept: FAMILY MEDICINE CLINIC | Facility: CLINIC | Age: 78
End: 2018-11-09

## 2018-11-09 DIAGNOSIS — Z79.01 LONG TERM (CURRENT) USE OF ANTICOAGULANTS: ICD-10-CM

## 2018-11-09 DIAGNOSIS — I48.21 PERMANENT ATRIAL FIBRILLATION (HCC): ICD-10-CM

## 2018-11-09 PROCEDURE — 93793 ANTICOAG MGMT PT WARFARIN: CPT | Performed by: FAMILY MEDICINE

## 2018-11-09 NOTE — PROGRESS NOTES
Patient notified of INR results and coumadin instructions. Patient agrees to increase coumadin dose some so that she can eat some vegetables and a salad in diet. [facility] notified of new orders for pill packs. Pill packs go out on Monday.  Okay to wait fo

## 2018-11-09 NOTE — PROGRESS NOTES
Venous INR draw resulted by [facility]. CURRENT COUMADIN DOSE:  7mg daily      INR RESULTS TODAY:  2.0 ( just in goal)    PLAN:  Increase coumadin to 8mg M,F and 7mg daily the rest of the week   Next INR in 2 weeks.      Message left for patient to ret

## 2018-11-23 ENCOUNTER — APPOINTMENT (OUTPATIENT)
Dept: LAB | Age: 78
End: 2018-11-23
Attending: FAMILY MEDICINE
Payer: MEDICARE

## 2018-11-23 ENCOUNTER — ANTI-COAG VISIT (OUTPATIENT)
Dept: FAMILY MEDICINE CLINIC | Facility: CLINIC | Age: 78
End: 2018-11-23

## 2018-11-23 ENCOUNTER — TELEPHONE (OUTPATIENT)
Dept: FAMILY MEDICINE CLINIC | Facility: CLINIC | Age: 78
End: 2018-11-23

## 2018-11-23 DIAGNOSIS — I48.21 PERMANENT ATRIAL FIBRILLATION (HCC): ICD-10-CM

## 2018-11-23 DIAGNOSIS — Z79.01 LONG TERM (CURRENT) USE OF ANTICOAGULANTS: ICD-10-CM

## 2018-11-23 PROCEDURE — 36415 COLL VENOUS BLD VENIPUNCTURE: CPT

## 2018-11-23 PROCEDURE — 93793 ANTICOAG MGMT PT WARFARIN: CPT | Performed by: FAMILY MEDICINE

## 2018-11-23 PROCEDURE — 85610 PROTHROMBIN TIME: CPT

## 2018-11-23 NOTE — PROGRESS NOTES
See note attached to INR lab report dated 11/23. Per CR-  Pt in range- pt to continue dose and recheck in one month.   Left message for pt, requested call back

## 2018-11-23 NOTE — TELEPHONE ENCOUNTER
----- Message from Ruth Merrill MD sent at 11/23/2018  4:32 PM CST -----  In range INR- continue dose and recheck 1 month

## 2018-11-27 NOTE — PROGRESS NOTES
Patient notified of Dr. Allie Silva instructions. INR scheduled for next month. Advise patient to have INR drawn in the lab. ( difficult draw)   Order placed.

## 2018-12-10 ENCOUNTER — APPOINTMENT (OUTPATIENT)
Dept: LAB | Age: 78
End: 2018-12-10
Attending: FAMILY MEDICINE
Payer: MEDICARE

## 2018-12-10 DIAGNOSIS — Z79.01 LONG TERM (CURRENT) USE OF ANTICOAGULANTS: ICD-10-CM

## 2018-12-10 PROCEDURE — 36415 COLL VENOUS BLD VENIPUNCTURE: CPT

## 2018-12-10 PROCEDURE — 85610 PROTHROMBIN TIME: CPT

## 2018-12-11 ENCOUNTER — ANTI-COAG VISIT (OUTPATIENT)
Dept: FAMILY MEDICINE CLINIC | Facility: CLINIC | Age: 78
End: 2018-12-11

## 2018-12-11 DIAGNOSIS — I48.21 PERMANENT ATRIAL FIBRILLATION (HCC): ICD-10-CM

## 2018-12-11 DIAGNOSIS — Z79.01 LONG TERM (CURRENT) USE OF ANTICOAGULANTS: ICD-10-CM

## 2018-12-11 PROCEDURE — 93793 ANTICOAG MGMT PT WARFARIN: CPT | Performed by: FAMILY MEDICINE

## 2018-12-11 NOTE — PROGRESS NOTES
Venous INR draw resulted by [facility]. Message left for patient to return call. Patient returned call.      CURRENT COUMADIN DOSE:  7mg daily     CHANGES OR COMPLAINTS:  No changes    INR RESULTS TODAY:  1.75 ( below goal)     PLAN:  8mg today, then

## 2018-12-12 ENCOUNTER — TELEPHONE (OUTPATIENT)
Dept: FAMILY MEDICINE CLINIC | Facility: CLINIC | Age: 78
End: 2018-12-12

## 2018-12-12 NOTE — TELEPHONE ENCOUNTER
Fiorella Scanlon from [facility] called, states they help with pt pill packs, needed to f/u on pt current INR instructions. States she hasn't had update in one month. Fiorella Scanlon informed she had INR yesterday- 12/11, instructions reviewed- see flow sheet.   Pt to f/u for INR

## 2018-12-20 ENCOUNTER — ANTI-COAG VISIT (OUTPATIENT)
Dept: FAMILY MEDICINE CLINIC | Facility: CLINIC | Age: 78
End: 2018-12-20
Payer: MEDICARE

## 2018-12-20 DIAGNOSIS — I48.21 PERMANENT ATRIAL FIBRILLATION (HCC): ICD-10-CM

## 2018-12-20 DIAGNOSIS — Z79.01 LONG TERM (CURRENT) USE OF ANTICOAGULANTS: ICD-10-CM

## 2018-12-20 PROCEDURE — 85610 PROTHROMBIN TIME: CPT | Performed by: FAMILY MEDICINE

## 2018-12-20 NOTE — PROGRESS NOTES
Here for INR check in coumadin clinic in the office.     CURRENT COUMADIN DOSE:  8mg M,T, F and 7mg daily the rest of the week     CHANGES OR COMPLAINTS:  Ate less greens and vegetables    INR RESULTS TODAY:  3.1 ( slightly above goal)     PLAN:  Resume reg

## 2018-12-21 ENCOUNTER — TELEPHONE (OUTPATIENT)
Dept: FAMILY MEDICINE CLINIC | Facility: CLINIC | Age: 78
End: 2018-12-21

## 2018-12-21 NOTE — TELEPHONE ENCOUNTER
Referral to [address] [place], [place] [address]alicia Blanca [facility])  Fax #  [phone]. Was told to call with information.   Please give the patient a call back

## 2018-12-26 ENCOUNTER — TELEPHONE (OUTPATIENT)
Dept: FAMILY MEDICINE CLINIC | Facility: CLINIC | Age: 78
End: 2018-12-26

## 2018-12-26 RX ORDER — METOPROLOL SUCCINATE 50 MG/1
TABLET, EXTENDED RELEASE ORAL
Qty: 180 TABLET | Refills: 0 | Status: SHIPPED | OUTPATIENT
Start: 2018-12-26 | End: 2019-03-28

## 2018-12-26 RX ORDER — POTASSIUM CHLORIDE 750 MG/1
TABLET, EXTENDED RELEASE ORAL
Qty: 90 TABLET | Refills: 0 | Status: SHIPPED | OUTPATIENT
Start: 2018-12-26 | End: 2019-03-28

## 2018-12-26 NOTE — TELEPHONE ENCOUNTER
Future Appointments   Date Time Provider Cora Vivienne   12/26/2018  1:30 PM [facility] COUMADIN NURSE [facility] [place] [facility] [place]   12/28/2018  9:00 AM Samantha Leahy MD [facility] [place] [facility] [place]     Pt informed that referral to pain clinic for back pain

## 2018-12-26 NOTE — TELEPHONE ENCOUNTER
Please advise refills of Potassium 10mEq and Metoprolol 50mg. Last Rx: 9/28/18    Future appt:     Your appointments     Date & Time Appointment Department [facility])    Dec 28, 2018  9:00 AM CST Medicare Annual Well Visit with Fabiana Damon MD [address]

## 2018-12-26 NOTE — TELEPHONE ENCOUNTER
See telephone call from 12/26/18. Pt was instructed that any referrals need to be addressed at an appt. Pt has appt scheduled with CR on 12/28/18. Pt verbalized understanding.     Future Appointments   Date Time Provider Cora Palafox   12/26/2018  1

## 2018-12-26 NOTE — TELEPHONE ENCOUNTER
Wanting to confirm with you that the referral to the pain clinic was sent. They will not schedule an appt for her till they receive it. Please call back today.

## 2018-12-26 NOTE — TELEPHONE ENCOUNTER
Missed her inr appt today. Coming in on Fri for px. Wanting to know if someone could do her inr at that time Kiko Hale not here on Fri).

## 2018-12-27 ENCOUNTER — TELEPHONE (OUTPATIENT)
Dept: FAMILY MEDICINE CLINIC | Facility: CLINIC | Age: 78
End: 2018-12-27

## 2018-12-27 NOTE — TELEPHONE ENCOUNTER
This is in regards to telephone encounter from yesterday. Please see that encounter for patient contact.

## 2018-12-27 NOTE — TELEPHONE ENCOUNTER
Yes, INR appt added to nurse schedule. INR can be done when patient is here to see Dr. Tal Noguera tomorrow.

## 2018-12-28 ENCOUNTER — ANTI-COAG VISIT (OUTPATIENT)
Dept: FAMILY MEDICINE CLINIC | Facility: CLINIC | Age: 78
End: 2018-12-28
Payer: MEDICARE

## 2018-12-28 ENCOUNTER — HOSPITAL ENCOUNTER (OUTPATIENT)
Dept: GENERAL RADIOLOGY | Age: 78
Discharge: HOME | End: 2018-12-28
Attending: FAMILY MEDICINE
Payer: MEDICARE

## 2018-12-28 ENCOUNTER — TELEPHONE (OUTPATIENT)
Dept: FAMILY MEDICINE CLINIC | Facility: CLINIC | Age: 78
End: 2018-12-28

## 2018-12-28 ENCOUNTER — OFFICE VISIT (OUTPATIENT)
Dept: FAMILY MEDICINE CLINIC | Facility: CLINIC | Age: 78
End: 2018-12-28
Payer: MEDICARE

## 2018-12-28 VITALS
RESPIRATION RATE: 20 BRPM | SYSTOLIC BLOOD PRESSURE: 116 MMHG | HEIGHT: 60 IN | OXYGEN SATURATION: 96 % | WEIGHT: 222.81 LBS | BODY MASS INDEX: 43.74 KG/M2 | TEMPERATURE: 99 F | HEART RATE: 90 BPM | DIASTOLIC BLOOD PRESSURE: 56 MMHG

## 2018-12-28 DIAGNOSIS — M48.00 SPINAL STENOSIS, UNSPECIFIED SPINAL REGION: ICD-10-CM

## 2018-12-28 DIAGNOSIS — M25.512 ACUTE PAIN OF LEFT SHOULDER: Primary | ICD-10-CM

## 2018-12-28 DIAGNOSIS — Z79.01 LONG TERM (CURRENT) USE OF ANTICOAGULANTS: ICD-10-CM

## 2018-12-28 DIAGNOSIS — I48.21 PERMANENT ATRIAL FIBRILLATION (HCC): ICD-10-CM

## 2018-12-28 DIAGNOSIS — M25.512 ACUTE PAIN OF LEFT SHOULDER: ICD-10-CM

## 2018-12-28 DIAGNOSIS — J40 BRONCHITIS: ICD-10-CM

## 2018-12-28 PROCEDURE — 85610 PROTHROMBIN TIME: CPT | Performed by: FAMILY MEDICINE

## 2018-12-28 PROCEDURE — 99215 OFFICE O/P EST HI 40 MIN: CPT | Performed by: FAMILY MEDICINE

## 2018-12-28 PROCEDURE — 73030 X-RAY EXAM OF SHOULDER: CPT | Performed by: FAMILY MEDICINE

## 2018-12-28 RX ORDER — ALBUTEROL SULFATE 90 UG/1
AEROSOL, METERED RESPIRATORY (INHALATION)
Qty: 1 INHALER | Refills: 2 | Status: SHIPPED | OUTPATIENT
Start: 2018-12-28 | End: 2020-10-05

## 2018-12-28 RX ORDER — AZITHROMYCIN 250 MG/1
TABLET, FILM COATED ORAL
Qty: 6 TABLET | Refills: 0 | Status: SHIPPED | OUTPATIENT
Start: 2018-12-28 | End: 2019-02-20 | Stop reason: ALTCHOICE

## 2018-12-28 NOTE — PROGRESS NOTES
Please see office note and anti-coag calender for dosing. Pt was given instructions to take 8mg of Warfarin today, Saturday, Monday, 7mg on Sunday and Tuesday and to follow up for INR again on Wed. Pt was instructed to schedule her appt.     Future Appoin

## 2018-12-28 NOTE — PATIENT INSTRUCTIONS
Plan for warfarin  - 8 mg today  8 me SAturday- tomorrow  Then 8 mg MWF and & MG other days    Rest, fluids, hydrate,   Use mucinex DM for nasal congestin and cough. Vitamin c, zinc, and garlic to help your immune system.    Tylenol and ibuprofen as appro

## 2018-12-28 NOTE — TELEPHONE ENCOUNTER
----- Message from Teressa Dover MD sent at 12/28/2018 12:19 PM CST -----  Mild arthritic changes to the left shoulder. Patient may have referral to physical therapy of choice.

## 2018-12-28 NOTE — TELEPHONE ENCOUNTER
1.  Pt would to go to [facility] for PT. 2. Pt asking for refill of Albuterol inhaler- Judith Singh  3. Pt states she read not take Azithromycin with antacid-  Pt taking Pantoprazole. Okay to space out or should pt stop all together while on antx.

## 2018-12-28 NOTE — PROGRESS NOTES
Pt INR today 1.8. Pt taking Warfarin 8mg on Mon/Friday, 7mg rest of days.   Pt seen per CR- presented with URI s/s.-  Started 1-2 days ago

## 2018-12-28 NOTE — TELEPHONE ENCOUNTER
[facility] for pt referral  Ok for refill albuterol  Hold ppi while on zithromax.

## 2018-12-28 NOTE — PROGRESS NOTES
[facility]  [facility]  Chief Complaint:   Patient presents with:  Cough:     INR today 1.8-  see anti-coag flow sheet  Wheezing  Chest Congestion: green  Shortness Of Breath      HPI:   This is a 69year old [facility])    • Essential hypertension    • Hyperlipidemia    • Myocardial infarction [facility])      Past Surgical History:   Procedure Laterality Date   • LEEP      cervical dysplasia   • LUMPECTOMY LEFT     • LUMPECTOMY RIGHT     • OTHER      Surgery on both feet Disp: 90 tablet Rfl: 0   ipratropium-albuterol 0.5-2.5 (3) MG/3ML Inhalation Solution Inhale 3 mL into the lungs every 4 (four) hours as needed. Disp:  Rfl:    Pantoprazole Sodium 40 MG Oral Tab EC Take 40 mg by mouth daily.  Disp:  Rfl:    Cholecalciferol rest.  RESPIRATORY:  Denies shortness of breath, wheezing, cough or sputum. GASTROINTESTINAL:  Denies abdominal pain, nausea, vomiting, constipation, diarrhea, or blood in stool.   MUSCULOSKELETAL:  Denies weakness, muscle aches, back pain, joint pain, swe gait.  EXTREMITIES:  No edema. Left shoulder with arthritic changes with palpable tenderness of the AC joint. Patient able to abduct to 90 degrees rotation decreased with decreased internal/external rotation. NEURO:  No deficit, normal gait.   PSYCH:  No myocardial infarction) ([facility] [address]     Pure hypercholesterolemia     Transient cerebral ischemia     Pneumonia due to influenza A virus     Infection     Sleep disturbance     Anxiety     Hematoma     Spinal stenosis     Bronchitis     Acute pain of left shoulde

## 2019-01-02 ENCOUNTER — ANTI-COAG VISIT (OUTPATIENT)
Dept: FAMILY MEDICINE CLINIC | Facility: CLINIC | Age: 79
End: 2019-01-02
Payer: MEDICARE

## 2019-01-02 DIAGNOSIS — Z79.01 LONG TERM (CURRENT) USE OF ANTICOAGULANTS: ICD-10-CM

## 2019-01-02 DIAGNOSIS — I48.21 PERMANENT ATRIAL FIBRILLATION (HCC): ICD-10-CM

## 2019-01-02 LAB — INR: 2.2 (ref 0.8–1.2)

## 2019-01-02 PROCEDURE — 93793 ANTICOAG MGMT PT WARFARIN: CPT | Performed by: FAMILY MEDICINE

## 2019-01-02 PROCEDURE — 85610 PROTHROMBIN TIME: CPT | Performed by: FAMILY MEDICINE

## 2019-01-02 NOTE — PROGRESS NOTES
Here for INR check in coumadin clinic in the office.     CURRENT COUMADIN DOSE:  8mg F,Sa,M and 7mg daily the rest of the week     CHANGES OR COMPLAINTS:  No changes    INR RESULTS TODAY:  2.2 ( in goal)    PLAN:  CPM coumadin  Continue to eat a serving of

## 2019-01-16 ENCOUNTER — ANTI-COAG VISIT (OUTPATIENT)
Dept: FAMILY MEDICINE CLINIC | Facility: CLINIC | Age: 79
End: 2019-01-16
Payer: MEDICARE

## 2019-01-16 DIAGNOSIS — Z79.01 LONG TERM (CURRENT) USE OF ANTICOAGULANTS: ICD-10-CM

## 2019-01-16 DIAGNOSIS — I48.21 PERMANENT ATRIAL FIBRILLATION (HCC): ICD-10-CM

## 2019-01-16 LAB — INR: 2.8 (ref 0.8–1.2)

## 2019-01-16 PROCEDURE — 93793 ANTICOAG MGMT PT WARFARIN: CPT | Performed by: FAMILY MEDICINE

## 2019-01-16 PROCEDURE — 85610 PROTHROMBIN TIME: CPT | Performed by: FAMILY MEDICINE

## 2019-01-16 NOTE — PROGRESS NOTES
Here for INR check in coumadin clinic in the office.     CURRENT COUMADIN DOSE:  8mg M,F and 7mg daily the rest of the week     CHANGES OR COMPLAINTS:  Started pepcid a couple weeks ago    INR RESULTS TODAY:  2.8 ( in goal)    PLAN:  CPM coumadin   Next INR

## 2019-01-23 ENCOUNTER — TELEPHONE (OUTPATIENT)
Dept: FAMILY MEDICINE CLINIC | Facility: CLINIC | Age: 79
End: 2019-01-23

## 2019-01-23 RX ORDER — LISINOPRIL 5 MG/1
5 TABLET ORAL DAILY
Qty: 90 TABLET | Refills: 1 | Status: SHIPPED | OUTPATIENT
Start: 2019-01-23 | End: 2019-07-24

## 2019-01-23 NOTE — TELEPHONE ENCOUNTER
RF request from [facility] for Lisinopril 5 MG #90. Please advise. Future appt: Your appointments     Date & Time Appointment Department [facility])    Feb 13, 2019  9:30 AM CST Anti-Coag with EMG [address] [place] [address],[address] ([facility])        [address] [place] [address], [address] [place] [address] [place]  Amari Mckeon [id number]  [phone]        Last Appointment:  12/28/2018; No f/u recommended    Cholesterol, Total (mg/dL)   Date Value   10/24/2018 186     HDL Cholesterol (mg/dL)   Date Value   10/24/2018 35 (L)     LDL Cholesterol (mg/dL)   Date Value   10/24/2018 122 (H)     Triglycerides (mg/dL)   Date Value   10/24/2018 146     No results found for: EAG, A1C  Lab Results   Component Value Date    TSH 3.970 10/24/2018     Last RF:  10/30/2018    No Follow-up on file.

## 2019-01-24 NOTE — TELEPHONE ENCOUNTER
Please advise refills of Clopidogrel 75mg. Last Rx: 10/30/18    Future appt:     Your appointments     Date & Time Appointment Department [facility])    Feb 13, 2019  9:30 AM CST Anti-Coag with EMG [address] [place] [address],[address], [facility] (

## 2019-01-25 RX ORDER — WARFARIN SODIUM 6 MG/1
TABLET ORAL
Qty: 90 TABLET | Refills: 0 | Status: SHIPPED | OUTPATIENT
Start: 2019-01-25 | End: 2019-02-20 | Stop reason: DRUGHIGH

## 2019-01-25 RX ORDER — CLOPIDOGREL BISULFATE 75 MG/1
75 TABLET ORAL DAILY
Qty: 90 TABLET | Refills: 0 | Status: SHIPPED | OUTPATIENT
Start: 2019-01-25 | End: 2019-02-20 | Stop reason: ALTCHOICE

## 2019-02-04 ENCOUNTER — TELEPHONE (OUTPATIENT)
Dept: FAMILY MEDICINE CLINIC | Facility: CLINIC | Age: 79
End: 2019-02-04

## 2019-02-04 NOTE — TELEPHONE ENCOUNTER
Lab orders already on file dated 10/30/18-  TSH, UA, CBC, Lipid, and CMP were ordered. Pt informed.     Future Appointments   Date Time Provider Cora Palafox   2/13/2019  9:30 AM EMG COUMADIN NURSE [facility] [place] [facility] [place]   2/18/2019  8:45 AM REF [facility]

## 2019-02-08 ENCOUNTER — TELEPHONE (OUTPATIENT)
Dept: FAMILY MEDICINE CLINIC | Facility: CLINIC | Age: 79
End: 2019-02-08

## 2019-02-08 DIAGNOSIS — R92.8 ABNORMAL MAMMOGRAM OF LEFT BREAST: Primary | ICD-10-CM

## 2019-02-08 NOTE — TELEPHONE ENCOUNTER
Bilateral Screening Mammogram dated 2/7/19- Impression: Incomplete- Needs additional imaging   \"the multiple calcifications in the left breast are indeterminate. Spot magnification views are recommended. \"    Left message for pt to call back.

## 2019-02-13 ENCOUNTER — TELEPHONE (OUTPATIENT)
Dept: FAMILY MEDICINE CLINIC | Facility: CLINIC | Age: 79
End: 2019-02-13

## 2019-02-13 ENCOUNTER — ANTI-COAG VISIT (OUTPATIENT)
Dept: FAMILY MEDICINE CLINIC | Facility: CLINIC | Age: 79
End: 2019-02-13
Payer: MEDICARE

## 2019-02-13 DIAGNOSIS — I48.21 PERMANENT ATRIAL FIBRILLATION (HCC): ICD-10-CM

## 2019-02-13 DIAGNOSIS — R92.8 ABNORMAL MAMMOGRAM OF LEFT BREAST: Primary | ICD-10-CM

## 2019-02-13 DIAGNOSIS — Z79.01 LONG TERM (CURRENT) USE OF ANTICOAGULANTS: ICD-10-CM

## 2019-02-13 LAB — INR: 2.7 (ref 0.8–1.2)

## 2019-02-13 PROCEDURE — 93793 ANTICOAG MGMT PT WARFARIN: CPT | Performed by: FAMILY MEDICINE

## 2019-02-13 PROCEDURE — 85610 PROTHROMBIN TIME: CPT | Performed by: FAMILY MEDICINE

## 2019-02-13 NOTE — TELEPHONE ENCOUNTER
Bilateral Screening Mammogram dated 2/7/19- Impression: Incomplete- Needs additional imaging  Unilateral Left Diagnostic Mammogram completed at [facility] on 2/13/19  Impression: Probably Benign  \"the cluster of grouped calcifications in the left breast are pro

## 2019-02-13 NOTE — PROGRESS NOTES
Here for INR check in coumadin clinic in the office. CURRENT COUMADIN DOSE:  8mg M,W,F and 7mg daily the rest of the week     CHANGES OR COMPLAINTS:  Dental extraction on 2/27/18.  Dr. Jerad Garcia ( cardiology managing lovenox bridge)   Epistaxis x 3 over the

## 2019-02-18 ENCOUNTER — LABORATORY ENCOUNTER (OUTPATIENT)
Dept: LAB | Age: 79
End: 2019-02-18
Attending: FAMILY MEDICINE
Payer: MEDICARE

## 2019-02-18 DIAGNOSIS — I10 ESSENTIAL HYPERTENSION, BENIGN: ICD-10-CM

## 2019-02-18 DIAGNOSIS — E78.00 PURE HYPERCHOLESTEROLEMIA: ICD-10-CM

## 2019-02-18 DIAGNOSIS — E78.49 FAMILIAL MULTIPLE LIPOPROTEIN-TYPE HYPERLIPIDEMIA: ICD-10-CM

## 2019-02-18 LAB
ALBUMIN SERPL-MCNC: 3.6 G/DL (ref 3.4–5)
ALBUMIN/GLOB SERPL: 1 {RATIO} (ref 1–2)
ALP LIVER SERPL-CCNC: 72 U/L (ref 55–142)
ALT SERPL-CCNC: 17 U/L (ref 13–56)
ANION GAP SERPL CALC-SCNC: 8 MMOL/L (ref 0–18)
AST SERPL-CCNC: 15 U/L (ref 15–37)
BASOPHILS # BLD AUTO: 0.05 X10(3) UL (ref 0–0.2)
BASOPHILS NFR BLD AUTO: 0.6 %
BILIRUB SERPL-MCNC: 0.8 MG/DL (ref 0.1–2)
BILIRUB UR QL STRIP.AUTO: NEGATIVE
BUN BLD-MCNC: 21 MG/DL (ref 7–18)
BUN/CREAT SERPL: 30 (ref 10–20)
CALCIUM BLD-MCNC: 8.6 MG/DL (ref 8.5–10.1)
CHLORIDE SERPL-SCNC: 106 MMOL/L (ref 98–107)
CHOLEST SMN-MCNC: 154 MG/DL (ref ?–200)
CLARITY UR REFRACT.AUTO: CLEAR
CO2 SERPL-SCNC: 28 MMOL/L (ref 21–32)
COLOR UR AUTO: YELLOW
CREAT BLD-MCNC: 0.7 MG/DL (ref 0.55–1.02)
DEPRECATED RDW RBC AUTO: 39.6 FL (ref 35.1–46.3)
EOSINOPHIL # BLD AUTO: 0.27 X10(3) UL (ref 0–0.7)
EOSINOPHIL NFR BLD AUTO: 3.4 %
ERYTHROCYTE [DISTWIDTH] IN BLOOD BY AUTOMATED COUNT: 12.2 % (ref 11–15)
GLOBULIN PLAS-MCNC: 3.6 G/DL (ref 2.8–4.4)
GLUCOSE BLD-MCNC: 102 MG/DL (ref 70–99)
GLUCOSE UR STRIP.AUTO-MCNC: NEGATIVE MG/DL
HCT VFR BLD AUTO: 41 % (ref 35–48)
HDLC SERPL-MCNC: 36 MG/DL (ref 40–59)
HGB BLD-MCNC: 13.5 G/DL (ref 12–16)
IMM GRANULOCYTES # BLD AUTO: 0.03 X10(3) UL (ref 0–1)
IMM GRANULOCYTES NFR BLD: 0.4 %
KETONES UR STRIP.AUTO-MCNC: NEGATIVE MG/DL
LDLC SERPL CALC-MCNC: 96 MG/DL (ref ?–100)
LYMPHOCYTES # BLD AUTO: 1.32 X10(3) UL (ref 1–4)
LYMPHOCYTES NFR BLD AUTO: 16.5 %
M PROTEIN MFR SERPL ELPH: 7.2 G/DL (ref 6.4–8.2)
MCH RBC QN AUTO: 29.3 PG (ref 26–34)
MCHC RBC AUTO-ENTMCNC: 32.9 G/DL (ref 31–37)
MCV RBC AUTO: 89.1 FL (ref 80–100)
MONOCYTES # BLD AUTO: 0.73 X10(3) UL (ref 0.1–1)
MONOCYTES NFR BLD AUTO: 9.1 %
NEUTROPHILS # BLD AUTO: 5.61 X10 (3) UL (ref 1.5–7.7)
NEUTROPHILS # BLD AUTO: 5.61 X10(3) UL (ref 1.5–7.7)
NEUTROPHILS NFR BLD AUTO: 70 %
NITRITE UR QL STRIP.AUTO: NEGATIVE
NONHDLC SERPL-MCNC: 118 MG/DL (ref ?–130)
OSMOLALITY SERPL CALC.SUM OF ELEC: 297 MOSM/KG (ref 275–295)
PH UR STRIP.AUTO: 6 [PH] (ref 4.5–8)
PLATELET # BLD AUTO: 246 10(3)UL (ref 150–450)
POTASSIUM SERPL-SCNC: 4.3 MMOL/L (ref 3.5–5.1)
PROT UR STRIP.AUTO-MCNC: NEGATIVE MG/DL
RBC # BLD AUTO: 4.6 X10(6)UL (ref 3.8–5.3)
SODIUM SERPL-SCNC: 142 MMOL/L (ref 136–145)
SP GR UR STRIP.AUTO: 1.02 (ref 1–1.03)
TRIGL SERPL-MCNC: 108 MG/DL (ref 30–149)
TSI SER-ACNC: 2.54 MIU/ML (ref 0.36–3.74)
UROBILINOGEN UR STRIP.AUTO-MCNC: <2 MG/DL
VLDLC SERPL CALC-MCNC: 22 MG/DL (ref 0–30)
WBC # BLD AUTO: 8 X10(3) UL (ref 4–11)

## 2019-02-18 PROCEDURE — 80053 COMPREHEN METABOLIC PANEL: CPT

## 2019-02-18 PROCEDURE — 84443 ASSAY THYROID STIM HORMONE: CPT

## 2019-02-18 PROCEDURE — 85025 COMPLETE CBC W/AUTO DIFF WBC: CPT

## 2019-02-18 PROCEDURE — 87086 URINE CULTURE/COLONY COUNT: CPT

## 2019-02-18 PROCEDURE — 81001 URINALYSIS AUTO W/SCOPE: CPT

## 2019-02-18 PROCEDURE — 36415 COLL VENOUS BLD VENIPUNCTURE: CPT

## 2019-02-18 PROCEDURE — 80061 LIPID PANEL: CPT

## 2019-02-20 ENCOUNTER — TELEPHONE (OUTPATIENT)
Dept: FAMILY MEDICINE CLINIC | Facility: CLINIC | Age: 79
End: 2019-02-20

## 2019-02-20 ENCOUNTER — OFFICE VISIT (OUTPATIENT)
Dept: FAMILY MEDICINE CLINIC | Facility: CLINIC | Age: 79
End: 2019-02-20
Payer: MEDICARE

## 2019-02-20 VITALS
HEIGHT: 60 IN | HEART RATE: 80 BPM | RESPIRATION RATE: 20 BRPM | SYSTOLIC BLOOD PRESSURE: 118 MMHG | OXYGEN SATURATION: 98 % | WEIGHT: 227.63 LBS | TEMPERATURE: 98 F | BODY MASS INDEX: 44.69 KG/M2 | DIASTOLIC BLOOD PRESSURE: 62 MMHG

## 2019-02-20 DIAGNOSIS — I71.4 ABDOMINAL ANEURYSM (HCC): ICD-10-CM

## 2019-02-20 DIAGNOSIS — Z13.31 DEPRESSION SCREENING: ICD-10-CM

## 2019-02-20 DIAGNOSIS — I10 ESSENTIAL HYPERTENSION, BENIGN: Primary | ICD-10-CM

## 2019-02-20 DIAGNOSIS — Z00.00 ENCOUNTER FOR ANNUAL HEALTH EXAMINATION: ICD-10-CM

## 2019-02-20 DIAGNOSIS — E78.49 FAMILIAL MULTIPLE LIPOPROTEIN-TYPE HYPERLIPIDEMIA: ICD-10-CM

## 2019-02-20 PROBLEM — K21.00 GASTROESOPHAGEAL REFLUX DISEASE WITH ESOPHAGITIS: Status: RESOLVED | Noted: 2017-11-22 | Resolved: 2019-02-20

## 2019-02-20 PROBLEM — J40 BRONCHITIS: Status: RESOLVED | Noted: 2018-12-28 | Resolved: 2019-02-20

## 2019-02-20 PROCEDURE — G0444 DEPRESSION SCREEN ANNUAL: HCPCS | Performed by: FAMILY MEDICINE

## 2019-02-20 PROCEDURE — G0439 PPPS, SUBSEQ VISIT: HCPCS | Performed by: FAMILY MEDICINE

## 2019-02-20 RX ORDER — WARFARIN SODIUM 1 MG/1
TABLET ORAL
COMMUNITY
End: 2019-05-29 | Stop reason: DRUGHIGH

## 2019-02-20 RX ORDER — WARFARIN SODIUM 6 MG/1
6 TABLET ORAL DAILY
COMMUNITY
End: 2019-05-17

## 2019-02-20 RX ORDER — FAMOTIDINE 20 MG/1
20 TABLET ORAL 2 TIMES DAILY
Refills: 0 | COMMUNITY
Start: 2019-02-20 | End: 2019-11-19

## 2019-02-20 NOTE — PROGRESS NOTES
CC: Annual Physical Exam    HPI:   Valentina Pradhan is a 69year old female who presents for a complete physical exam.   Pt not watching diet,   Energy ok  Nap daily-- saw DR. Joyce Montoya -- did not get f.u    Pt sees cardiology-DR. Ceja--saw last month, of medications?: Yes    Hearing Problems?: No     Functional Status     Hearing Problems?: No    Vision Problems? : No    Difficulty walking?: No    Difficulty dressing or bathing?: No    Problems with daily activities? : No    Memory Problems?: No      Fall/ A/G Ratio 1.0 1.0 - 2.0   LIPID PANEL   Result Value Ref Range    Cholesterol, Total 154 <200 mg/dL    HDL Cholesterol 36 (L) 40 - 59 mg/dL    Triglycerides 108 30 - 149 mg/dL    LDL Cholesterol 96 <100 mg/dL    VLDL 22 0 - 30 mg/dL    Non HDL Chol 118 <13 Granulocyte Absolute 0.03 0.00 - 1.00 x10(3) uL    Neutrophil % 70.0 %    Lymphocyte % 16.5 %    Monocyte % 9.1 %    Eosinophil % 3.4 %    Basophil % 0.6 %    Immature Granulocyte % 0.4 %         Current Outpatient Medications:  aspirin 81 MG Oral Tab Take fibrillation ([address]    • Bronchitis 12/28/2018   • Essential hypertension    • Gastroesophageal reflux disease with esophagitis 11/22/2017   • Hyperlipidemia    • Myocardial infarction [facility])       Past Surgical History:   Procedure Laterality Date   • NANCY weakness, muscle aches, back pain, joint pain, swelling or stiffness. NEUROLOGICAL:  Denies headache, seizures, dizziness, syncope, paralysis, ataxia, numbness or tingling in the extremities,change in bowel or bladder control.   HEMATOLOGIC:  Denies anemia EXTREMITIES:  No edema, no cyanosis, no clubbing, FROM, 2+ dorsalis pedis pulses bilaterally. NEURO:  No deficit, normal gait, strength and tone, sensory intact, normal reflexes  PSYCH:  Normal mood and affect.  Behavior is normal. Judgement and thought any two of the following:   • Overweight (BMI ³25 but <30)   • Family history of diabetes   • Age 72 years or older   • History of gestational diabetes or birth of baby weighing more than 9 pounds     Covered at least every 3 years,         Glucose (mg/dL) a referral   Bone Density Screening      Bone density screening   Covered every 2 yrs after age 72    Covered yearly for Long term Glucocorticoid medication (Steroids) Requires diagnosis related to osteoporosis or estrogen deficiency.    Biennial benefit un but Medicare does not cover unless Medically needed    Zoster (Not covered by Medicare Part B) No orders found for this or any previous visit.  This may be covered with your pharmacy  prescription benefits     Recommended Websites for Advanced Directives

## 2019-02-20 NOTE — TELEPHONE ENCOUNTER
----- Message from Kojo Murillo MD sent at 2/19/2019  6:44 PM CST -----  Labs reviewed  Future Appointments  2/20/2019  1:30 PM    Héctor Dorsey MD     [facility] [place]   [facility]    Labs reviewed- stable, to d/w pt at [facility]

## 2019-02-20 NOTE — PATIENT INSTRUCTIONS
Pt to have sleep consult-- Dr. Brock Calvert-- [facility]-- get at pharmacy of choice    rec ultrasound to eval for abd aneurysm    Continue present meds          Donna Vincent's SCREENING SCHEDULE   Tests on this list are recommended by your 100 cigarettes in their lifetime   • Anyone with a family history    Colorectal Cancer Screening  Covered up to Age 76     Colonoscopy Screen   Covered every 10 years- more often if abnormal Colonoscopy due on 12/27/2012 [facility] if applic 13 (Prevnar)  Covered Once after 65 No orders found for this or any previous visit. Please get once after your 65th birthday    Pneumococcal 23 (Pneumovax)  Covered Once after 65 No orders found for this or any previous visit.  Please get once after your 65

## 2019-02-23 ENCOUNTER — OFFICE VISIT (OUTPATIENT)
Dept: FAMILY MEDICINE CLINIC | Facility: CLINIC | Age: 79
End: 2019-02-23
Payer: MEDICARE

## 2019-02-23 VITALS
OXYGEN SATURATION: 95 % | DIASTOLIC BLOOD PRESSURE: 74 MMHG | SYSTOLIC BLOOD PRESSURE: 112 MMHG | TEMPERATURE: 98 F | HEART RATE: 84 BPM | RESPIRATION RATE: 18 BRPM | WEIGHT: 224 LBS | HEIGHT: 60 IN | BODY MASS INDEX: 43.98 KG/M2

## 2019-02-23 DIAGNOSIS — I21.4 NSTEMI (NON-ST ELEVATED MYOCARDIAL INFARCTION) (HCC): ICD-10-CM

## 2019-02-23 DIAGNOSIS — G45.9 TRANSIENT CEREBRAL ISCHEMIA, UNSPECIFIED TYPE: ICD-10-CM

## 2019-02-23 DIAGNOSIS — G47.33 OSA (OBSTRUCTIVE SLEEP APNEA): Primary | ICD-10-CM

## 2019-02-23 DIAGNOSIS — I48.21 PERMANENT ATRIAL FIBRILLATION (HCC): ICD-10-CM

## 2019-02-23 PROCEDURE — 99214 OFFICE O/P EST MOD 30 MIN: CPT | Performed by: FAMILY MEDICINE

## 2019-02-23 NOTE — PATIENT INSTRUCTIONS
Let Dr. Charlie Rowe know that you have untreated Obstructive sleep apnea. Plan to have a cpap titration in the next 2-3 weeks.      Advised if still with sleep apnea and not using CPAP has a  7 fold increase in risk of heart attack, stroke, abnormal heart rh

## 2019-02-23 NOTE — PROGRESS NOTES
[facility]  SLEEP PROGRESS NOTE        HPI:   This is a 69year old female coming in for Patient presents with: Follow - Up: sleep study      HPI:  Had a sleep study done in June was ordered by DR. Ayesha Hector, and states he [facility]9      Smokeless tobacco: Never Used    Alcohol use:  Yes      Alcohol/week: 0.0 oz      Comment: rare-  0-1 per month    Drug use: No    Family History:  Family History   Problem Relation Age of Onset   • Heart Disorder Mother Hydrocortisone 2.5 % External Lotion Apply 1 Application topically 2 (two) times daily.  Apply sparingly to affected area twice a day as needed Disp: 59 mL Rfl: 0      Counseling given: Not Answered         Problem List:  Patient Active Problem List: large)   Pulse 84   Temp 97.5 °F (36.4 °C) (Temporal)   Resp 18   Ht 60\"   Wt 224 lb   SpO2 95%   BMI 43.75 kg/m²  Estimated body mass index is 43.75 kg/m² as calculated from the following:    Height as of this encounter: 60\".     Weight as of this encoun myocardial infarction) ([facility]  - OP REFERAL TO CPAP TITRATION STUDY  - GENERAL SLEEP STUDY TRANSCRIPTION; Future    4. Transient cerebral ischemia, unspecified type  - OP REFERAL TO CPAP TITRATION STUDY  - GENERAL SLEEP STUDY TRANSCRIPTION;  Future    Patien shall be changed every 1 month,  Hoses every 3 months,   CPAP mask and humidifier  chamber changed every 6 month  with the Durable medical equipment provider.          Meds & Refills for this Visit:  Requested Prescriptions      No prescriptions requested o

## 2019-02-25 ENCOUNTER — TELEPHONE (OUTPATIENT)
Dept: FAMILY MEDICINE CLINIC | Facility: CLINIC | Age: 79
End: 2019-02-25

## 2019-02-25 NOTE — TELEPHONE ENCOUNTER
Your Appointments    Tuesday February 26, 2019  9:30 AM CST  Anti-Coag with [facility] ([facility]) [address] [place] [address],  [address] [phone]   Monday March 04, 2019

## 2019-02-26 ENCOUNTER — ANTI-COAG VISIT (OUTPATIENT)
Dept: FAMILY MEDICINE CLINIC | Facility: CLINIC | Age: 79
End: 2019-02-26
Payer: MEDICARE

## 2019-02-26 DIAGNOSIS — Z79.01 LONG TERM (CURRENT) USE OF ANTICOAGULANTS: ICD-10-CM

## 2019-02-26 DIAGNOSIS — I48.21 PERMANENT ATRIAL FIBRILLATION (HCC): ICD-10-CM

## 2019-02-26 LAB — INR: 1.2 (ref 0.8–1.2)

## 2019-02-26 PROCEDURE — 93793 ANTICOAG MGMT PT WARFARIN: CPT | Performed by: FAMILY MEDICINE

## 2019-02-26 PROCEDURE — 85610 PROTHROMBIN TIME: CPT | Performed by: FAMILY MEDICINE

## 2019-02-26 NOTE — PROGRESS NOTES
Stopped coumadin on 2/22 and started Lovenox on 2/24, 2/25, took 2 injection each day and then 1 injection on 2/26. No more lovenox injection until Thursday, Friday and Saturday 2 injections each day. Next INR is Monday 3/4 at 9:30.     Dental proced

## 2019-03-01 ENCOUNTER — TELEPHONE (OUTPATIENT)
Dept: FAMILY MEDICINE CLINIC | Facility: CLINIC | Age: 79
End: 2019-03-01

## 2019-03-01 NOTE — TELEPHONE ENCOUNTER
Pt was seen on 2/20 per CR. At that time pt was given orders to   Complete US to eval for abd aneurysm. Order faxed to [facility]- pt agreed to call for her appt. Message sent to THE [facility] Referral Department.

## 2019-03-04 ENCOUNTER — TELEPHONE (OUTPATIENT)
Dept: FAMILY MEDICINE CLINIC | Facility: CLINIC | Age: 79
End: 2019-03-04

## 2019-03-04 ENCOUNTER — ANTI-COAG VISIT (OUTPATIENT)
Dept: FAMILY MEDICINE CLINIC | Facility: CLINIC | Age: 79
End: 2019-03-04
Payer: MEDICARE

## 2019-03-04 DIAGNOSIS — I71.4 ABDOMINAL ANEURYSM (HCC): Primary | ICD-10-CM

## 2019-03-04 DIAGNOSIS — Z79.01 LONG TERM (CURRENT) USE OF ANTICOAGULANTS: ICD-10-CM

## 2019-03-04 DIAGNOSIS — I48.21 PERMANENT ATRIAL FIBRILLATION (HCC): ICD-10-CM

## 2019-03-04 LAB — INR: 1.4 (ref 0.8–1.2)

## 2019-03-04 PROCEDURE — 85610 PROTHROMBIN TIME: CPT | Performed by: FAMILY MEDICINE

## 2019-03-04 PROCEDURE — 93793 ANTICOAG MGMT PT WARFARIN: CPT | Performed by: FAMILY MEDICINE

## 2019-03-04 NOTE — PROGRESS NOTES
Here for INR check in coumadin clinic in the office.     CURRENT COUMADIN DOSE:  8mg F and 7mg daily the rest of the week       CHANGES OR COMPLAINTS:  Dental procedure on 2/27/19, Resumed lovenox and coumadin 2/28/19  Per Dr. Marco Zavala instruction lovenox 2/

## 2019-03-04 NOTE — PROGRESS NOTES
Message left for patient to see if she heard from Suzanne Aggarwal, Dr. Richardine Homans nurse and if she received coumadin /lovenox instructions from him? Patient returned call and she has not heard from Dr. Richardine Homans nurse yet.    Patient advised I would try calling Dr. Nicki Montiel

## 2019-03-04 NOTE — TELEPHONE ENCOUNTER
Vascular Abdominal US completed at [facility] on 3/4/19. Impression. \"nonvisualization of aorta (due to large body habitus)-  To consider follow up with CT angiography of abdominal aorta. \"    Pt informed, pt states she was recently seen per cardiology

## 2019-03-04 NOTE — PROGRESS NOTES
Spoke with Robert Gil, at Dr. Ania Ledesma, and Dr. Ana Lilia Weinstein has not gotten back to him yet. Robert Gil states he will call patient with instructions later today after he speaks with Dr. Ana Lilia Weinstein. Patient notified.  Patient to call Dr. Ania Ledesma office before the end of the d

## 2019-03-05 ENCOUNTER — TELEPHONE (OUTPATIENT)
Dept: FAMILY MEDICINE CLINIC | Facility: CLINIC | Age: 79
End: 2019-03-05

## 2019-03-05 NOTE — TELEPHONE ENCOUNTER
Spoke with pt scheduling, pt is scheduled for CTA on Monday. They requesting copy of most recent chem panel. Labs drawn 2/18/19-  [facility] faxed to pt scheduling as requested.

## 2019-03-05 NOTE — PROGRESS NOTES
Spoke with patient this morning and she was advised yesterday by Dr. Amanda Fernandez to increase coumadin to 8mg daily and he have her another script for lovenox twice daily. She is to have an appt for INR tomorrow.

## 2019-03-06 ENCOUNTER — ANTI-COAG VISIT (OUTPATIENT)
Dept: FAMILY MEDICINE CLINIC | Facility: CLINIC | Age: 79
End: 2019-03-06
Payer: MEDICARE

## 2019-03-06 DIAGNOSIS — I48.21 PERMANENT ATRIAL FIBRILLATION (HCC): ICD-10-CM

## 2019-03-06 DIAGNOSIS — Z79.01 LONG TERM (CURRENT) USE OF ANTICOAGULANTS: ICD-10-CM

## 2019-03-06 LAB — INR: 1.9 (ref 0.8–1.2)

## 2019-03-06 PROCEDURE — 85610 PROTHROMBIN TIME: CPT | Performed by: FAMILY MEDICINE

## 2019-03-06 PROCEDURE — 93793 ANTICOAG MGMT PT WARFARIN: CPT | Performed by: FAMILY MEDICINE

## 2019-03-06 NOTE — PROGRESS NOTES
Here for INR check in coumadin clinic in the office.     CURRENT COUMADIN DOSE:  8mg daily     CHANGES OR COMPLAINTS:  Last dose of lovenox this morning    INR RESULTS TODAY:  1.9 ( rising, almost to goal)     PLAN:  Coumadin 8mg daily for 2 more doses  Fri

## 2019-03-13 ENCOUNTER — OFFICE VISIT (OUTPATIENT)
Dept: SLEEP CENTER | Age: 79
End: 2019-03-13
Attending: FAMILY MEDICINE
Payer: MEDICARE

## 2019-03-13 ENCOUNTER — ANTI-COAG VISIT (OUTPATIENT)
Dept: FAMILY MEDICINE CLINIC | Facility: CLINIC | Age: 79
End: 2019-03-13
Payer: MEDICARE

## 2019-03-13 ENCOUNTER — TELEPHONE (OUTPATIENT)
Dept: FAMILY MEDICINE CLINIC | Facility: CLINIC | Age: 79
End: 2019-03-13

## 2019-03-13 DIAGNOSIS — Z79.01 LONG TERM (CURRENT) USE OF ANTICOAGULANTS: ICD-10-CM

## 2019-03-13 DIAGNOSIS — G47.33 OSA (OBSTRUCTIVE SLEEP APNEA): ICD-10-CM

## 2019-03-13 DIAGNOSIS — I48.21 PERMANENT ATRIAL FIBRILLATION (HCC): ICD-10-CM

## 2019-03-13 DIAGNOSIS — G45.9 TRANSIENT CEREBRAL ISCHEMIA, UNSPECIFIED TYPE: ICD-10-CM

## 2019-03-13 DIAGNOSIS — I21.4 NSTEMI (NON-ST ELEVATED MYOCARDIAL INFARCTION) (HCC): ICD-10-CM

## 2019-03-13 LAB — INR: 2.5 (ref 0.8–1.2)

## 2019-03-13 PROCEDURE — 93793 ANTICOAG MGMT PT WARFARIN: CPT | Performed by: FAMILY MEDICINE

## 2019-03-13 PROCEDURE — 85610 PROTHROMBIN TIME: CPT | Performed by: FAMILY MEDICINE

## 2019-03-13 PROCEDURE — 95811 POLYSOM 6/>YRS CPAP 4/> PARM: CPT

## 2019-03-13 NOTE — TELEPHONE ENCOUNTER
CT scan of abdomen and pelvis with IV contrast results reviewed. CT scan done to eval for possible abdominal aortic aneurysm  Findings:  =No evidence of abdominal aortic aneurysm.   Patient with moderate atherosclerotic calcifications in the aorta and its

## 2019-03-13 NOTE — PROGRESS NOTES
Here for INR check in coumadin clinic in the office. CURRENT COUMADIN DOSE:  8mg M,F and 7mg daily the rest of the week     CHANGES OR COMPLAINTS:  Upcoming cortisone injection in back- not expected to be scheduled until next month or so.      INR RESULT

## 2019-03-13 NOTE — TELEPHONE ENCOUNTER
Pt informed, recommendations reviewed. Pt verbalized understanding. Pt was on her way to [place], for her sleep study. Pt asked if she could follow up in 6 months after her next CT would be due. Pt last seen 2/20/19. Please advise.

## 2019-03-14 NOTE — TELEPHONE ENCOUNTER
Pt need to follow up discussed CR- pt was advised to return in May. Pt agreed to call back to get scheduled.

## 2019-03-21 NOTE — PROCEDURES
[address] [address],[facility]       Accredited by the [facility] ([facility]ASM)    PATIENT'S NAME:        Deneise Dakins S  ATTENDING PHYSICIAN:   Mar Mcdonough MD  REFERRING PHYSICIAN:   Too Mcdonough MD  P fibrillation. RECOMMENDATIONS:    1.  Start CPAP at a pressure of 11 with a Respironics DreamWear Pillow small mask. 2.  Recommend overnight oxygenation trend to evaluate for hypoxemia persistent once using CPAP regularly.     3.  Treatment of periodi Patient instructed to follow up with Dr. Leonardo Barber per physician order. Additional Comments: The mask used maintained the seals very well. REM sleep on lateral position were observed.     SLEEP PROFILE        Time (min) %TST Latency From Lights Off (min)   [place] RESPIRATORY EVENT DURATIONS     NREM REM    Average (secs) Maximum (secs) Average (secs) Maximum (secs)   Apnea - - 11.0 11.0   Hypopnea 23.2 25.8 29.1 31.6             LIMB MOVEMENT SUMMARY     Count Index   PLMs 246 46.6   PLMs with Arousals 15 2

## 2019-03-21 NOTE — PROGRESS NOTES
[place] CPAP at 11  Recommend routine follow up to assure compliance and efficacy. Avoid alcohol, sedatives and other cns depressants that may worsen sleep apnea and disrupt normal sleep architecture.   Sleep hygiene should be review to assess factors th

## 2019-03-26 ENCOUNTER — TELEPHONE (OUTPATIENT)
Dept: FAMILY MEDICINE CLINIC | Facility: CLINIC | Age: 79
End: 2019-03-26

## 2019-03-26 DIAGNOSIS — G47.33 OSA (OBSTRUCTIVE SLEEP APNEA): Primary | ICD-10-CM

## 2019-03-26 NOTE — TELEPHONE ENCOUNTER
----- Message from Anton Spencer MD sent at 3/21/2019  1:27 PM CDT -----  [place] CPAP at 11  Recommend routine follow up to assure compliance and efficacy.   Avoid alcohol, sedatives and other cns depressants that may worsen sleep apnea and disrupt lizbeth

## 2019-03-26 NOTE — TELEPHONE ENCOUNTER
Left message for patient to call office.     [facility], 03/26/19, 3:34 PM

## 2019-03-27 ENCOUNTER — ANTI-COAG VISIT (OUTPATIENT)
Dept: FAMILY MEDICINE CLINIC | Facility: CLINIC | Age: 79
End: 2019-03-27
Payer: MEDICARE

## 2019-03-27 DIAGNOSIS — Z79.01 LONG TERM (CURRENT) USE OF ANTICOAGULANTS: ICD-10-CM

## 2019-03-27 DIAGNOSIS — I48.21 PERMANENT ATRIAL FIBRILLATION (HCC): ICD-10-CM

## 2019-03-27 LAB — INR: 2.6 (ref 0.8–1.2)

## 2019-03-27 PROCEDURE — 93793 ANTICOAG MGMT PT WARFARIN: CPT | Performed by: FAMILY MEDICINE

## 2019-03-27 PROCEDURE — 85610 PROTHROMBIN TIME: CPT | Performed by: FAMILY MEDICINE

## 2019-03-27 NOTE — TELEPHONE ENCOUNTER
Patient informed of the below results and recommendations. Patient will contact Kev Henry to get set up on a CPAP machine and then c/b to schedule a f/u appt 2-3 weeks after. Please advise pended order.

## 2019-03-27 NOTE — TELEPHONE ENCOUNTER
Dr. Clara Nolasco is out of the office today. Order signed. Please fax to [facility] and let patient know. Thank you.

## 2019-03-27 NOTE — TELEPHONE ENCOUNTER
Order, OV notes, and sleep studies faxed to [employer].

## 2019-03-27 NOTE — PROGRESS NOTES
Here for INR check in coumadin clinic in the office. CURRENT COUMADIN DOSE:  8mg M,F and 7mg daily the rest of the week     CHANGES OR COMPLAINTS:  Small nose bleed. Ate some cabbage and spinach and had no further nose bleed.   She had not eaten any gree

## 2019-03-29 RX ORDER — METOPROLOL SUCCINATE 50 MG/1
TABLET, EXTENDED RELEASE ORAL
Qty: 180 TABLET | Refills: 2 | Status: SHIPPED | OUTPATIENT
Start: 2019-03-29 | End: 2019-12-20

## 2019-03-29 RX ORDER — POTASSIUM CHLORIDE 750 MG/1
TABLET, EXTENDED RELEASE ORAL
Qty: 90 TABLET | Refills: 2 | Status: SHIPPED | OUTPATIENT
Start: 2019-03-29 | End: 2019-12-20

## 2019-03-29 NOTE — TELEPHONE ENCOUNTER
Future appt:     Your appointments     Date & Time Appointment Department [facility])    Apr 24, 2019  9:30 AM CDT Anti-Coag with [facility] [address] [place] [address],[address], [place] ([place])        [address] [place] [address]

## 2019-04-05 NOTE — MR AVS SNAPSHOT
[address]  [address] [id number] [phone]               Thank you for choosing us for your health care visit with [facility] COUMADIN NURSE.   We are glad to serve you and happy to provide you with this summary of 8 mg   See details      21      8 mg         22      8 mg           23      8 mg         24      8 mg         25      8 mg         26      8 mg         27      8 mg         28      8 mg         29      8 mg           30      8 mg                Date Detail How to take your warfarin dose     To take:  8 mg Take one of the 6 mg tablets and two of the 1 mg tablets.              Description          New coumadin dose: CPM 8mg daily                    Today's Vital Signs     BP Pulse Temp             122/70 mmHg 8 not sign up before the expiration date, you must request a new code. Your unique [employer] Access Code: [email or web address]  Expires: 6/19/2017  9:30 AM    If you have questions, you can call [phone] to talk to our [facility] Staff.  Remember, [employer] : Yes

## 2019-04-21 NOTE — PROGRESS NOTES
Here for INR check in coumadin clinic in the office. CURRENT COUMADIN DOSE:  7mg daily     CHANGES OR COMPLAINTS:  No changes    INR RESULTS TODAY:  2.6 ( in goal)     PLAN:  CPM coumadin   Next INR due in one month. Appointment scheduled.      KARELY andres No

## 2019-04-24 ENCOUNTER — ANTI-COAG VISIT (OUTPATIENT)
Dept: FAMILY MEDICINE CLINIC | Facility: CLINIC | Age: 79
End: 2019-04-24
Payer: MEDICARE

## 2019-04-24 DIAGNOSIS — I48.21 PERMANENT ATRIAL FIBRILLATION (HCC): ICD-10-CM

## 2019-04-24 DIAGNOSIS — Z79.01 LONG TERM (CURRENT) USE OF ANTICOAGULANTS: ICD-10-CM

## 2019-04-24 PROCEDURE — 93793 ANTICOAG MGMT PT WARFARIN: CPT | Performed by: FAMILY MEDICINE

## 2019-04-24 PROCEDURE — 85610 PROTHROMBIN TIME: CPT | Performed by: FAMILY MEDICINE

## 2019-04-29 ENCOUNTER — TELEPHONE (OUTPATIENT)
Dept: FAMILY MEDICINE CLINIC | Facility: CLINIC | Age: 79
End: 2019-04-29

## 2019-04-29 NOTE — TELEPHONE ENCOUNTER
Left message for pt to call the office. Per Dr. Brook Jeffries  -     Pt was set-up with CPAP machine last week and needs a f/u appt with her in 31-90 days after set-up. Please schedule appt when pt returns call.

## 2019-04-30 ENCOUNTER — TELEPHONE (OUTPATIENT)
Dept: FAMILY MEDICINE CLINIC | Facility: CLINIC | Age: 79
End: 2019-04-30

## 2019-04-30 NOTE — TELEPHONE ENCOUNTER
CR received report from [facility] PT- note dated 19. Per CR original order from Dec- now . Pt needs appt.   Left message for pt

## 2019-04-30 NOTE — TELEPHONE ENCOUNTER
Future Appointments   Date Time Provider Cora Vivienne   5/22/2019  9:45 AM [facility] COUMADIN NURSE [facility] [place] [facility] [place]   6/17/2019  2:40 PM Wilian Willis MD [facility] [place] [facility] [place]

## 2019-05-01 NOTE — TELEPHONE ENCOUNTER
Pt called back, pt states she is not looking for updated PT order at this time. Pt understands she is due for appt in May- pt agreed to call back to get scheduled. Pt asked to have CT report from March faxed to Dr. Amanda Fernandez and DR. Sole Huizar.   Faxed as requ

## 2019-05-08 RX ORDER — WARFARIN SODIUM 6 MG/1
TABLET ORAL
Qty: 90 TABLET | Refills: 3 | Status: SHIPPED | OUTPATIENT
Start: 2019-05-08 | End: 2019-05-29 | Stop reason: DRUGHIGH

## 2019-05-08 NOTE — TELEPHONE ENCOUNTER
[facility] [place]in 6 MG #90. Please advise. Future appt: Your appointments     Date & Time Appointment Department [facility])    May 22, 2019  9:45 AM CDT Anti-Coag with [facility] [address],[facility], [place] ([facility])        Jun 17, 2019  2:40 PM CDT Sleep Follow Up with Taylor Ibrahim MD [address] [place] [address])            [address] [place] [address]  [address] [id number] [phone]        Last Appointment:  2/20/2019; No f/u recommended    Cholesterol, Total (mg/dL)   Date Value   02/18/2019 154     HDL Cholesterol (mg/dL)   Date Value   02/18/2019 36 (L)     LDL Cholesterol (mg/dL)   Date Value   02/18/2019 96     Triglycerides (mg/dL)   Date Value   02/18/2019 108     No results found for: EAG, A1C  Lab Results   Component Value Date    TSH 2.540 02/18/2019     No follow-ups on file.

## 2019-05-13 ENCOUNTER — TELEPHONE (OUTPATIENT)
Dept: FAMILY MEDICINE CLINIC | Facility: CLINIC | Age: 79
End: 2019-05-13

## 2019-05-13 NOTE — TELEPHONE ENCOUNTER
See telephone encounter from 3/13/19. Pt was advised at that time to f/u in May.   Pt scheduled appt for med f/u    Future Appointments   Date Time Provider Cora Palafox   5/22/2019  9:45 AM [facility] COUMADIN NURSE [facility] [place] [facility] [place]   5/29/2019  2:

## 2019-05-17 ENCOUNTER — TELEPHONE (OUTPATIENT)
Dept: FAMILY MEDICINE CLINIC | Facility: CLINIC | Age: 79
End: 2019-05-17

## 2019-05-17 NOTE — TELEPHONE ENCOUNTER
Confirmed current dose of 8mg M,F and 7mg daily the rest of the week. ( patient has a 6mg tablet and a 1mg tablet. She takes one 6mg tablet daily and TWO 1mg tablets on M,F and ONE 1mg Sun,Tu,Wed,Th,Sa. Jonel Moncada at [facility] notified.

## 2019-05-20 ENCOUNTER — OFFICE VISIT (OUTPATIENT)
Dept: FAMILY MEDICINE CLINIC | Facility: CLINIC | Age: 79
End: 2019-05-20
Payer: MEDICARE

## 2019-05-20 VITALS
WEIGHT: 226 LBS | OXYGEN SATURATION: 95 % | HEART RATE: 80 BPM | HEIGHT: 60 IN | DIASTOLIC BLOOD PRESSURE: 70 MMHG | BODY MASS INDEX: 44.37 KG/M2 | SYSTOLIC BLOOD PRESSURE: 132 MMHG | TEMPERATURE: 99 F

## 2019-05-20 DIAGNOSIS — J40 BRONCHITIS: Primary | ICD-10-CM

## 2019-05-20 PROCEDURE — 99214 OFFICE O/P EST MOD 30 MIN: CPT | Performed by: NURSE PRACTITIONER

## 2019-05-20 RX ORDER — ALBUTEROL SULFATE 2.5 MG/3ML
2.5 SOLUTION RESPIRATORY (INHALATION) EVERY 6 HOURS PRN
Qty: 50 VIAL | Refills: 1 | Status: SHIPPED | OUTPATIENT
Start: 2019-05-20 | End: 2019-06-03

## 2019-05-20 RX ORDER — AZITHROMYCIN 250 MG/1
TABLET, FILM COATED ORAL
Qty: 6 TABLET | Refills: 0 | Status: SHIPPED | OUTPATIENT
Start: 2019-05-20 | End: 2019-05-29 | Stop reason: ALTCHOICE

## 2019-05-20 NOTE — PROGRESS NOTES
CHIEF COMPLAINT:   Patient presents with:  Chest Congestion  Cough      HPI:   Valentina Pradhan is a 67year old female who presents to clinic today with complaints of feeling ill for a week - coughing- productive today- is green   Fever  (5/11)Satur Spacer/Aero-Holding Chambers (AEROCHAMBER MINI CHAMBER) Does not apply Device Use as directed with proair Disp: 1 Device Rfl: 0   Cholecalciferol (VITAMIN D) 2000 units Oral Cap Take 2,000 Units by mouth daily.  Disp:  Rfl:    LOVASTATIN 20 MG Oral Tab TA Occasional, harsh, bronchial cough. Lungs are otherwise clear to auscultation bilaterally, no wheezes or rhonchi. Breathing is non labored.   CARDIO: RRR without murmur  SKIN: no rashes,no suspicious lesions  ASSESSMENT AND PLAN:   Elvin Meyer is

## 2019-05-20 NOTE — PATIENT INSTRUCTIONS
Rest, increase fluids, salt water gargles ,neti pot (use distilled water) or saline nasal spray, Mucinex-DM, Tylenol/Ibuprofen, follow up if symptoms persist or increase. Follow up for INR.

## 2019-05-21 ENCOUNTER — TELEPHONE (OUTPATIENT)
Dept: FAMILY MEDICINE CLINIC | Facility: CLINIC | Age: 79
End: 2019-05-21

## 2019-05-21 DIAGNOSIS — J40 BRONCHITIS: Primary | ICD-10-CM

## 2019-05-22 ENCOUNTER — ANTI-COAG VISIT (OUTPATIENT)
Dept: FAMILY MEDICINE CLINIC | Facility: CLINIC | Age: 79
End: 2019-05-22
Payer: MEDICARE

## 2019-05-22 DIAGNOSIS — I48.21 PERMANENT ATRIAL FIBRILLATION (HCC): ICD-10-CM

## 2019-05-22 DIAGNOSIS — Z79.01 LONG TERM (CURRENT) USE OF ANTICOAGULANTS: ICD-10-CM

## 2019-05-22 PROCEDURE — 93793 ANTICOAG MGMT PT WARFARIN: CPT | Performed by: FAMILY MEDICINE

## 2019-05-22 PROCEDURE — 85610 PROTHROMBIN TIME: CPT | Performed by: FAMILY MEDICINE

## 2019-05-22 NOTE — PROGRESS NOTES
Here for INR check in coumadin clinic in the office.     CURRENT COUMADIN DOSE:  8mg M,F and 7mg daily the rest of the week     CHANGES OR COMPLAINTS:  Devoria Shock for darwin    INR RESULTS TODAY:  3.5 ( above goal)     PLAN:  Hold coumadin today  Tomorrow s

## 2019-05-23 ENCOUNTER — OFFICE VISIT (OUTPATIENT)
Dept: FAMILY MEDICINE CLINIC | Facility: CLINIC | Age: 79
End: 2019-05-23
Payer: MEDICARE

## 2019-05-23 VITALS
TEMPERATURE: 98 F | SYSTOLIC BLOOD PRESSURE: 134 MMHG | OXYGEN SATURATION: 97 % | HEIGHT: 60 IN | WEIGHT: 226 LBS | DIASTOLIC BLOOD PRESSURE: 78 MMHG | BODY MASS INDEX: 44.37 KG/M2 | HEART RATE: 93 BPM

## 2019-05-23 DIAGNOSIS — J40 BRONCHITIS: Primary | ICD-10-CM

## 2019-05-23 PROCEDURE — 99214 OFFICE O/P EST MOD 30 MIN: CPT | Performed by: NURSE PRACTITIONER

## 2019-05-23 RX ORDER — AMOXICILLIN AND CLAVULANATE POTASSIUM 875; 125 MG/1; MG/1
1 TABLET, FILM COATED ORAL 2 TIMES DAILY
Qty: 20 TABLET | Refills: 0 | Status: SHIPPED | OUTPATIENT
Start: 2019-05-23 | End: 2019-06-02

## 2019-05-23 RX ORDER — METHYLPREDNISOLONE 4 MG/1
TABLET ORAL
Qty: 1 KIT | Refills: 0 | Status: SHIPPED | OUTPATIENT
Start: 2019-05-23 | End: 2019-05-29 | Stop reason: ALTCHOICE

## 2019-05-23 NOTE — PATIENT INSTRUCTIONS
Rest, increase fluids, salt water gargles ,beto pot (use distilled water) or saline nasal spray, Mucinex-DM, Tylenol/Ibuprofen, follow up if symptoms persist or increase. Finish z-pack-  Add Augmentin twice a day -  With food.        Fill Prescriptio

## 2019-05-23 NOTE — PROGRESS NOTES
CHIEF COMPLAINT:   Patient presents with:  Cough      HPI:   Danielle Serrano is a 67year old female who presents to clinic today with complaints of coughing. Wheezing   Thinks she may be a little better- used nebulizer.    No fever   Occasional has (3) MG/3ML Inhalation Solution Inhale 3 mL into the lungs every 4 (four) hours as needed.  Disp:  Rfl:    Spacer/Aero-Holding Chambers (AEROCHAMBER MINI CHAMBER) Does not apply Device Use as directed with proair Disp: 1 Device Rfl: 0   Cholecalciferol (DEEPALI Occasional rhonchi–harsh, bronchial cough. Breathing is non labored.   CARDIO: RRR without murmur  SKIN: no rashes,no suspicious lesions  ASSESSMENT AND PLAN:   Estela Contreras is a 67year old female who presents with ear problems symptoms are consist

## 2019-05-25 ENCOUNTER — ANTI-COAG VISIT (OUTPATIENT)
Dept: FAMILY MEDICINE CLINIC | Facility: CLINIC | Age: 79
End: 2019-05-25
Payer: MEDICARE

## 2019-05-25 DIAGNOSIS — I48.21 PERMANENT ATRIAL FIBRILLATION (HCC): ICD-10-CM

## 2019-05-25 DIAGNOSIS — Z79.01 LONG TERM (CURRENT) USE OF ANTICOAGULANTS: ICD-10-CM

## 2019-05-25 PROCEDURE — 93793 ANTICOAG MGMT PT WARFARIN: CPT | Performed by: FAMILY MEDICINE

## 2019-05-25 PROCEDURE — 85610 PROTHROMBIN TIME: CPT | Performed by: FAMILY MEDICINE

## 2019-05-25 NOTE — PROGRESS NOTES
Here for INR check in coumadin clinic in the office. CURRENT COUMADIN DOSE:  Held one day, then 6mg daily     CHANGES OR COMPLAINTS:  Taking Augmentin    INR RESULTS TODAY:  2.2 ( in goal now)     PLAN:  Coumadin 6mg daily   Next INR in 3 days.    Appoin

## 2019-05-28 ENCOUNTER — ANTI-COAG VISIT (OUTPATIENT)
Dept: FAMILY MEDICINE CLINIC | Facility: CLINIC | Age: 79
End: 2019-05-28
Payer: MEDICARE

## 2019-05-28 DIAGNOSIS — I48.21 PERMANENT ATRIAL FIBRILLATION (HCC): ICD-10-CM

## 2019-05-28 DIAGNOSIS — Z79.01 LONG TERM (CURRENT) USE OF ANTICOAGULANTS: ICD-10-CM

## 2019-05-28 PROCEDURE — 93793 ANTICOAG MGMT PT WARFARIN: CPT | Performed by: FAMILY MEDICINE

## 2019-05-28 PROCEDURE — 85610 PROTHROMBIN TIME: CPT | Performed by: FAMILY MEDICINE

## 2019-05-28 NOTE — PROGRESS NOTES
Here for INR check in coumadin clinic in the office.     CURRENT COUMADIN DOSE:  6mg daily     CHANGES OR COMPLAINTS:  Antibiotic for 6 more days    INR RESULTS TODAY:  2.7 ( in goal, but climbing up quickly)     PLAN:  Coumadin 4mg Tu, Sat and 6mg daily th

## 2019-05-29 ENCOUNTER — OFFICE VISIT (OUTPATIENT)
Dept: FAMILY MEDICINE CLINIC | Facility: CLINIC | Age: 79
End: 2019-05-29
Payer: MEDICARE

## 2019-05-29 VITALS
OXYGEN SATURATION: 98 % | WEIGHT: 231.63 LBS | RESPIRATION RATE: 20 BRPM | TEMPERATURE: 98 F | DIASTOLIC BLOOD PRESSURE: 60 MMHG | HEART RATE: 86 BPM | BODY MASS INDEX: 45.48 KG/M2 | HEIGHT: 60 IN | SYSTOLIC BLOOD PRESSURE: 116 MMHG

## 2019-05-29 DIAGNOSIS — M48.00 SPINAL STENOSIS, UNSPECIFIED SPINAL REGION: ICD-10-CM

## 2019-05-29 DIAGNOSIS — K21.9 GASTROESOPHAGEAL REFLUX DISEASE WITHOUT ESOPHAGITIS: ICD-10-CM

## 2019-05-29 DIAGNOSIS — Z79.01 LONG TERM (CURRENT) USE OF ANTICOAGULANTS: ICD-10-CM

## 2019-05-29 DIAGNOSIS — I48.21 PERMANENT ATRIAL FIBRILLATION (HCC): Primary | ICD-10-CM

## 2019-05-29 DIAGNOSIS — J40 BRONCHITIS: ICD-10-CM

## 2019-05-29 DIAGNOSIS — E78.49 FAMILIAL MULTIPLE LIPOPROTEIN-TYPE HYPERLIPIDEMIA: ICD-10-CM

## 2019-05-29 DIAGNOSIS — M15.9 PRIMARY OSTEOARTHRITIS INVOLVING MULTIPLE JOINTS: ICD-10-CM

## 2019-05-29 DIAGNOSIS — I10 ESSENTIAL HYPERTENSION, BENIGN: ICD-10-CM

## 2019-05-29 PROCEDURE — 99214 OFFICE O/P EST MOD 30 MIN: CPT | Performed by: FAMILY MEDICINE

## 2019-05-29 RX ORDER — WARFARIN SODIUM 4 MG/1
8 TABLET ORAL
COMMUNITY
End: 2020-01-06

## 2019-05-29 RX ORDER — WARFARIN SODIUM 6 MG/1
TABLET ORAL
COMMUNITY
End: 2020-05-13

## 2019-05-29 NOTE — PATIENT INSTRUCTIONS
rec finish antibiotics    rec CLARITIN , over the counter, daily for 1-2 months    Continue medications    Encourage walking, stretching as able    Pt t see opthomology-- eval of skin lesion    rec fasting labs and f.u 3-4 months

## 2019-05-29 NOTE — PROGRESS NOTES
[facility] [place]  PROGRESS NOTE  Chief Complaint:   Patient presents with:  Medication Follow-Up      HPI:   This is a 67year old female coming in for general medical f.u   pt with recent bronchitis, on antobiotics, wheezing less.  Energy bett of Onset   • Heart Disorder Mother    • Cancer Son    • Heart Disorder Brother    • Heart Disorder Brother    • Heart Disorder Brother    • Stroke Brother      Allergies:    Ambien [Zolpidem]       JESSIE  Benadryl [Diphenhyd*    JIVESNA  Oxycodone Cholecalciferol (VITAMIN D) 2000 units Oral Cap Take 2,000 Units by mouth daily.  Disp:  Rfl:    LOVASTATIN 20 MG Oral Tab TAKE ONE TABLET BY MOUTH EVERY DAY Disp: 90 tablet Rfl: 1   Hydrocortisone 2.5 % External Lotion Apply 1 Application topically 2 (tw 231 lb 9.6 oz. Vital signs reviewed. Appears stated age, well groomed. Physical Exam:  GEN:  Patient is alert, awake and oriented, well developed, well nourished, no apparent distress.   HEENT:  Head:  Normocephalic, atraumatic Eyes: EOMI, PERRLA, no scle Familial multiple lipoprotein-type hyperlipidemia     Obstructive sleep apnea     Osteoarthrosis     Periodic limb movement disorder     Rectocele     Other psoriasis     Osteopenia of spine     Facial basal cell cancer     Class 3 severe obesity due to ex

## 2019-05-30 PROBLEM — J10.00 PNEUMONIA DUE TO INFLUENZA A VIRUS: Status: RESOLVED | Noted: 2018-03-24 | Resolved: 2019-05-30

## 2019-06-03 ENCOUNTER — ANTI-COAG VISIT (OUTPATIENT)
Dept: FAMILY MEDICINE CLINIC | Facility: CLINIC | Age: 79
End: 2019-06-03
Payer: MEDICARE

## 2019-06-03 DIAGNOSIS — Z79.01 LONG TERM (CURRENT) USE OF ANTICOAGULANTS: ICD-10-CM

## 2019-06-03 DIAGNOSIS — I48.21 PERMANENT ATRIAL FIBRILLATION (HCC): ICD-10-CM

## 2019-06-03 PROCEDURE — 93793 ANTICOAG MGMT PT WARFARIN: CPT | Performed by: FAMILY MEDICINE

## 2019-06-03 PROCEDURE — 85610 PROTHROMBIN TIME: CPT | Performed by: FAMILY MEDICINE

## 2019-06-04 RX ORDER — WARFARIN SODIUM 1 MG/1
TABLET ORAL
Qty: 60 TABLET | Refills: 3 | OUTPATIENT
Start: 2019-06-04 | End: 2019-09-11 | Stop reason: DRUGHIGH

## 2019-06-04 NOTE — TELEPHONE ENCOUNTER
Future appt:     Your appointments     Date & Time Appointment Department [facility])    Jun 17, 2019 10:45 AM CDT Anti-Coag with [facility] [address] [facility] [address] [facility] [address], [place] ([place])        Jun 17, 2019  2:40 PM CDT S

## 2019-06-06 ENCOUNTER — TELEPHONE (OUTPATIENT)
Dept: FAMILY MEDICINE CLINIC | Facility: CLINIC | Age: 79
End: 2019-06-06

## 2019-06-17 ENCOUNTER — OFFICE VISIT (OUTPATIENT)
Dept: FAMILY MEDICINE CLINIC | Facility: CLINIC | Age: 79
End: 2019-06-17
Payer: MEDICARE

## 2019-06-17 VITALS
SYSTOLIC BLOOD PRESSURE: 110 MMHG | RESPIRATION RATE: 18 BRPM | DIASTOLIC BLOOD PRESSURE: 62 MMHG | HEART RATE: 88 BPM | BODY MASS INDEX: 45.28 KG/M2 | WEIGHT: 230.63 LBS | OXYGEN SATURATION: 93 % | TEMPERATURE: 98 F | HEIGHT: 60 IN

## 2019-06-17 DIAGNOSIS — G47.33 OBSTRUCTIVE SLEEP APNEA: Primary | ICD-10-CM

## 2019-06-17 DIAGNOSIS — G47.61 PERIODIC LIMB MOVEMENT DISORDER: ICD-10-CM

## 2019-06-17 DIAGNOSIS — G47.19 EXCESSIVE DAYTIME SLEEPINESS: ICD-10-CM

## 2019-06-17 DIAGNOSIS — K21.9 GASTROESOPHAGEAL REFLUX DISEASE, ESOPHAGITIS PRESENCE NOT SPECIFIED: ICD-10-CM

## 2019-06-17 DIAGNOSIS — I48.91 ATRIAL FIBRILLATION, UNSPECIFIED TYPE (HCC): ICD-10-CM

## 2019-06-17 DIAGNOSIS — E66.01 CLASS 3 SEVERE OBESITY DUE TO EXCESS CALORIES WITH SERIOUS COMORBIDITY AND BODY MASS INDEX (BMI) OF 40.0 TO 44.9 IN ADULT (HCC): ICD-10-CM

## 2019-06-17 PROCEDURE — 99214 OFFICE O/P EST MOD 30 MIN: CPT | Performed by: FAMILY MEDICINE

## 2019-06-17 RX ORDER — WARFARIN SODIUM 1 MG/1
1 TABLET ORAL NIGHTLY
Qty: 90 TABLET | Refills: 1 | Status: SHIPPED | OUTPATIENT
Start: 2019-06-17 | End: 2019-09-11 | Stop reason: DRUGHIGH

## 2019-06-17 NOTE — PROGRESS NOTES
[facility]        HPI:   This is a 67year old female coming in for Patient presents with:  Obstructive Sleep Apnea (NUVIA): f/u      HPI: she is a bit better.   She is able to fall asleep more,  And now she is still ge 16.7   PLM INDEX (/hr) - 17.0   [address] [place]   Sleep EFFC (%) - 78.6%   Sleep REM (%) - 18.2%   TOT Sleep TM (min) - 341.5       No results found for: IRON, IRONTOT, TIBC, IRONSAT, TRANSFERRIN, TIBCP, IRONBIND, SAT, SATUR  No results found f taking 2- (1 mg) tablets rest of days with 5 mg tab to equal 7mg Disp: 60 tablet Rfl: 3   Warfarin Sodium 4 MG Oral Tab Take 4 mg by mouth twice a week.  Take 4 mg by mouth twice weekly, and 6 mg the remaining 5 days Disp:  Rfl:    Warfarin Sodium 6 MG Oral colon     Eczema     Gastroesophageal reflux disease     Diaphragmatic hernia     Familial multiple lipoprotein-type hyperlipidemia     Obstructive sleep apnea     Osteoarthrosis     Periodic limb movement disorder     Rectocele     Other psoriasis     Cleave Laura 116/60  05/23/19 : 134/78    Ideal body weight: 100 lb 4.9 oz  Adjusted ideal body weight: 152 lb 6.8 oz    Vital signs reviewed. Physical Exam   Constitutional: She is oriented to person, place, and time. She appears well-developed and well-nourished.  No Instructions   Fasting labs next week. Increase pressure to 12. If labs are normal start requip. Follow up sleep in 6 weeks.          Advised if still with sleep apnea and not using CPAP has a  7 fold increase in risk of heart attack, stroke, abnormal

## 2019-06-17 NOTE — TELEPHONE ENCOUNTER
Future Appointments   Date Time Provider Cora Vivienne   6/17/2019 10:45 AM [facility] COUMADIN NURSE [facility] [place] [facility] [place]   6/17/2019  2:40 PM Nyasia Mcdonough MD [facility] [place] [facility] [place]   9/6/2019  8:00 AM REF [place] REF [facility]   9/11/2019 1

## 2019-06-17 NOTE — PATIENT INSTRUCTIONS
Fasting labs next week. Increase pressure to 12. If labs are normal start requip. Follow up sleep in 6 weeks.

## 2019-06-18 ENCOUNTER — ANTI-COAG VISIT (OUTPATIENT)
Dept: FAMILY MEDICINE CLINIC | Facility: CLINIC | Age: 79
End: 2019-06-18
Payer: MEDICARE

## 2019-06-18 DIAGNOSIS — I48.21 PERMANENT ATRIAL FIBRILLATION (HCC): ICD-10-CM

## 2019-06-18 DIAGNOSIS — Z79.01 LONG TERM (CURRENT) USE OF ANTICOAGULANTS: ICD-10-CM

## 2019-06-18 PROCEDURE — 85610 PROTHROMBIN TIME: CPT | Performed by: FAMILY MEDICINE

## 2019-06-18 PROCEDURE — 93793 ANTICOAG MGMT PT WARFARIN: CPT | Performed by: FAMILY MEDICINE

## 2019-06-18 NOTE — PROGRESS NOTES
Here for INR check in coumadin clinic in the office.     CURRENT COUMADIN DOSE:  8mg M,F and 7mg daily the rest of the week     CHANGES OR COMPLAINTS:  No changes    INR RESULTS TODAY:  2.8 ( in goal)    PLAN:  CPM coumadin   Next INR in one month  Appointm

## 2019-06-19 NOTE — PROGRESS NOTES
Here for INR check in coumadin clinic in the office.     CURRENT COUMADIN DOSE:  4mg Tu, Sa and 6mg daily the rest of the week     CHANGES OR COMPLAINTS:  Finished antibiotic    INR RESULTS TODAY:  2.2 ( in goal)    PLAN:  Resume regular coumadin dose of 8m

## 2019-06-22 ENCOUNTER — APPOINTMENT (OUTPATIENT)
Dept: LAB | Age: 79
End: 2019-06-22
Attending: FAMILY MEDICINE
Payer: MEDICARE

## 2019-06-22 DIAGNOSIS — G47.19 EXCESSIVE DAYTIME SLEEPINESS: ICD-10-CM

## 2019-06-22 DIAGNOSIS — I48.91 ATRIAL FIBRILLATION, UNSPECIFIED TYPE (HCC): ICD-10-CM

## 2019-06-22 DIAGNOSIS — E66.01 CLASS 3 SEVERE OBESITY DUE TO EXCESS CALORIES WITH SERIOUS COMORBIDITY AND BODY MASS INDEX (BMI) OF 40.0 TO 44.9 IN ADULT (HCC): ICD-10-CM

## 2019-06-22 DIAGNOSIS — K21.9 GASTROESOPHAGEAL REFLUX DISEASE, ESOPHAGITIS PRESENCE NOT SPECIFIED: ICD-10-CM

## 2019-06-22 DIAGNOSIS — G47.61 PERIODIC LIMB MOVEMENT DISORDER: ICD-10-CM

## 2019-06-22 PROCEDURE — 83550 IRON BINDING TEST: CPT

## 2019-06-22 PROCEDURE — 82306 VITAMIN D 25 HYDROXY: CPT

## 2019-06-22 PROCEDURE — 82607 VITAMIN B-12: CPT

## 2019-06-22 PROCEDURE — 82728 ASSAY OF FERRITIN: CPT

## 2019-06-22 PROCEDURE — 83735 ASSAY OF MAGNESIUM: CPT

## 2019-06-22 PROCEDURE — 83540 ASSAY OF IRON: CPT

## 2019-06-22 PROCEDURE — 36415 COLL VENOUS BLD VENIPUNCTURE: CPT

## 2019-06-24 ENCOUNTER — TELEPHONE (OUTPATIENT)
Dept: FAMILY MEDICINE CLINIC | Facility: CLINIC | Age: 79
End: 2019-06-24

## 2019-06-24 DIAGNOSIS — E53.8 VITAMIN B12 DEFICIENCY: ICD-10-CM

## 2019-06-24 DIAGNOSIS — E61.1 IRON DEFICIENCY: ICD-10-CM

## 2019-06-24 DIAGNOSIS — G47.61 PERIODIC LIMB MOVEMENT DISORDER: Primary | ICD-10-CM

## 2019-06-24 DIAGNOSIS — E55.9 VITAMIN D DEFICIENCY: ICD-10-CM

## 2019-06-24 DIAGNOSIS — Z79.01 LONG TERM (CURRENT) USE OF ANTICOAGULANTS: ICD-10-CM

## 2019-06-24 DIAGNOSIS — R53.83 FATIGUE, UNSPECIFIED TYPE: ICD-10-CM

## 2019-06-24 NOTE — TELEPHONE ENCOUNTER
Spoke with patient and she was notified of the results and recommendations as per Dr. Bebe Cook. Pt verbalized understanding, chooses to get vitamins OTC at [facility] and will recheck labs when needed.

## 2019-07-16 ENCOUNTER — ANTI-COAG VISIT (OUTPATIENT)
Dept: FAMILY MEDICINE CLINIC | Facility: CLINIC | Age: 79
End: 2019-07-16
Payer: MEDICARE

## 2019-07-16 DIAGNOSIS — I48.21 PERMANENT ATRIAL FIBRILLATION (HCC): ICD-10-CM

## 2019-07-16 DIAGNOSIS — Z79.01 LONG TERM (CURRENT) USE OF ANTICOAGULANTS: ICD-10-CM

## 2019-07-16 LAB — INR: 2.7 (ref 0.8–1.2)

## 2019-07-16 PROCEDURE — 93793 ANTICOAG MGMT PT WARFARIN: CPT | Performed by: FAMILY MEDICINE

## 2019-07-16 PROCEDURE — 85610 PROTHROMBIN TIME: CPT | Performed by: FAMILY MEDICINE

## 2019-07-16 NOTE — PROGRESS NOTES
Here for INR check in coumadin clinic in the office.     CURRENT COUMADIN DOSE:  8mg M,F and 7mg daily the rest of the week     CHANGES OR COMPLAINTS:  No changes    INR RESULTS TODAY:  2.7 (in goal)    PLAN:  CPM coumadin   Next INR in one month  Appointme

## 2019-07-18 ENCOUNTER — TELEPHONE (OUTPATIENT)
Dept: FAMILY MEDICINE CLINIC | Facility: CLINIC | Age: 79
End: 2019-07-18

## 2019-07-18 NOTE — TELEPHONE ENCOUNTER
Pt contacted in regards to questions on medications/vitamins. Pt stated that she has several upcoming appts with Dr. Crispin Navarro and Dr. Millard Primrose. She will discuss further at those appts. No other concerns at this time.

## 2019-07-24 RX ORDER — LISINOPRIL 5 MG/1
5 TABLET ORAL DAILY
Qty: 90 TABLET | Refills: 1 | Status: SHIPPED | OUTPATIENT
Start: 2019-07-24 | End: 2020-01-21

## 2019-07-24 NOTE — TELEPHONE ENCOUNTER
Last refill: 1/23/19    Future appt:     Your appointments     Date & Time Appointment Department [facility])    Aug 15, 2019  9:30 AM CDT Anti-Coag with [facility] [address] [place] [address],[address], [place]ight[place] ([facility])        Aug

## 2019-08-14 ENCOUNTER — ANTI-COAG VISIT (OUTPATIENT)
Dept: FAMILY MEDICINE CLINIC | Facility: CLINIC | Age: 79
End: 2019-08-14
Payer: MEDICARE

## 2019-08-14 DIAGNOSIS — I48.21 PERMANENT ATRIAL FIBRILLATION (HCC): ICD-10-CM

## 2019-08-14 DIAGNOSIS — Z79.01 LONG TERM (CURRENT) USE OF ANTICOAGULANTS: ICD-10-CM

## 2019-08-14 LAB — INR: 2.7 (ref 0.8–1.2)

## 2019-08-14 PROCEDURE — 93793 ANTICOAG MGMT PT WARFARIN: CPT | Performed by: FAMILY MEDICINE

## 2019-08-14 PROCEDURE — 85610 PROTHROMBIN TIME: CPT | Performed by: FAMILY MEDICINE

## 2019-08-14 NOTE — PROGRESS NOTES
Here for INR check in coumadin clinic in the office. CURRENT COUMADIN DOSE:  8mg M,F and 7mg daily the rest of the week     CHANGES OR COMPLAINTS:  Seen in urgent care 4 days ago for hematuria. Positive UTI. Prescribed nitrofurantoin antibiotic.  Will

## 2019-08-30 ENCOUNTER — TELEPHONE (OUTPATIENT)
Dept: FAMILY MEDICINE CLINIC | Facility: CLINIC | Age: 79
End: 2019-08-30

## 2019-08-30 NOTE — TELEPHONE ENCOUNTER
Pt had Bilateral Screening Mammogram dated 2/7/19- Impression: Incomplete- Needs additional imaging  Unilateral Left Diagnostic Mammogram completed at [facility] on 2/13/19  Impression: Probably Benign  6 month follow up was recommended    Ulilateral Left Diagno

## 2019-09-10 ENCOUNTER — OFFICE VISIT (OUTPATIENT)
Dept: FAMILY MEDICINE CLINIC | Facility: CLINIC | Age: 79
End: 2019-09-10
Payer: MEDICARE

## 2019-09-10 VITALS
WEIGHT: 238.38 LBS | TEMPERATURE: 97 F | HEIGHT: 60 IN | SYSTOLIC BLOOD PRESSURE: 114 MMHG | RESPIRATION RATE: 16 BRPM | DIASTOLIC BLOOD PRESSURE: 74 MMHG | OXYGEN SATURATION: 97 % | HEART RATE: 88 BPM | BODY MASS INDEX: 46.8 KG/M2

## 2019-09-10 DIAGNOSIS — G47.33 OBSTRUCTIVE SLEEP APNEA: Primary | ICD-10-CM

## 2019-09-10 PROCEDURE — 99214 OFFICE O/P EST MOD 30 MIN: CPT | Performed by: FAMILY MEDICINE

## 2019-09-10 NOTE — PATIENT INSTRUCTIONS
Trial of dreamware full face mask to control leak. Stop drinking after dinner. Follow up in 3 months.

## 2019-09-10 NOTE — PROGRESS NOTES
[facility]  SLE[facility]ESS [facility]        HPI:   This is a 67year old female coming in for Patient presents with:  Obstructive Sleep Apnea (NUVIA)      HPI:  Still getting up 2-3 times per ngiht to urinate. She drinks water before bed.   She -    Facility - -   Sleep EFFC (%) - -   Sleep REM (%) - -   TOT Sleep TM (min) - -       Lab Results   Component Value Date    IRON 75 06/22/2019    TRANSFERRIN 282 06/22/2019    TIBCP 420 06/22/2019    SAT 18 06/22/2019     Lab Results   Component Valu Warfarin.  Disp: 90 tablet Rfl: 1   Warfarin Sodium 1 MG Oral Tab Pt taking 3- (1 mg) tablets on Monday and Friday with 5 mg tab to equal total 8 mg Pt taking 2- (1 mg) tablets rest of days with 5 mg tab to equal 7mg Disp: 60 tablet Rfl: 3   Warfarin Sodium Candidiasis of skin and nails     Tubular adenoma of colon     Eczema     Gastroesophageal reflux disease     Diaphragmatic hernia     Familial multiple lipoprotein-type hyperlipidemia     Obstructive sleep apnea     Osteoarthrosis     Periodic limb moveme 3 Encounters:  09/10/19 : 114/74  06/17/19 : 110/62  05/29/19 : 116/60    Ideal body weight: 100 lb 4.9 oz  Adjusted ideal body weight: 155 lb 8.7 oz    Vital signs reviewed. Physical Exam   Constitutional: She is oriented to person, place, and time.  She Advised to refrain from driving when sleepy. COMPLIANCE is required by insurance for 4 hours a night most nights of the week. Recommend weight loss, and maintain and optimal BMI with Exercise 30 minutes most days of the week to target heart rate .

## 2019-09-11 ENCOUNTER — OFFICE VISIT (OUTPATIENT)
Dept: FAMILY MEDICINE CLINIC | Facility: CLINIC | Age: 79
End: 2019-09-11
Payer: MEDICARE

## 2019-09-11 ENCOUNTER — ANTI-COAG VISIT (OUTPATIENT)
Dept: FAMILY MEDICINE CLINIC | Facility: CLINIC | Age: 79
End: 2019-09-11
Payer: MEDICARE

## 2019-09-11 VITALS
DIASTOLIC BLOOD PRESSURE: 66 MMHG | SYSTOLIC BLOOD PRESSURE: 122 MMHG | WEIGHT: 240.19 LBS | HEART RATE: 70 BPM | BODY MASS INDEX: 47.16 KG/M2 | RESPIRATION RATE: 16 BRPM | TEMPERATURE: 98 F | OXYGEN SATURATION: 98 % | HEIGHT: 60 IN

## 2019-09-11 DIAGNOSIS — I48.21 PERMANENT ATRIAL FIBRILLATION (HCC): ICD-10-CM

## 2019-09-11 DIAGNOSIS — M48.00 SPINAL STENOSIS, UNSPECIFIED SPINAL REGION: ICD-10-CM

## 2019-09-11 DIAGNOSIS — H90.6 MIXED CONDUCTIVE AND SENSORINEURAL HEARING LOSS OF BOTH EARS: ICD-10-CM

## 2019-09-11 DIAGNOSIS — G47.9 SLEEP DISTURBANCE: ICD-10-CM

## 2019-09-11 DIAGNOSIS — I10 ESSENTIAL HYPERTENSION, BENIGN: Primary | ICD-10-CM

## 2019-09-11 DIAGNOSIS — Z79.01 LONG TERM (CURRENT) USE OF ANTICOAGULANTS: ICD-10-CM

## 2019-09-11 DIAGNOSIS — E78.49 FAMILIAL MULTIPLE LIPOPROTEIN-TYPE HYPERLIPIDEMIA: ICD-10-CM

## 2019-09-11 DIAGNOSIS — I25.10 ATHEROSCLEROSIS OF NATIVE CORONARY ARTERY OF NATIVE HEART WITHOUT ANGINA PECTORIS: ICD-10-CM

## 2019-09-11 PROBLEM — H04.123 DRY EYE SYNDROME OF BILATERAL LACRIMAL GLANDS: Status: ACTIVE | Noted: 2019-09-11

## 2019-09-11 PROBLEM — H35.363 DRUSEN (DEGENERATIVE) OF MACULA, BILATERAL: Status: ACTIVE | Noted: 2019-09-11

## 2019-09-11 PROBLEM — C44.111 BASAL CELL CARCINOMA OF SKIN OF LEFT EYELID, INCLUDING CANTHUS: Status: ACTIVE | Noted: 2019-09-11

## 2019-09-11 PROBLEM — H35.369 MACULAR DRUSEN: Status: ACTIVE | Noted: 2017-10-10

## 2019-09-11 LAB — INR: 3.3 (ref 0.8–1.2)

## 2019-09-11 PROCEDURE — 99213 OFFICE O/P EST LOW 20 MIN: CPT | Performed by: FAMILY MEDICINE

## 2019-09-11 PROCEDURE — 85610 PROTHROMBIN TIME: CPT | Performed by: FAMILY MEDICINE

## 2019-09-11 RX ORDER — WARFARIN SODIUM 1 MG/1
TABLET ORAL
COMMUNITY
End: 2020-03-24

## 2019-09-11 NOTE — PROGRESS NOTES
[address]  PROGRESS NOTE  Chief Complaint:   Patient presents with:   Follow - Up: Review lab results      HPI:   This is a 67year old female coming in for medical f.u     Pt wanting new hearing eval, hearing aids not working as well file      Number of children: Not on file      Years of education: Not on file      Highest education level: Not on file    Occupational History      Occupation: Retired    Tobacco Use      Smoking status: Former Smoker        Quit date: 3/15/1971        Y furosemide 20 MG Oral Tab Take 1 tablet (20 mg total) by mouth daily. Disp: 90 tablet Rfl: 0   ipratropium-albuterol 0.5-2.5 (3) MG/3ML Inhalation Solution Inhale 3 mL into the lungs every 4 (four) hours as needed.  Disp:  Rfl:    Spacer/Aero-Holding [facility] rhinitis.      EXAM:   /66 (BP Location: Left arm, Patient Position: Sitting, Cuff Size: large)   Pulse 70   Temp 97.6 °F (36.4 °C) (Temporal)   Resp 16   Ht 60\"   Wt 240 lb 3.2 oz   SpO2 98%   BMI 46.91 kg/m²  Estimated body mass index is 46.91 kg/m hyperlipidemia     Obstructive sleep apnea     Osteoarthrosis     Periodic limb movement disorder     Rectocele     Other psoriasis     Osteopenia of spine     Facial basal cell cancer     Class 3 severe obesity due to excess calories with serious comorbid

## 2019-09-11 NOTE — PROGRESS NOTES
Here for INR check in coumadin clinic in the office. Office visit with Dr. Tal Noguera next today.      CURRENT COUMADIN DOSE:  8mg M,F and 7mg daily the rest of the week     CHANGES OR COMPLAINTS:  Ate less leafy greens this week  Will travel to [place] n

## 2019-09-11 NOTE — PATIENT INSTRUCTIONS
rec Patient get fasting labs done and return for f.u 1 month    Pt encouraged to return to cardiac rehab    Encourage diet manangement with goal of weight loss    Referral for hearing evaluation.

## 2019-09-17 ENCOUNTER — ANTI-COAG VISIT (OUTPATIENT)
Dept: FAMILY MEDICINE CLINIC | Facility: CLINIC | Age: 79
End: 2019-09-17
Payer: MEDICARE

## 2019-09-17 DIAGNOSIS — I48.21 PERMANENT ATRIAL FIBRILLATION (HCC): ICD-10-CM

## 2019-09-17 DIAGNOSIS — Z79.01 LONG TERM (CURRENT) USE OF ANTICOAGULANTS: ICD-10-CM

## 2019-09-17 LAB — INR: 2.4 (ref 0.8–1.2)

## 2019-09-17 PROCEDURE — 85610 PROTHROMBIN TIME: CPT | Performed by: FAMILY MEDICINE

## 2019-09-17 NOTE — PROGRESS NOTES
Here for INR check in coumadin clinic in the office.     CURRENT COUMADIN DOSE:  Held one day, then resumed 8mg M,F and 7mg daily the rest of the week    CHANGES OR COMPLAINTS:  Resumed regular diet with vegetables  Will travel to [place] this week    INR

## 2019-10-01 ENCOUNTER — ANTI-COAG VISIT (OUTPATIENT)
Dept: FAMILY MEDICINE CLINIC | Facility: CLINIC | Age: 79
End: 2019-10-01
Payer: MEDICARE

## 2019-10-01 ENCOUNTER — LABORATORY ENCOUNTER (OUTPATIENT)
Dept: LAB | Age: 79
End: 2019-10-01
Attending: FAMILY MEDICINE
Payer: MEDICARE

## 2019-10-01 DIAGNOSIS — I10 ESSENTIAL HYPERTENSION, BENIGN: ICD-10-CM

## 2019-10-01 DIAGNOSIS — E78.49 FAMILIAL MULTIPLE LIPOPROTEIN-TYPE HYPERLIPIDEMIA: ICD-10-CM

## 2019-10-01 DIAGNOSIS — E61.1 IRON DEFICIENCY: ICD-10-CM

## 2019-10-01 DIAGNOSIS — Z79.01 LONG TERM (CURRENT) USE OF ANTICOAGULANTS: ICD-10-CM

## 2019-10-01 DIAGNOSIS — G47.61 PERIODIC LIMB MOVEMENT DISORDER: ICD-10-CM

## 2019-10-01 DIAGNOSIS — I48.21 PERMANENT ATRIAL FIBRILLATION (HCC): ICD-10-CM

## 2019-10-01 PROCEDURE — 84439 ASSAY OF FREE THYROXINE: CPT

## 2019-10-01 PROCEDURE — 80053 COMPREHEN METABOLIC PANEL: CPT

## 2019-10-01 PROCEDURE — 82728 ASSAY OF FERRITIN: CPT

## 2019-10-01 PROCEDURE — 84443 ASSAY THYROID STIM HORMONE: CPT

## 2019-10-01 PROCEDURE — 85025 COMPLETE CBC W/AUTO DIFF WBC: CPT

## 2019-10-01 PROCEDURE — 85610 PROTHROMBIN TIME: CPT | Performed by: FAMILY MEDICINE

## 2019-10-01 PROCEDURE — 36415 COLL VENOUS BLD VENIPUNCTURE: CPT

## 2019-10-01 PROCEDURE — 80061 LIPID PANEL: CPT

## 2019-10-01 PROCEDURE — 83550 IRON BINDING TEST: CPT

## 2019-10-01 PROCEDURE — 93793 ANTICOAG MGMT PT WARFARIN: CPT | Performed by: FAMILY MEDICINE

## 2019-10-01 PROCEDURE — 83540 ASSAY OF IRON: CPT

## 2019-10-01 NOTE — PROGRESS NOTES
Here for INR check in coumadin clinic in the office. CURRENT COUMADIN DOSE:  7mg M,F and 8mg daily the rest of the week    CHANGES OR COMPLAINTS:  Upcoming tooth extractions by Dr. Charlie Rowe. Not scheduled. Patient discussed above with Dr. Ana Lilia Weinstein.  She s

## 2019-10-02 ENCOUNTER — TELEPHONE (OUTPATIENT)
Dept: FAMILY MEDICINE CLINIC | Facility: CLINIC | Age: 79
End: 2019-10-02

## 2019-10-02 DIAGNOSIS — E61.1 IRON DEFICIENCY: Primary | ICD-10-CM

## 2019-10-02 NOTE — TELEPHONE ENCOUNTER
----- Message from Rabia Flores MD sent at 10/1/2019  5:24 PM CDT -----  Iron stores are low. [employer]. net  (0528 access number) for vitamins  Iron plus c bid x 3  Months and recheck iron.    This may be contributing to RLS and PLMD.   Goal to arielle

## 2019-10-08 ENCOUNTER — OFFICE VISIT (OUTPATIENT)
Dept: FAMILY MEDICINE CLINIC | Facility: CLINIC | Age: 79
End: 2019-10-08
Payer: MEDICARE

## 2019-10-08 VITALS
HEIGHT: 60 IN | HEART RATE: 78 BPM | WEIGHT: 232 LBS | SYSTOLIC BLOOD PRESSURE: 100 MMHG | DIASTOLIC BLOOD PRESSURE: 66 MMHG | TEMPERATURE: 98 F | BODY MASS INDEX: 45.55 KG/M2 | RESPIRATION RATE: 20 BRPM | OXYGEN SATURATION: 97 %

## 2019-10-08 DIAGNOSIS — B37.2 CANDIDIASIS OF SKIN AND NAILS: ICD-10-CM

## 2019-10-08 DIAGNOSIS — C44.1191 BASAL CELL CARCINOMA (BCC) OF SKIN OF LEFT UPPER EYELID INCLUDING CANTHUS: Primary | ICD-10-CM

## 2019-10-08 DIAGNOSIS — G47.9 SLEEP DISTURBANCE: ICD-10-CM

## 2019-10-08 DIAGNOSIS — I10 ESSENTIAL HYPERTENSION, BENIGN: ICD-10-CM

## 2019-10-08 DIAGNOSIS — H90.6 MIXED CONDUCTIVE AND SENSORINEURAL HEARING LOSS OF BOTH EARS: ICD-10-CM

## 2019-10-08 DIAGNOSIS — Z23 NEED FOR INFLUENZA VACCINATION: ICD-10-CM

## 2019-10-08 DIAGNOSIS — M15.9 PRIMARY OSTEOARTHRITIS INVOLVING MULTIPLE JOINTS: ICD-10-CM

## 2019-10-08 PROCEDURE — G0008 ADMIN INFLUENZA VIRUS VAC: HCPCS | Performed by: FAMILY MEDICINE

## 2019-10-08 PROCEDURE — 99214 OFFICE O/P EST MOD 30 MIN: CPT | Performed by: FAMILY MEDICINE

## 2019-10-08 PROCEDURE — 90662 IIV NO PRSV INCREASED AG IM: CPT | Performed by: FAMILY MEDICINE

## 2019-10-08 RX ORDER — MOMETASONE FUROATE 1 MG/G
CREAM TOPICAL
Qty: 45 G | Refills: 0 | Status: SHIPPED | OUTPATIENT
Start: 2019-10-08 | End: 2021-01-21

## 2019-10-08 NOTE — PATIENT INSTRUCTIONS
rec tylenol and compression gloves for hand arthritis    Labs stable    Continue present medications    F/u sleep in January

## 2019-10-08 NOTE — PROGRESS NOTES
Natali Spivey is a 67year old female.      HPI:   Patient presents for multiple medical concerns    Pt with increase arthritic pain, in hands    Recent labs reviewed    Intermittent antifungal cream and steroid  -- in skin folds    Pt asking for francia furosemide 20 MG Oral Tab Take 1 tablet (20 mg total) by mouth daily. Disp: 90 tablet Rfl: 0   ipratropium-albuterol 0.5-2.5 (3) MG/3ML Inhalation Solution Inhale 3 mL into the lungs every 4 (four) hours as needed.  Disp:  Rfl:    Spacer/Aero-Holding [facility] developed, well nourished,in no apparent distress  SKIN: no rashes,no suspicious lesions  HEENT: atraumatic, normocephalic,ears and throat are clear  NECK: supple,no adenopathy,no bruits  LUNGS: clear to auscultation  CARDIO: RRR without murmur  GI: good B

## 2019-10-30 RX ORDER — WARFARIN SODIUM 1 MG/1
TABLET ORAL
Qty: 90 TABLET | Refills: 1 | Status: SHIPPED | OUTPATIENT
Start: 2019-10-30 | End: 2020-01-06

## 2019-10-30 NOTE — TELEPHONE ENCOUNTER
Future appt:     Your appointments     Date & Time Appointment Department [facility])    Nov 05, 2019  9:30 AM CST Anti-Coag with [facility] [address], [place] ([place])        Jan 07, 2020 10:30 AM CST S

## 2019-10-31 ENCOUNTER — PATIENT OUTREACH (OUTPATIENT)
Dept: CASE MANAGEMENT | Age: 79
End: 2019-10-31

## 2019-11-05 ENCOUNTER — ANTI-COAG VISIT (OUTPATIENT)
Dept: FAMILY MEDICINE CLINIC | Facility: CLINIC | Age: 79
End: 2019-11-05
Payer: MEDICARE

## 2019-11-05 DIAGNOSIS — Z79.01 LONG TERM (CURRENT) USE OF ANTICOAGULANTS: ICD-10-CM

## 2019-11-05 DIAGNOSIS — I48.21 PERMANENT ATRIAL FIBRILLATION (HCC): ICD-10-CM

## 2019-11-05 PROCEDURE — 85610 PROTHROMBIN TIME: CPT | Performed by: FAMILY MEDICINE

## 2019-11-05 NOTE — PROGRESS NOTES
Here for INR check in coumadin clinic in the office.     CURRENT COUMADIN DOSE:  8mg M,F and 7mg daily the rest of the week     CHANGES OR COMPLAINTS:  No changes    INR RESULTS TODAY:  2.6 ( in goal)    PLAN:  CPM coumadin   Next INR due in one month  Appo

## 2019-11-19 RX ORDER — FAMOTIDINE 20 MG/1
20 TABLET ORAL 2 TIMES DAILY
Qty: 60 TABLET | Refills: 3 | Status: SHIPPED | OUTPATIENT
Start: 2019-11-19 | End: 2020-01-17

## 2019-11-19 NOTE — TELEPHONE ENCOUNTER
Future appt:     Your appointments     Date & Time Appointment Department [facility])    Dec 03, 2019  9:30 AM CST Anti-Coag with [facility] [address], [facility] ([facility])        Jan 07, 2020 10:30 AM CST S

## 2019-12-02 ENCOUNTER — PATIENT OUTREACH (OUTPATIENT)
Dept: CASE MANAGEMENT | Age: 79
End: 2019-12-02

## 2019-12-03 ENCOUNTER — ANTI-COAG VISIT (OUTPATIENT)
Dept: FAMILY MEDICINE CLINIC | Facility: CLINIC | Age: 79
End: 2019-12-03
Payer: MEDICARE

## 2019-12-03 DIAGNOSIS — Z79.01 LONG TERM (CURRENT) USE OF ANTICOAGULANTS: ICD-10-CM

## 2019-12-03 DIAGNOSIS — I48.21 PERMANENT ATRIAL FIBRILLATION (HCC): ICD-10-CM

## 2019-12-03 PROCEDURE — 85610 PROTHROMBIN TIME: CPT | Performed by: FAMILY MEDICINE

## 2019-12-03 PROCEDURE — 93793 ANTICOAG MGMT PT WARFARIN: CPT | Performed by: FAMILY MEDICINE

## 2019-12-03 NOTE — PROGRESS NOTES
Here for INR check in coumadin clinic in the office.     CURRENT COUMADIN DOSE:  8mg M,F and 7mg daily the rest of the week     CHANGES OR COMPLAINTS:  No changes    INR RESULTS TODAY:  2.2 ( in goal)    PLAN:  CPM coumadin  Next INR in one month  Appointme

## 2019-12-04 ENCOUNTER — TELEPHONE (OUTPATIENT)
Dept: FAMILY MEDICINE CLINIC | Facility: CLINIC | Age: 79
End: 2019-12-04

## 2019-12-05 NOTE — TELEPHONE ENCOUNTER
Spoke with Amina Garcia at [facility]. Updated her on coumadin dosing from yesterday's INR visit so she can fill patient's pill packs today.

## 2019-12-13 ENCOUNTER — TELEPHONE (OUTPATIENT)
Dept: FAMILY MEDICINE CLINIC | Facility: CLINIC | Age: 79
End: 2019-12-13

## 2019-12-13 ENCOUNTER — ANTI-COAG VISIT (OUTPATIENT)
Dept: FAMILY MEDICINE CLINIC | Facility: CLINIC | Age: 79
End: 2019-12-13
Payer: MEDICARE

## 2019-12-13 VITALS — DIASTOLIC BLOOD PRESSURE: 78 MMHG | SYSTOLIC BLOOD PRESSURE: 138 MMHG

## 2019-12-13 DIAGNOSIS — I48.21 PERMANENT ATRIAL FIBRILLATION (HCC): ICD-10-CM

## 2019-12-13 DIAGNOSIS — Z79.01 LONG TERM (CURRENT) USE OF ANTICOAGULANTS: ICD-10-CM

## 2019-12-13 PROCEDURE — 85610 PROTHROMBIN TIME: CPT | Performed by: FAMILY MEDICINE

## 2019-12-13 PROCEDURE — 93793 ANTICOAG MGMT PT WARFARIN: CPT | Performed by: FAMILY MEDICINE

## 2019-12-13 NOTE — TELEPHONE ENCOUNTER
She can start using saline nasal spray daily. Hold any steroids, and increase humditiy in home. Also recommend visit to assess nares. Use full face mask for now.

## 2019-12-13 NOTE — TELEPHONE ENCOUNTER
Spoke with patient and she has an appointment with Dr. Joe Brown on 1/7/20. Reviewed Dr. Carol Sharp recommendations and offered a sooner appointment, but the patient said she is fine with the appt. On 1/7/20. The patient will call if her nose bleeds worsen.

## 2019-12-13 NOTE — PROGRESS NOTES
Here for INR check in coumadin clinic in the office due to nose bleeds this week    CURRENT COUMADIN DOSE:  8mg M,F and 7mg daily the rest of the week     CHANGES OR COMPLAINTS:  Nose bleeds 20 min each four times this week . She feels it is her CPaP.

## 2019-12-13 NOTE — TELEPHONE ENCOUNTER
Patient calling stating she has had 4 nose bleeds this week. Bleeding lasts about 20 min and then she gets it to stop with pressure. States is is not profuse or a large amount of blood. She is concerned it may be her CPap that is causing nose bleed?   She

## 2019-12-13 NOTE — TELEPHONE ENCOUNTER
Patient called earlier today complaining of nose bleeds 4 times this week lasting 20 min each. She felt it was her CPap machine that was the cause. Dr. Connie Centeno was notified and her advise was given to patient. Please see earlier telephone encounter.       I ha

## 2019-12-16 ENCOUNTER — TELEPHONE (OUTPATIENT)
Dept: FAMILY MEDICINE CLINIC | Facility: CLINIC | Age: 79
End: 2019-12-16

## 2019-12-20 RX ORDER — METOPROLOL SUCCINATE 50 MG/1
TABLET, EXTENDED RELEASE ORAL
Qty: 180 TABLET | Refills: 2 | Status: SHIPPED | OUTPATIENT
Start: 2019-12-20 | End: 2020-09-16

## 2019-12-20 RX ORDER — POTASSIUM CHLORIDE 750 MG/1
TABLET, EXTENDED RELEASE ORAL
Qty: 90 TABLET | Refills: 2 | Status: SHIPPED | OUTPATIENT
Start: 2019-12-20 | End: 2020-09-16

## 2019-12-20 NOTE — TELEPHONE ENCOUNTER
Future appt:     Your appointments     Date & Time Appointment Department [facility])    Jan 07, 2020 10:00 AM CST Anti-Coag with [facility] [address], [place] [facility])        Jan 07, 2020 10:30 AM CST S

## 2020-01-02 ENCOUNTER — PATIENT OUTREACH (OUTPATIENT)
Dept: CASE MANAGEMENT | Age: 80
End: 2020-01-02

## 2020-01-04 ENCOUNTER — TELEPHONE (OUTPATIENT)
Dept: FAMILY MEDICINE CLINIC | Facility: CLINIC | Age: 80
End: 2020-01-04

## 2020-01-04 NOTE — TELEPHONE ENCOUNTER
Pt has had mild to moderate headaches off/on for last 2 wks. Pt has noticed some elvation in BP. BP today 155/75 (taken at [facility]). Pt mentioned that week prior her BP was 863 systolic but could not recall diastolic value.   Pt also mentioned she has a sor

## 2020-01-06 ENCOUNTER — OFFICE VISIT (OUTPATIENT)
Dept: FAMILY MEDICINE CLINIC | Facility: CLINIC | Age: 80
End: 2020-01-06
Payer: MEDICARE

## 2020-01-06 VITALS
HEIGHT: 58.5 IN | DIASTOLIC BLOOD PRESSURE: 56 MMHG | BODY MASS INDEX: 45.73 KG/M2 | OXYGEN SATURATION: 98 % | HEART RATE: 84 BPM | RESPIRATION RATE: 20 BRPM | TEMPERATURE: 98 F | SYSTOLIC BLOOD PRESSURE: 134 MMHG | WEIGHT: 223.81 LBS

## 2020-01-06 DIAGNOSIS — I10 ESSENTIAL HYPERTENSION, BENIGN: Primary | ICD-10-CM

## 2020-01-06 DIAGNOSIS — J34.89 NASAL SORE: ICD-10-CM

## 2020-01-06 DIAGNOSIS — I48.21 PERMANENT ATRIAL FIBRILLATION (HCC): ICD-10-CM

## 2020-01-06 DIAGNOSIS — E66.01 CLASS 3 SEVERE OBESITY DUE TO EXCESS CALORIES WITH SERIOUS COMORBIDITY AND BODY MASS INDEX (BMI) OF 40.0 TO 44.9 IN ADULT (HCC): ICD-10-CM

## 2020-01-06 PROCEDURE — 99214 OFFICE O/P EST MOD 30 MIN: CPT | Performed by: FAMILY MEDICINE

## 2020-01-06 NOTE — PATIENT INSTRUCTIONS
Monitor BP-- if repeatatively > 150/ 90  Then please call us    rec continue present medications    rec antibiotic ointment to left nasal irritation-- if not better then ENT evaluation

## 2020-01-06 NOTE — PROGRESS NOTES
[facility] [place]  PROGRESS NOTE  Chief Complaint:   Patient presents with:  Blood Pressure  Headache      HPI:   This is a 67year old female  Here with concerns of iintermittent elevation BP and headaches     on / off headache 2 weeks.  Upper History Narrative      Not on file    Family History:  Family History   Problem Relation Age of Onset   • Heart Disorder Mother    • Cancer Son    • Heart Disorder Brother    • Heart Disorder Brother    • Heart Disorder Brother    • Stroke Brother      All not taking: Reported on 1/6/2020 ) 1 Device 0   • Hydrocortisone 2.5 % External Lotion Apply 1 Application topically 2 (two) times daily.  Apply sparingly to affected area twice a day as needed (Patient not taking: Reported on 1/6/2020 ) 59 mL 0      Counse Clear to auscultation bilterally, no rales/rhonchi/wheezing. HEART:  Regular rate and rhythm, S1 and S2 are normal, no murmurs, rubs or gallops. EXTREMITIES: No edema, no cyanosis, no clubbing, FROM, 2+ dorsalis pedis pulses bilaterally.   ABDOMEN: Soft, Low back pain     Candidiasis of skin and nails     Tubular adenoma of colon     Eczema     Gastroesophageal reflux disease     Diaphragmatic hernia     Familial multiple lipoprotein-type hyperlipidemia     Obstructive sleep apnea     Osteoarthrosis     Pe

## 2020-01-07 ENCOUNTER — ANTI-COAG VISIT (OUTPATIENT)
Dept: FAMILY MEDICINE CLINIC | Facility: CLINIC | Age: 80
End: 2020-01-07
Payer: MEDICARE

## 2020-01-07 DIAGNOSIS — I48.21 PERMANENT ATRIAL FIBRILLATION (HCC): ICD-10-CM

## 2020-01-07 DIAGNOSIS — Z79.01 LONG TERM (CURRENT) USE OF ANTICOAGULANTS: ICD-10-CM

## 2020-01-07 LAB — INR: 2.6 (ref 0.8–1.2)

## 2020-01-07 PROCEDURE — 85610 PROTHROMBIN TIME: CPT | Performed by: FAMILY MEDICINE

## 2020-01-07 PROCEDURE — 93793 ANTICOAG MGMT PT WARFARIN: CPT | Performed by: FAMILY MEDICINE

## 2020-01-07 NOTE — PROGRESS NOTES
Here for INR check in coumadin clinic in the office.     CURRENT COUMADIN DOSE:  8mg M,F and 7mg daily the rest of the week     CHANGES OR COMPLAINTS:  No changes    INR RESULTS TODAY:  2.6 ( in goal, stable)    PLAN:  CPM coumadin   Next INR due in one mon

## 2020-01-17 ENCOUNTER — TELEPHONE (OUTPATIENT)
Dept: FAMILY MEDICINE CLINIC | Facility: CLINIC | Age: 80
End: 2020-01-17

## 2020-01-17 RX ORDER — FAMOTIDINE 40 MG/1
20 TABLET, FILM COATED ORAL 2 TIMES DAILY
Qty: 90 TABLET | Refills: 1 | Status: SHIPPED | OUTPATIENT
Start: 2020-01-17 | End: 2020-09-04

## 2020-01-17 NOTE — TELEPHONE ENCOUNTER
Future appt:     Your appointments     Date & Time Appointment Department [facility])    Jan 30, 2020  3:20 PM CST Sleep Follow Up with Deysi Bae MD [address] [facility], [place] ([facility])        Feb 04, 2020  9:45 AM

## 2020-01-21 RX ORDER — LISINOPRIL 5 MG/1
5 TABLET ORAL DAILY
Qty: 90 TABLET | Refills: 1 | Status: SHIPPED | OUTPATIENT
Start: 2020-01-21 | End: 2020-07-22

## 2020-01-21 NOTE — TELEPHONE ENCOUNTER
Future appt:     Your appointments     Date & Time Appointment Department [facility])    Jan 30, 2020  3:20 PM CST Sleep Follow Up with Nettie Khanna MD [address] [facility], [facility] ([facility])        Feb 04, 2020  9:45 AM

## 2020-02-04 ENCOUNTER — ANTI-COAG VISIT (OUTPATIENT)
Dept: FAMILY MEDICINE CLINIC | Facility: CLINIC | Age: 80
End: 2020-02-04
Payer: MEDICARE

## 2020-02-04 ENCOUNTER — OFFICE VISIT (OUTPATIENT)
Dept: FAMILY MEDICINE CLINIC | Facility: CLINIC | Age: 80
End: 2020-02-04
Payer: MEDICARE

## 2020-02-04 VITALS
OXYGEN SATURATION: 96 % | WEIGHT: 221 LBS | TEMPERATURE: 98 F | DIASTOLIC BLOOD PRESSURE: 78 MMHG | HEIGHT: 58.5 IN | HEART RATE: 78 BPM | SYSTOLIC BLOOD PRESSURE: 128 MMHG | BODY MASS INDEX: 45.15 KG/M2

## 2020-02-04 DIAGNOSIS — G47.33 OBSTRUCTIVE SLEEP APNEA: Primary | ICD-10-CM

## 2020-02-04 DIAGNOSIS — I48.21 PERMANENT ATRIAL FIBRILLATION (HCC): ICD-10-CM

## 2020-02-04 DIAGNOSIS — Z79.01 LONG TERM (CURRENT) USE OF ANTICOAGULANTS: ICD-10-CM

## 2020-02-04 LAB — INR: 2.4 (ref 0.8–1.2)

## 2020-02-04 PROCEDURE — 99213 OFFICE O/P EST LOW 20 MIN: CPT | Performed by: NURSE PRACTITIONER

## 2020-02-04 PROCEDURE — 85610 PROTHROMBIN TIME: CPT | Performed by: FAMILY MEDICINE

## 2020-02-04 PROCEDURE — 93793 ANTICOAG MGMT PT WARFARIN: CPT | Performed by: FAMILY MEDICINE

## 2020-02-04 NOTE — PROGRESS NOTES
[facility]  [facility]        HPI:   This is a 67year old female coming in for Patient presents with:  Obstructive Sleep Apnea (NUVIA)      HPI:     Patient is present to recheck on sleep therapy.  States that it has taken 6 month History:   Procedure Laterality Date   • LEEP      cervical dysplasia   • LUMPECTOMY LEFT     • LUMPECTOMY RIGHT     • OTHER      Surgery on both feet-- bunion   • REMOVAL GALLBLADDER       Social History:  Social History    Patient does not qualify to [facility] tablet (20 mg total) by mouth daily. 90 tablet 0   • ipratropium-albuterol 0.5-2.5 (3) MG/3ML Inhalation Solution Inhale 3 mL into the lungs every 4 (four) hours as needed.      • Spacer/Aero-Holding Chambers (AEROCHAMBER MINI CHAMBER) Does not apply Device including canthus     Mixed conductive and sensorineural hearing loss of both ears     Nasal sore      REVIEW OF SYSTEMS:   Review of Systems   Constitutional: Negative. HENT: Negative. Eyes: Negative. Respiratory: Negative.     Cardiovascular: Neg Psychiatric: She has a normal mood and affect. Her behavior is normal. Judgment and thought content normal.   Nursing note and vitals reviewed. ASSESSMENT AND PLAN:   1. Obstructive sleep apnea    Patient Instructions   Continue sleep therapy.    Betty Villasenor questions, complications, allergies, or worsening or changing symptoms. Parent is to call with any side effects or complications from the treatments as a result of today.        [address]  2/4/2020  10:10 AM

## 2020-02-04 NOTE — PROGRESS NOTES
Here for INR check in coumadin clinic in the office. Appt with provider next today. CURRENT COUMADIN DOSE:  8mg M,F and 7mg daily the rest of the week     CHANGES OR COMPLAINTS:  Nose bleed.     INR RESULTS TODAY:  2.4 ( in goal)    PLAN:  CPM coumadin

## 2020-03-20 RX ORDER — FAMOTIDINE 20 MG/1
20 TABLET ORAL 2 TIMES DAILY
Qty: 60 TABLET | Refills: 3 | OUTPATIENT
Start: 2020-03-20

## 2020-03-20 NOTE — TELEPHONE ENCOUNTER
Future appt:     Your appointments     Date & Time Appointment Department ([address], [address])    Mar 23, 2020  9:30 AM CDT Anti-Coag with [facility] [address] [place] [address],[address], [place] ([facility])        Aug 04, 2020 10:00 AM CDT S

## 2020-03-23 ENCOUNTER — TELEPHONE (OUTPATIENT)
Dept: FAMILY MEDICINE CLINIC | Facility: CLINIC | Age: 80
End: 2020-03-23

## 2020-03-23 NOTE — TELEPHONE ENCOUNTER
Patient states she just came back from [place] this is her first week back, patient states she feels fine but she wants to know if [place] wants her to come in for her coumadin today. Patient cancelled her appt for today.

## 2020-03-23 NOTE — TELEPHONE ENCOUNTER
Patient cancelled routine monthly INR for today. Returned from trip to [place] on 3/15/20. Rescheduled INR to 4/1/20. Denies any bleeding problems. She denies any cough, sore throat, fever or difficulty breathing. States she feels well.      Denies know

## 2020-03-23 NOTE — TELEPHONE ENCOUNTER
I would recommend patient home quaranteen/isolation for  2weeks. Patient to call if develops fever or any respiratory symptoms. Patient can delay having her INR checked until after a 2-week timeframe has occurred.   Patient to call if she has any bleeding

## 2020-03-24 RX ORDER — WARFARIN SODIUM 1 MG/1
2 TABLET ORAL DAILY
Qty: 180 TABLET | Refills: 1 | Status: SHIPPED | OUTPATIENT
Start: 2020-03-24 | End: 2021-01-22

## 2020-03-24 RX ORDER — FAMOTIDINE 20 MG/1
20 TABLET ORAL 2 TIMES DAILY
Qty: 60 TABLET | Refills: 3 | OUTPATIENT
Start: 2020-03-24

## 2020-03-24 NOTE — TELEPHONE ENCOUNTER
Future appt:     Your appointments     Date & Time Appointment Department [facility])    Apr 01, 2020 10:45 AM CDT Anti-Coag with [facility] [address] [place] [address],[address], [place] (Marvin Maria)        Aug 04, 2020 10:00 AM CDT S

## 2020-03-24 NOTE — TELEPHONE ENCOUNTER
Called patient to clarify. Patient takes Warfarin 6mg as well as 1mg. In chart it looks like it was discontinued. Future appt:     Your appointments     Date & Time Appointment Department [facility])    Apr 01, 2020 10:45 AM CDT Anti-Coag with [facility] COUMADIN

## 2020-04-01 ENCOUNTER — ANTI-COAG VISIT (OUTPATIENT)
Dept: FAMILY MEDICINE CLINIC | Facility: CLINIC | Age: 80
End: 2020-04-01
Payer: MEDICARE

## 2020-04-01 DIAGNOSIS — Z79.01 LONG TERM (CURRENT) USE OF ANTICOAGULANTS: ICD-10-CM

## 2020-04-01 DIAGNOSIS — I48.21 PERMANENT ATRIAL FIBRILLATION (HCC): ICD-10-CM

## 2020-04-01 PROCEDURE — 93793 ANTICOAG MGMT PT WARFARIN: CPT | Performed by: FAMILY MEDICINE

## 2020-04-01 PROCEDURE — 85610 PROTHROMBIN TIME: CPT | Performed by: FAMILY MEDICINE

## 2020-04-01 NOTE — PROGRESS NOTES
Here for INR check in coumadin clinic in the office.     CURRENT COUMADIN DOSE:  8mg M,F and 7mg daily the rest of the week    CHANGES OR COMPLAINTS:  Eating more cabbage    INR RESULTS TODAY:  1.8 ( too low)    PLAN:  Increase coumadin today to 8mg  [place]

## 2020-04-15 ENCOUNTER — ANTI-COAG VISIT (OUTPATIENT)
Dept: FAMILY MEDICINE CLINIC | Facility: CLINIC | Age: 80
End: 2020-04-15
Payer: MEDICARE

## 2020-04-15 DIAGNOSIS — I48.21 PERMANENT ATRIAL FIBRILLATION (HCC): ICD-10-CM

## 2020-04-15 DIAGNOSIS — Z79.01 LONG TERM (CURRENT) USE OF ANTICOAGULANTS: ICD-10-CM

## 2020-04-15 PROCEDURE — 93793 ANTICOAG MGMT PT WARFARIN: CPT | Performed by: FAMILY MEDICINE

## 2020-04-15 PROCEDURE — 85610 PROTHROMBIN TIME: CPT | Performed by: FAMILY MEDICINE

## 2020-04-15 NOTE — PROGRESS NOTES
Here for INR check in coumadin clinic in the office.     CURRENT COUMADIN DOSE:  8mg M,F and 7mg daily the rest of the week     CHANGES OR COMPLAINTS:  No changes    INR RESULTS TODAY:  1.9 ( still slightly below goal)    PLAN:  Increase coumadin to 8mg M,W

## 2020-04-29 ENCOUNTER — ANTI-COAG VISIT (OUTPATIENT)
Dept: FAMILY MEDICINE CLINIC | Facility: CLINIC | Age: 80
End: 2020-04-29
Payer: MEDICARE

## 2020-04-29 VITALS
OXYGEN SATURATION: 99 % | SYSTOLIC BLOOD PRESSURE: 138 MMHG | TEMPERATURE: 98 F | HEIGHT: 58.5 IN | DIASTOLIC BLOOD PRESSURE: 78 MMHG | WEIGHT: 231 LBS | HEART RATE: 80 BPM | BODY MASS INDEX: 47.2 KG/M2

## 2020-04-29 DIAGNOSIS — I48.21 PERMANENT ATRIAL FIBRILLATION (HCC): ICD-10-CM

## 2020-04-29 DIAGNOSIS — Z79.01 LONG TERM (CURRENT) USE OF ANTICOAGULANTS: ICD-10-CM

## 2020-04-29 PROCEDURE — 85610 PROTHROMBIN TIME: CPT | Performed by: FAMILY MEDICINE

## 2020-04-29 PROCEDURE — 93793 ANTICOAG MGMT PT WARFARIN: CPT | Performed by: FAMILY MEDICINE

## 2020-04-29 NOTE — PROGRESS NOTES
Here for INR check in coumadin clinic in the office.     CURRENT COUMADIN DOSE:  8mg M,W,F and 7mg daily the rest of the week     CHANGES OR COMPLAINTS:  Eating less greens    INR RESULTS TODAY:  3.1 ( slightly above goal)    PLAN:  Coumadin 4mg today, then

## 2020-05-12 ENCOUNTER — ANTI-COAG VISIT (OUTPATIENT)
Dept: FAMILY MEDICINE CLINIC | Facility: CLINIC | Age: 80
End: 2020-05-12
Payer: MEDICARE

## 2020-05-12 VITALS
OXYGEN SATURATION: 98 % | WEIGHT: 231 LBS | HEART RATE: 62 BPM | DIASTOLIC BLOOD PRESSURE: 78 MMHG | HEIGHT: 58.5 IN | BODY MASS INDEX: 47.2 KG/M2 | TEMPERATURE: 98 F | SYSTOLIC BLOOD PRESSURE: 120 MMHG

## 2020-05-12 DIAGNOSIS — I48.21 PERMANENT ATRIAL FIBRILLATION (HCC): ICD-10-CM

## 2020-05-12 DIAGNOSIS — Z79.01 LONG TERM (CURRENT) USE OF ANTICOAGULANTS: ICD-10-CM

## 2020-05-12 PROCEDURE — 85610 PROTHROMBIN TIME: CPT | Performed by: FAMILY MEDICINE

## 2020-05-12 PROCEDURE — 93793 ANTICOAG MGMT PT WARFARIN: CPT | Performed by: FAMILY MEDICINE

## 2020-05-12 NOTE — PROGRESS NOTES
Here for INR check in coumadin clinic in the office.     CURRENT COUMADIN DOSE:  8mg M,W,F and 7mg daily the rest of the week    CHANGES OR COMPLAINTS:  No changes    INR RESULTS TODAY:  3.1 ( above goal)    PLAN:  Hold today, then 8mg M,F and 7mg daily the

## 2020-05-13 RX ORDER — WARFARIN SODIUM 6 MG/1
TABLET ORAL
Qty: 90 TABLET | Refills: 3 | Status: SHIPPED | OUTPATIENT
Start: 2020-05-13 | End: 2021-05-12

## 2020-05-13 NOTE — TELEPHONE ENCOUNTER
Future appt:     Your appointments     Date & Time Appointment Department [facility])    May 26, 2020 10:15 AM CDT Anti-Coag with [facility] [address], [place] ([place])        Aug 04, 2020 10:00 AM CDT S

## 2020-05-26 ENCOUNTER — ANTI-COAG VISIT (OUTPATIENT)
Dept: FAMILY MEDICINE CLINIC | Facility: CLINIC | Age: 80
End: 2020-05-26
Payer: MEDICARE

## 2020-05-26 DIAGNOSIS — Z79.01 LONG TERM (CURRENT) USE OF ANTICOAGULANTS: ICD-10-CM

## 2020-05-26 DIAGNOSIS — I48.21 PERMANENT ATRIAL FIBRILLATION (HCC): ICD-10-CM

## 2020-05-26 PROCEDURE — 85610 PROTHROMBIN TIME: CPT | Performed by: FAMILY MEDICINE

## 2020-05-26 PROCEDURE — 93793 ANTICOAG MGMT PT WARFARIN: CPT | Performed by: FAMILY MEDICINE

## 2020-05-27 VITALS
TEMPERATURE: 98 F | DIASTOLIC BLOOD PRESSURE: 78 MMHG | RESPIRATION RATE: 16 BRPM | HEART RATE: 62 BPM | SYSTOLIC BLOOD PRESSURE: 122 MMHG | OXYGEN SATURATION: 98 %

## 2020-05-27 NOTE — PROGRESS NOTES
Here for INR check in coumadin clinic in the office.     CURRENT COUMADIN DOSE:  8mg M,F and 7mg daily the rest of the week    CHANGES OR COMPLAINTS:  No changes    INR RESULTS TODAY:  2.6 ( in goal)    PLAN:  CPM coumadin   Next INR in one month  Appointme

## 2020-06-23 ENCOUNTER — ANTI-COAG VISIT (OUTPATIENT)
Dept: FAMILY MEDICINE CLINIC | Facility: CLINIC | Age: 80
End: 2020-06-23
Payer: MEDICARE

## 2020-06-23 VITALS
HEART RATE: 83 BPM | DIASTOLIC BLOOD PRESSURE: 78 MMHG | TEMPERATURE: 98 F | OXYGEN SATURATION: 98 % | SYSTOLIC BLOOD PRESSURE: 142 MMHG | RESPIRATION RATE: 16 BRPM

## 2020-06-23 DIAGNOSIS — Z79.01 LONG TERM (CURRENT) USE OF ANTICOAGULANTS: ICD-10-CM

## 2020-06-23 DIAGNOSIS — I48.21 PERMANENT ATRIAL FIBRILLATION (HCC): ICD-10-CM

## 2020-06-23 PROCEDURE — 85610 PROTHROMBIN TIME: CPT | Performed by: FAMILY MEDICINE

## 2020-06-23 PROCEDURE — 93793 ANTICOAG MGMT PT WARFARIN: CPT | Performed by: FAMILY MEDICINE

## 2020-06-23 NOTE — PROGRESS NOTES
Here for INR check in coumadin clinic in the office.     CURRENT COUMADIN DOSE:  8mg M,F and 7mg daily the rest of the week     CHANGES OR COMPLAINTS:  No changes    INR RESULTS TODAY:  2.3 ( in goal)    PLAN:  CPM coumadin   Next INR due in one month  Appo

## 2020-06-29 ENCOUNTER — TELEPHONE (OUTPATIENT)
Dept: FAMILY MEDICINE CLINIC | Facility: CLINIC | Age: 80
End: 2020-06-29

## 2020-06-29 NOTE — TELEPHONE ENCOUNTER
----- Message from Osvaldo Shea sent at 6/29/2020  9:21 AM CDT -----  Patient missed her scheduled coumadin and would like a call back on how to split her INR on a scheduled base?

## 2020-06-29 NOTE — TELEPHONE ENCOUNTER
Patient missed coumadin dose of 7mg last evening. Today she is supposed to take 8mg. Advised patient to take coumadin 5mg now and then take her regular coumadin dose of 8mg later this evening. Patient agrees.      Patient denies any problems with bleedi

## 2020-07-02 NOTE — PROGRESS NOTES
[facility] [place]  PROGRESS NOTE  Chief Complaint:   Patient presents with:  Post-Op      HPI:   This is a 69year old female coming in for medical follow-up after several procedures.   Patient was seen preoperatively and had had her Coumadin on Hyperlipidemia      Past Surgical History:  No date: LEEP      Comment: cervical dysplasia  No date: LUMPECTOMY LEFT  No date: LUMPECTOMY RIGHT  No date: OTHER      Comment: Surgery on both feet-- bunion  No date: REMOVAL GALLBLADDER  Social History:  Soci DiltiaZEM HCl (CARDIZEM) 60 MG Oral Tab Take 60 mg by mouth daily.  Disp:  Rfl:    Enoxaparin Sodium 150 MG/ML Subcutaneous Solution Inject into skin daily Disp:  Rfl:    Warfarin Sodium 6 MG Oral Tab  Disp:  Rfl:    Warfarin Sodium 1 MG Oral Tab  Disp: with surgical incision just below the medial aspect of her lid. Eye is crusty with ointment applied. There is significant periocular bruising. Ears: External normal. Nose: patent, mild rhinorrhea blood-tinged throat:  No tonsillar erythema or exudate. movement disorder     Rectocele     Other psoriasis     Osteopenia of spine     Facial basal cell cancer     Hematemesis without nausea     Post-operative state      Patient Instructions   rec INR and HGB today    Continue Lovenox   NO COUMADIN    Recheck Parent

## 2020-07-16 ENCOUNTER — TELEPHONE (OUTPATIENT)
Dept: FAMILY MEDICINE CLINIC | Facility: CLINIC | Age: 80
End: 2020-07-16

## 2020-07-16 NOTE — TELEPHONE ENCOUNTER
Current coumadin dose given to Beba Contreras for pill packs based on 6/23/20 anticoagulation appointment. Advised that next INR appointment is on 7/21/20.

## 2020-07-20 ENCOUNTER — TELEPHONE (OUTPATIENT)
Dept: FAMILY MEDICINE CLINIC | Facility: CLINIC | Age: 80
End: 2020-07-20

## 2020-07-20 NOTE — TELEPHONE ENCOUNTER
Patient missed her coumadin dosage last evening. Noted that patient has an INR appointment tomorrow. Tonight take regular coumadin dose and will adjust dosing if needed tomorrow after INR result.

## 2020-07-22 DIAGNOSIS — E53.8 VITAMIN B12 DEFICIENCY: ICD-10-CM

## 2020-07-22 DIAGNOSIS — I10 ESSENTIAL HYPERTENSION, BENIGN: Primary | ICD-10-CM

## 2020-07-22 DIAGNOSIS — G45.9 TRANSIENT CEREBRAL ISCHEMIA, UNSPECIFIED TYPE: ICD-10-CM

## 2020-07-22 DIAGNOSIS — E78.49 FAMILIAL MULTIPLE LIPOPROTEIN-TYPE HYPERLIPIDEMIA: ICD-10-CM

## 2020-07-22 DIAGNOSIS — E55.9 VITAMIN D DEFICIENCY: ICD-10-CM

## 2020-07-22 RX ORDER — LISINOPRIL 5 MG/1
5 TABLET ORAL DAILY
Qty: 90 TABLET | Refills: 0 | Status: SHIPPED | OUTPATIENT
Start: 2020-07-22 | End: 2020-10-16

## 2020-07-22 NOTE — TELEPHONE ENCOUNTER
Future appt:     Your appointments     Date & Time Appointment Department [facility])    Aug 04, 2020 10:00 AM CDT Sleep Follow Up with Adriana Hemphill, [address], [place] ([place])            [facility]

## 2020-07-22 NOTE — TELEPHONE ENCOUNTER
Patient not seen for 6 more than 6 months. Appointment advised  Patient is also past due for fasting laboratory studies.

## 2020-07-24 NOTE — TELEPHONE ENCOUNTER
Pt informed. Pt verbalized understanding. Pt states leg pain is gone at this time.     Pt plans to monitor at this time

## 2020-07-24 NOTE — TELEPHONE ENCOUNTER
If patient has isolated leg pain and/or redness, warmth noted; I would recommend acute evaluation emergency room to rule out DVT.

## 2020-07-24 NOTE — TELEPHONE ENCOUNTER
Called pt. Pt wanted to let CR know that she was having chest pain on Monday. Pt went to ER and was admitted. Pt states she had a stress test.  Pt states she had various testing and labs done at that time.     Pt states her left leg was throbbing last

## 2020-07-25 NOTE — TELEPHONE ENCOUNTER
Spoke with pt again today. Pt states she has appt with cardiology next week. Pt agreed to call back to schedule follow up with PCP.

## 2020-08-10 ENCOUNTER — TELEPHONE (OUTPATIENT)
Dept: FAMILY MEDICINE CLINIC | Facility: CLINIC | Age: 80
End: 2020-08-10

## 2020-08-10 NOTE — TELEPHONE ENCOUNTER
Patient would like her INR managed by Dr. Kusum Weber would need to do an INR lab draw and contact his office. If she would like to have it done here–she can come to do the finger poke INR.   Both Ke[place] the Coumadin nurse and Dr. French Lesch are out of the

## 2020-08-10 NOTE — TELEPHONE ENCOUNTER
Spoke with Kingsley Phillip- Dr. Holloway [facility] office. Kingsley Phillip states that pt called her: her: INR management. Pt had questions re: potential need to bridge. Kingsley Phillip states pt is supposed to have a stereotactic breast biopsy but not scheduled yet.     Kingsley Phillip states she told pt that P

## 2020-08-11 NOTE — TELEPHONE ENCOUNTER
Spoke with Blanca Blanchard from [facility] and the patient has an [address] appointment in our office on 8/20/20. The patient's PT/INR will need to be managed by Dr. Joanne Chiang, not Dr. Perez Perez.     Will send this to Rahul Perales so that she is up to date when the patient comes in

## 2020-08-17 ENCOUNTER — TELEPHONE (OUTPATIENT)
Dept: FAMILY MEDICINE CLINIC | Facility: CLINIC | Age: 80
End: 2020-08-17

## 2020-08-17 NOTE — TELEPHONE ENCOUNTER
Patient having sterotactic breast surgery and needs to set up the Lavonix shots before she can have it done.

## 2020-08-17 NOTE — TELEPHONE ENCOUNTER
Notes reviewed. Initial note indicates pt wanting Dr. Candance Dance to manage INR going forward?   If this is not the case- she will then need appoint for preop management of anticoagulation

## 2020-08-17 NOTE — TELEPHONE ENCOUNTER
Noted. Post op 2018 for eyelid surgery patient had MI and Dr. Kiley Davenport stated (per phone conversation with Dr. Kiley Davenport during admit in 2018 ) patient had a thrombus in her heart. Will need lovenox bridge for upcoming surgery.   Dr. Tal Noguera, do you need to see

## 2020-08-17 NOTE — TELEPHONE ENCOUNTER
Spoke with patient. She  wants Dr. Dotty Ramon to manage just the lovenox bridge for upcoming steriotactic breast biopsy due to complications she had after last surgery in 2018.   Patient is worried she will have complications when coming off coumadin for procedu

## 2020-08-18 NOTE — TELEPHONE ENCOUNTER
Dr. Saige Bird nurse, Fransico Sheffield,  returned call. Dr. Clinton Multani said yes, he would manage her lovenox bridge. Dr. Clinton Multani advised patient to call his office when she has scheduled stereotactic breast biopsy.

## 2020-08-18 NOTE — TELEPHONE ENCOUNTER
Patient notified of Dr. Brooklynn Barrera instructions. Patient states that her breast biospy is urgent and surgeon advised not waiting too long to get it done. Will await Dr. Jm Redd nurse's call.  Will explain that patient needs consultation with Dr. Mart Steve as sheila

## 2020-08-18 NOTE — TELEPHONE ENCOUNTER
Patient notified and informed to schedule biopsy. Probably best to schedule no sooner than the end of next week as she will need to have time hold her coumadin for 4-5 days and see Dr. Medardo Brown. Patient agrees.  She will call Dr. Medardo Brown as soon as she has the d

## 2020-08-20 ENCOUNTER — ANTI-COAG VISIT (OUTPATIENT)
Dept: FAMILY MEDICINE CLINIC | Facility: CLINIC | Age: 80
End: 2020-08-20
Payer: MEDICARE

## 2020-08-20 DIAGNOSIS — Z79.01 LONG TERM (CURRENT) USE OF ANTICOAGULANTS: ICD-10-CM

## 2020-08-20 DIAGNOSIS — I48.21 PERMANENT ATRIAL FIBRILLATION (HCC): ICD-10-CM

## 2020-08-20 LAB — INR: 2.6 (ref 0.8–1.2)

## 2020-08-20 PROCEDURE — 93793 ANTICOAG MGMT PT WARFARIN: CPT | Performed by: FAMILY MEDICINE

## 2020-08-20 PROCEDURE — 85610 PROTHROMBIN TIME: CPT | Performed by: FAMILY MEDICINE

## 2020-08-20 NOTE — PROGRESS NOTES
Here for INR check in coumadin clinic in the office. CURRENT COUMADIN DOSE:  8mg M,F and 7mg daily the rest of the week    CHANGES OR COMPLAINTS:  Stereotactic breast biopsy on 7/28/20.  Per patient, she will need to hold coumadin 5 days preop with loida

## 2020-08-25 ENCOUNTER — OFFICE VISIT (OUTPATIENT)
Dept: FAMILY MEDICINE CLINIC | Facility: CLINIC | Age: 80
End: 2020-08-25
Payer: MEDICARE

## 2020-08-25 VITALS
OXYGEN SATURATION: 95 % | BODY MASS INDEX: 48.66 KG/M2 | HEART RATE: 71 BPM | HEIGHT: 58.5 IN | RESPIRATION RATE: 16 BRPM | TEMPERATURE: 98 F | SYSTOLIC BLOOD PRESSURE: 132 MMHG | WEIGHT: 238.19 LBS | DIASTOLIC BLOOD PRESSURE: 72 MMHG

## 2020-08-25 DIAGNOSIS — G47.33 OBSTRUCTIVE SLEEP APNEA: Primary | ICD-10-CM

## 2020-08-25 DIAGNOSIS — E66.01 CLASS 3 SEVERE OBESITY DUE TO EXCESS CALORIES WITH SERIOUS COMORBIDITY AND BODY MASS INDEX (BMI) OF 40.0 TO 44.9 IN ADULT (HCC): ICD-10-CM

## 2020-08-25 PROCEDURE — 99214 OFFICE O/P EST MOD 30 MIN: CPT | Performed by: FAMILY MEDICINE

## 2020-08-25 NOTE — PATIENT INSTRUCTIONS
Recommend trial of chin strap. Discussed exercise for 7  minutes daily to sweating/ target heart rate.  (120)    Avoid/ reduce sugars use only honey and agave nectar to rashard 3 months.

## 2020-08-25 NOTE — PROGRESS NOTES
[facility]        HPI:   This is a 79year old female coming in for Patient presents with: Follow - Up: sleep      HPI: she is wearing a nasal mask and her cpa p has helped however she is still quite tired.    Was in Bronchitis 12/28/2018   • Essential hypertension    • Gastroesophageal reflux disease with esophagitis 11/22/2017   • Hyperlipidemia    • Mixed conductive and sensorineural hearing loss of both ears 9/11/2019   • Myocardial infarction [facility])    • Pneumonia one tablet by mouth all other days of the week ) 180 tablet 1   • famotidine 40 MG Oral Tab Take 0.5 tablets (20 mg total) by mouth 2 (two) times daily.  90 tablet 1   • METOPROLOL SUCCINATE ER 50 MG Oral Tablet 24 Hr TAKE ONE TABLET BY MOUTH TWO TIMES TIM comorbidity and body mass index (BMI) of 40.0 to 44.9 in adult [facility])     Atherosclerosis of native coronary artery of native heart without angina pectoris     Essential hypertension, benign     NSTEMI (non-ST elevated myocardial infarction) ([facility] [address]     Trans weight    Vital signs reviewed. Physical Exam   Constitutional: She is oriented to person, place, and time. She appears well-developed and well-nourished. No distress. HENT:   Head: Normocephalic and atraumatic.    Right Ear: External ear normal.   Left change filters,masks,hoses  and tubes and equiptment on a  regular schedule. Filters and seals shall be changed every 1 month,  Hoses every 3 months,   CPAP mask and humidifier  chamber changed every 6 month  with the Durable medical equipment provider.

## 2020-08-26 ENCOUNTER — TELEPHONE (OUTPATIENT)
Dept: FAMILY MEDICINE CLINIC | Facility: CLINIC | Age: 80
End: 2020-08-26

## 2020-08-26 NOTE — TELEPHONE ENCOUNTER
Patient dropped off her Lovenox bridge instructions from Dr. Henrique Carson for stereotactic breast biopsy on 8/28/20. PRIOR TO PROCEDURE:  Hold warfarin starting 8/23/20.    Start Lovenox 100mg SQ every 12 hours the morning of 8/25/20  Last dose of Lovenox on

## 2020-08-27 ENCOUNTER — TELEPHONE (OUTPATIENT)
Dept: FAMILY MEDICINE CLINIC | Facility: CLINIC | Age: 80
End: 2020-08-27

## 2020-08-27 NOTE — TELEPHONE ENCOUNTER
Patient has pill packs. Informed of coumadin instructions from Dr. Roni King for procedure tomorrow. See 8/26/20 telephone encounter. Patient holding coumadin now. Patient will resume coumadin on 8/28/20 in the evening and next INR will be on 9/1/20.

## 2020-09-01 ENCOUNTER — OFFICE VISIT (OUTPATIENT)
Dept: FAMILY MEDICINE CLINIC | Facility: CLINIC | Age: 80
End: 2020-09-01
Payer: MEDICARE

## 2020-09-01 ENCOUNTER — ANTI-COAG VISIT (OUTPATIENT)
Dept: FAMILY MEDICINE CLINIC | Facility: CLINIC | Age: 80
End: 2020-09-01
Payer: MEDICARE

## 2020-09-01 VITALS
TEMPERATURE: 97 F | WEIGHT: 235 LBS | HEART RATE: 78 BPM | SYSTOLIC BLOOD PRESSURE: 112 MMHG | RESPIRATION RATE: 22 BRPM | BODY MASS INDEX: 48.01 KG/M2 | DIASTOLIC BLOOD PRESSURE: 60 MMHG | OXYGEN SATURATION: 96 % | HEIGHT: 58.5 IN
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
## 2020-09-01 DIAGNOSIS — M79.89 RIGHT LEG SWELLING: ICD-10-CM

## 2020-09-01 DIAGNOSIS — I48.21 PERMANENT ATRIAL FIBRILLATION (HCC): ICD-10-CM

## 2020-09-01 DIAGNOSIS — S80.811A ABRASION, RIGHT LOWER LEG, INITIAL ENCOUNTER: Primary | ICD-10-CM

## 2020-09-01 DIAGNOSIS — Z79.01 LONG TERM (CURRENT) USE OF ANTICOAGULANTS: ICD-10-CM

## 2020-09-01 DIAGNOSIS — M79.604 RIGHT LEG PAIN: ICD-10-CM

## 2020-09-01 LAB — INR: 1.5 (ref 0.8–1.2)

## 2020-09-01 PROCEDURE — 85610 PROTHROMBIN TIME: CPT | Performed by: FAMILY MEDICINE

## 2020-09-01 PROCEDURE — 99213 OFFICE O/P EST LOW 20 MIN: CPT | Performed by: NURSE PRACTITIONER

## 2020-09-01 RX ORDER — ENOXAPARIN SODIUM 100 MG/ML
100 INJECTION SUBCUTANEOUS EVERY 12 HOURS
COMMUNITY
Start: 2020-08-28 | End: 2020-09-01 | Stop reason: ALTCHOICE

## 2020-09-01 RX ORDER — CEPHALEXIN 500 MG/1
500 CAPSULE ORAL 2 TIMES DAILY
Qty: 20 CAPSULE | Refills: 0 | Status: SHIPPED | OUTPATIENT
Start: 2020-09-01 | End: 2020-09-11

## 2020-09-01 NOTE — PATIENT INSTRUCTIONS
Elevate the right leg throughout the day,     Continue to monitior area. Follow up on Thursday, may need xray. Keep clean and dry, open to air is fine. Discharge Instructions for Cellulitis   You have been diagnosed with cellulitis.  This is an infe worse in or around the infected area, particularly if the area of redness expands to a wider area  · Shaking chills  · Swelling of the infected area  · Vomiting  Farhat last reviewed this educational content on 11/1/2019  © 9890-7686 [employer]

## 2020-09-01 NOTE — PROGRESS NOTES
HPI:    Patient ID: Eleazar Felix is a 79year old female. Patient presents to the clinic today for evaluation of right lower leg. Patient states she fell last week Tuesday, while leaving her friend's home.   She missed stepped and fell and scrap • Warfarin Sodium 1 MG Oral Tab Take 2 tablets (2 mg total) by mouth daily. Takes with 5 mg to total dose to 7 mg daily (Patient taking differently: Take 2 mg by mouth daily.  2 tablets by mouth on M,F and one tablet by mouth all other days of the week ) 18 Left Ear: External ear normal.   Mouth/Throat: Oropharynx is clear and moist.   Eyes: Pupils are equal, round, and reactive to light. Neck: Normal range of motion. Cardiovascular: Normal rate and regular rhythm.    Pulmonary/Chest: Effort normal.   Neur

## 2020-09-03 ENCOUNTER — HOSPITAL ENCOUNTER (OUTPATIENT)
Dept: GENERAL RADIOLOGY | Age: 80
Discharge: HOME | End: 2020-09-03
Attending: NURSE PRACTITIONER
Payer: MEDICARE

## 2020-09-03 ENCOUNTER — TELEPHONE (OUTPATIENT)
Dept: FAMILY MEDICINE CLINIC | Facility: CLINIC | Age: 80
End: 2020-09-03

## 2020-09-03 ENCOUNTER — ANTI-COAG VISIT (OUTPATIENT)
Dept: FAMILY MEDICINE CLINIC | Facility: CLINIC | Age: 80
End: 2020-09-03
Payer: MEDICARE
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
## 2020-09-03 DIAGNOSIS — I48.21 PERMANENT ATRIAL FIBRILLATION (HCC): ICD-10-CM

## 2020-09-03 DIAGNOSIS — Z79.01 LONG TERM (CURRENT) USE OF ANTICOAGULANTS: ICD-10-CM

## 2020-09-03 DIAGNOSIS — S80.811A ABRASION, RIGHT LOWER LEG, INITIAL ENCOUNTER: ICD-10-CM

## 2020-09-03 LAB — INR: 1.8 (ref 0.8–1.2)

## 2020-09-03 PROCEDURE — 73590 X-RAY EXAM OF LOWER LEG: CPT | Performed by: NURSE PRACTITIONER

## 2020-09-03 PROCEDURE — 93793 ANTICOAG MGMT PT WARFARIN: CPT | Performed by: FAMILY MEDICINE

## 2020-09-03 PROCEDURE — 85610 PROTHROMBIN TIME: CPT | Performed by: FAMILY MEDICINE

## 2020-09-03 PROCEDURE — 73610 X-RAY EXAM OF ANKLE: CPT | Performed by: NURSE PRACTITIONER

## 2020-09-03 NOTE — TELEPHONE ENCOUNTER
Patient here for INR appt. Saw Vicenta Howe on 9/1/20 for trauma to leg after a fall. Taking Cephalexin. Leg today is more painful and more  swollen. Still redness right shin and now red on back of calf. Had held coumadin for breast biopsy.  On a Lovenox bridg

## 2020-09-03 NOTE — TELEPHONE ENCOUNTER
[facility] ultrasound department called to give results. Patient negative for DVT. Patient will continue treatment of antibiotic, elevation, she will be seen in office in a couple days.

## 2020-09-03 NOTE — PROGRESS NOTES
Here for INR check in coumadin clinic in the office. CURRENT COUMADIN DOSE:  8mg M,F and 7mg daily the rest of the week     CHANGES OR COMPLAINTS:  Right lower leg worse. Pain and swelling. [address] notified. See telephone encounter.  She ordered U/S

## 2020-09-03 NOTE — TELEPHONE ENCOUNTER
Spoke with jane. Pt is scheduled at 2pm for US. Informed patient of US time and where to go .  Pt verbalized understanding

## 2020-09-04 ENCOUNTER — TELEPHONE (OUTPATIENT)
Dept: FAMILY MEDICINE CLINIC | Facility: CLINIC | Age: 80
End: 2020-09-04

## 2020-09-04 RX ORDER — FAMOTIDINE 40 MG/1
TABLET, FILM COATED ORAL
Qty: 90 TABLET | Refills: 0 | Status: SHIPPED | OUTPATIENT
Start: 2020-09-04 | End: 2020-10-21 | Stop reason: ALTCHOICE

## 2020-09-04 NOTE — TELEPHONE ENCOUNTER
Future appt:     Your appointments     Date & Time Appointment Department [facility])    Sep 05, 2020  9:00 AM CDT Anticoagulation Visit with Liset Lovelace ([facility] Maria)        Sep 16, 2020  1

## 2020-09-04 NOTE — TELEPHONE ENCOUNTER
Pt had left breast biopsy at [facility] on 8/28/20  Per CR, pt needs px. Pt scheduled appt on 9/16 (45 minutes). Pt also wanted to let CR know that she fell and was see per OP on 9/1. Pt mentioned that she had x-rays and ultrasound done.       Will hold

## 2020-09-05 ENCOUNTER — ANTI-COAG VISIT (OUTPATIENT)
Dept: FAMILY MEDICINE CLINIC | Facility: CLINIC | Age: 80
End: 2020-09-05
Payer: MEDICARE

## 2020-09-05 DIAGNOSIS — Z79.01 LONG TERM (CURRENT) USE OF ANTICOAGULANTS: ICD-10-CM

## 2020-09-05 DIAGNOSIS — I48.21 PERMANENT ATRIAL FIBRILLATION (HCC): ICD-10-CM

## 2020-09-05 LAB — INR: 1.7 (ref 0.8–1.2)

## 2020-09-05 PROCEDURE — 85610 PROTHROMBIN TIME: CPT | Performed by: FAMILY MEDICINE

## 2020-09-05 PROCEDURE — 93793 ANTICOAG MGMT PT WARFARIN: CPT | Performed by: FAMILY MEDICINE

## 2020-09-05 NOTE — PROGRESS NOTES
Here for INR check in coumadin clinic in the office. CURRENT COUMADIN DOSE:  8mg M,F and 7mg daily the rest of the week     CHANGES OR COMPLAINTS:  Bruising and redness on right leg improving. Bruising much lighter and fading. Continues Cephalexin.  [facility]

## 2020-09-08 ENCOUNTER — ANTI-COAG VISIT (OUTPATIENT)
Dept: FAMILY MEDICINE CLINIC | Facility: CLINIC | Age: 80
End: 2020-09-08
Payer: MEDICARE

## 2020-09-08 DIAGNOSIS — Z79.01 LONG TERM (CURRENT) USE OF ANTICOAGULANTS: ICD-10-CM

## 2020-09-08 DIAGNOSIS — I48.21 PERMANENT ATRIAL FIBRILLATION (HCC): ICD-10-CM

## 2020-09-08 LAB — INR: 2 (ref 0.8–1.2)

## 2020-09-08 PROCEDURE — 93793 ANTICOAG MGMT PT WARFARIN: CPT | Performed by: FAMILY MEDICINE

## 2020-09-08 PROCEDURE — 85610 PROTHROMBIN TIME: CPT | Performed by: FAMILY MEDICINE

## 2020-09-08 NOTE — PROGRESS NOTES
Here for INR check in coumadin clinic in the office. CURRENT COUMADIN DOSE:  8mg M,F and 7mg daily the rest of the week    CHANGES OR COMPLAINTS:  Cephalexin for 3 more days  Bruising on left leg and abdomen much improved.  Leg still pink in area of infe

## 2020-09-16 ENCOUNTER — OFFICE VISIT (OUTPATIENT)
Dept: FAMILY MEDICINE CLINIC | Facility: CLINIC | Age: 80
End: 2020-09-16
Payer: MEDICARE

## 2020-09-16 VITALS
DIASTOLIC BLOOD PRESSURE: 84 MMHG | RESPIRATION RATE: 16 BRPM | TEMPERATURE: 97 F | WEIGHT: 236.19 LBS | SYSTOLIC BLOOD PRESSURE: 128 MMHG | OXYGEN SATURATION: 96 % | HEART RATE: 78 BPM | HEIGHT: 58.5 IN | BODY MASS INDEX: 48.25 KG/M2

## 2020-09-16 DIAGNOSIS — N81.6 RECTOCELE: ICD-10-CM

## 2020-09-16 DIAGNOSIS — E78.49 FAMILIAL MULTIPLE LIPOPROTEIN-TYPE HYPERLIPIDEMIA: ICD-10-CM

## 2020-09-16 DIAGNOSIS — L03.115 CELLULITIS OF LEG, RIGHT: ICD-10-CM

## 2020-09-16 DIAGNOSIS — G47.61 PERIODIC LIMB MOVEMENT DISORDER: ICD-10-CM

## 2020-09-16 DIAGNOSIS — I21.4 NSTEMI (NON-ST ELEVATED MYOCARDIAL INFARCTION) (HCC): ICD-10-CM

## 2020-09-16 DIAGNOSIS — M48.00 SPINAL STENOSIS, UNSPECIFIED SPINAL REGION: ICD-10-CM

## 2020-09-16 DIAGNOSIS — R92.8 ABNORMAL MAMMOGRAM: ICD-10-CM

## 2020-09-16 DIAGNOSIS — K21.9 GASTROESOPHAGEAL REFLUX DISEASE WITHOUT ESOPHAGITIS: ICD-10-CM

## 2020-09-16 DIAGNOSIS — M15.9 PRIMARY OSTEOARTHRITIS INVOLVING MULTIPLE JOINTS: ICD-10-CM

## 2020-09-16 DIAGNOSIS — K44.9 DIAPHRAGMATIC HERNIA WITHOUT OBSTRUCTION AND WITHOUT GANGRENE: ICD-10-CM

## 2020-09-16 DIAGNOSIS — F41.9 ANXIETY: ICD-10-CM

## 2020-09-16 DIAGNOSIS — Z86.73 HX OF TRANSIENT ISCHEMIC ATTACK (TIA): ICD-10-CM

## 2020-09-16 DIAGNOSIS — M85.88 OSTEOPENIA OF SPINE: ICD-10-CM

## 2020-09-16 DIAGNOSIS — E66.01 CLASS 3 SEVERE OBESITY DUE TO EXCESS CALORIES WITH SERIOUS COMORBIDITY AND BODY MASS INDEX (BMI) OF 40.0 TO 44.9 IN ADULT (HCC): ICD-10-CM

## 2020-09-16 DIAGNOSIS — L40.8 OTHER PSORIASIS: ICD-10-CM

## 2020-09-16 DIAGNOSIS — I25.10 ATHEROSCLEROSIS OF NATIVE CORONARY ARTERY OF NATIVE HEART WITHOUT ANGINA PECTORIS: ICD-10-CM

## 2020-09-16 DIAGNOSIS — G47.33 OBSTRUCTIVE SLEEP APNEA: ICD-10-CM

## 2020-09-16 DIAGNOSIS — L30.9 ECZEMA, UNSPECIFIED TYPE: ICD-10-CM

## 2020-09-16 DIAGNOSIS — Z13.31 DEPRESSION SCREENING: ICD-10-CM

## 2020-09-16 DIAGNOSIS — Z00.00 ENCOUNTER FOR ANNUAL HEALTH EXAMINATION: Primary | ICD-10-CM

## 2020-09-16 DIAGNOSIS — H90.6 MIXED CONDUCTIVE AND SENSORINEURAL HEARING LOSS OF BOTH EARS: ICD-10-CM

## 2020-09-16 DIAGNOSIS — Z79.01 LONG TERM (CURRENT) USE OF ANTICOAGULANTS: ICD-10-CM

## 2020-09-16 DIAGNOSIS — G47.9 SLEEP DISTURBANCE: ICD-10-CM

## 2020-09-16 DIAGNOSIS — C44.1191 BASAL CELL CARCINOMA (BCC) OF SKIN OF LEFT UPPER EYELID INCLUDING CANTHUS: ICD-10-CM

## 2020-09-16 DIAGNOSIS — D12.6 TUBULAR ADENOMA OF COLON: ICD-10-CM

## 2020-09-16 DIAGNOSIS — I48.21 PERMANENT ATRIAL FIBRILLATION (HCC): ICD-10-CM

## 2020-09-16 DIAGNOSIS — I10 ESSENTIAL HYPERTENSION, BENIGN: ICD-10-CM

## 2020-09-16 DIAGNOSIS — N63.20 LEFT BREAST MASS: ICD-10-CM

## 2020-09-16 PROBLEM — R53.83 OTHER FATIGUE: Status: ACTIVE | Noted: 2020-07-31

## 2020-09-16 PROCEDURE — 90662 IIV NO PRSV INCREASED AG IM: CPT | Performed by: FAMILY MEDICINE

## 2020-09-16 PROCEDURE — G0009 ADMIN PNEUMOCOCCAL VACCINE: HCPCS | Performed by: FAMILY MEDICINE

## 2020-09-16 PROCEDURE — 90732 PPSV23 VACC 2 YRS+ SUBQ/IM: CPT | Performed by: FAMILY MEDICINE

## 2020-09-16 PROCEDURE — G0444 DEPRESSION SCREEN ANNUAL: HCPCS | Performed by: FAMILY MEDICINE

## 2020-09-16 PROCEDURE — 99214 OFFICE O/P EST MOD 30 MIN: CPT | Performed by: FAMILY MEDICINE

## 2020-09-16 PROCEDURE — G0008 ADMIN INFLUENZA VIRUS VAC: HCPCS | Performed by: FAMILY MEDICINE

## 2020-09-16 PROCEDURE — G0439 PPPS, SUBSEQ VISIT: HCPCS | Performed by: FAMILY MEDICINE

## 2020-09-16 RX ORDER — CEPHALEXIN 500 MG/1
500 CAPSULE ORAL 3 TIMES DAILY
Qty: 30 CAPSULE | Refills: 0 | Status: SHIPPED | OUTPATIENT
Start: 2020-09-16 | End: 2020-10-21 | Stop reason: ALTCHOICE

## 2020-09-16 RX ORDER — POTASSIUM CHLORIDE 750 MG/1
TABLET, EXTENDED RELEASE ORAL
Qty: 90 TABLET | Refills: 2 | Status: SHIPPED | OUTPATIENT
Start: 2020-09-16 | End: 2021-01-21

## 2020-09-16 RX ORDER — CEPHALEXIN 500 MG/1
500 CAPSULE ORAL 3 TIMES DAILY
Qty: 30 CAPSULE | Refills: 0 | Status: SHIPPED | OUTPATIENT
Start: 2020-09-16 | End: 2020-09-16

## 2020-09-16 RX ORDER — FAMOTIDINE 20 MG/1
20 TABLET ORAL 2 TIMES DAILY
Qty: 60 TABLET | Refills: 3 | Status: SHIPPED | OUTPATIENT
Start: 2020-09-16 | End: 2021-01-15

## 2020-09-16 RX ORDER — METOPROLOL SUCCINATE 50 MG/1
TABLET, EXTENDED RELEASE ORAL
Qty: 180 TABLET | Refills: 2 | Status: SHIPPED | OUTPATIENT
Start: 2020-09-16 | End: 2021-01-21

## 2020-09-16 NOTE — TELEPHONE ENCOUNTER
Future appt:     Your appointments     Date & Time Appointment Department [facility])    Sep 19, 2020 10:00 AM CDT Nurse Visit with Liset Lovelace (Marvin Crow)        Sep 19, 2020 10:15 AM CDT

## 2020-09-16 NOTE — PATIENT INSTRUCTIONS
Encourage more walking and activity-- walk 5-7 minutes 2 x a day    rec mammogram in 6 months- rec clinical exam DR. Ramon    rec flu and pneumonia vaccine today    Monitor diet    Continue present medications    rec antibiotic for right leg cellulitis, service except at the [facility] Visit    Abdominal aortic aneurysm screening (once between ages 73-68) IPPE only No results found for this or any previous visit.  Limited to patients who meet one of the following criteria:   • Men who are 65-75 ye Mammogram      Mammogram    Recommend Annually to at least age 76, and as needed after 76 Mammogram due on 02/29/2020 Please get this Mammogram regularly   Immunizations      Influenza  Covered Annually Orders placed or performed in visit on 10/08/19   • F Singaporean)  www. [email or web address] org  This link also has information from the [address] [place] [address] regarding Advance Directives.

## 2020-09-16 NOTE — TELEPHONE ENCOUNTER
Future appt:     Your appointments     Date & Time Appointment Department [facility])    Oct 05, 2020 10:15 AM CDT Anticoagulation Visit with KEVIN [address], Liset Ly ([place])

## 2020-09-16 NOTE — PROGRESS NOTES
CC: Annual Physical Exam    HPI:   Nate Taylor is a 79year old female who presents for a complete physical exam.  Patient complains of multiple medical problems.     Patient with recent abnormal mammogram was recommended by the radiologist to cons 10/5-- can do then-patient not fasting today. Seeing sleep clinic- seems some better with adjustments    Patient planning to have colonoscopy in November by Dr. Nanda Chun.   General health care, cancer screening, health maintenance protocols reviewed mL into the lungs every 4 (four) hours as needed.      • Spacer/Aero-Holding Chambers (AEROCHAMBER MINI CHAMBER) Does not apply Device Use as directed with proair 1 Device 0   • Cholecalciferol (VITAMIN D) 2000 units Oral Cap Take 2,000 Units by mouth daily file      Highest education level: Not on file    Occupational History      Occupation: Retired    Tobacco Use      Smoking status: Former Smoker        Quit date: 3/15/1971        Years since quittin.5      Smokeless tobacco: Never Used    Substance 0-No    Do you have any difficulty walking or getting up?: 1-Yes(Back pain causes walking difficulty)    Do you have any tripping hazards?: 0-No    Are you on multiple medications?: 1-Yes    Does pain affect your day to day activities?: 1-Yes(back pain) ataxia, numbness or tingling in the extremities,change in bowel or bladder control. HEMATOLOGIC:  Denies anemia, bleeding or bruising. LYMPHATICS:  Denies enlarged nodes  PSYCHIATRIC:  Denies depression or anxiety.   ENDOCRINOLOGIC:  Denies excessive swea nontender,no masses, no hepatosplenomegaly  BACK: No tenderness, no spasm, SLR test negative, FROM. : Deferred   EXTREMITIES: Right shin with abrasion along in anterior tibial surface from just below knee to just above ankle.   Surrounding area is red an Gastroesophageal reflux disease without esophagitis  Patient doing well    15. Rectocele  Asymptomatic colonoscopy planned    16. Tubular adenoma of colon  Asymptomatic colonoscopy planned encouraged exercise for general mobility    17.  Primary osteoarthri benign  Familial multiple lipoprotein-type hyperlipidemia  Nstemi (non-st elevated myocardial infarction) (hcc)  Permanent atrial fibrillation (hcc)  Hx of transient ischemic attack (tia)  Diaphragmatic hernia without obstruction and without gangrene  Obst must be diagnosed with one of the following:   • Hypertension   • Dyslipidemia   • Obesity (BMI ³30 kg/m2)   • Previous elevated impaired FBS or GTT   … or any two of the following:   • Overweight (BMI ³25 but <30)   • Family history of diabetes   • Age 72 history,   Americans over age 48   Americans over age 72 No flowsheet data found.  OK to schedule if you are in this risk group, make sure you have a referral   Bone Density Screening      Bone density screening   Covered every 2 yrs after ag Tetanus Toxoid- Only covered with a cut with metal- TD and TDaP Not covered by Medicare Part B) No orders found for this or any previous visit.  This may be covered with your prescription benefits, but Medicare does not cover unless Medically needed    [employer]

## 2020-09-17 PROBLEM — C44.310 FACIAL BASAL CELL CANCER: Status: RESOLVED | Noted: 2018-03-09 | Resolved: 2020-09-17

## 2020-09-17 PROBLEM — M25.512 ACUTE PAIN OF LEFT SHOULDER: Status: RESOLVED | Noted: 2018-12-28 | Resolved: 2020-09-17

## 2020-09-17 PROBLEM — J34.89 NASAL SORE: Status: RESOLVED | Noted: 2020-01-06 | Resolved: 2020-09-17

## 2020-09-17 PROBLEM — T14.8XXA HEMATOMA: Status: RESOLVED | Noted: 2018-07-14 | Resolved: 2020-09-17

## 2020-09-17 PROBLEM — Z86.73 HX OF TRANSIENT ISCHEMIC ATTACK (TIA): Status: ACTIVE | Noted: 2017-03-09

## 2020-09-19 ENCOUNTER — ANTI-COAG VISIT (OUTPATIENT)
Dept: FAMILY MEDICINE CLINIC | Facility: CLINIC | Age: 80
End: 2020-09-19
Payer: MEDICARE

## 2020-09-19 ENCOUNTER — LABORATORY ENCOUNTER (OUTPATIENT)
Dept: LAB | Age: 80
End: 2020-09-19
Attending: FAMILY MEDICINE
Payer: MEDICARE

## 2020-09-19 DIAGNOSIS — E53.8 VITAMIN B12 DEFICIENCY: ICD-10-CM

## 2020-09-19 DIAGNOSIS — G45.9 TRANSIENT CEREBRAL ISCHEMIA, UNSPECIFIED TYPE: ICD-10-CM

## 2020-09-19 DIAGNOSIS — E61.1 IRON DEFICIENCY: ICD-10-CM

## 2020-09-19 DIAGNOSIS — I10 ESSENTIAL HYPERTENSION, BENIGN: ICD-10-CM

## 2020-09-19 DIAGNOSIS — E55.9 VITAMIN D DEFICIENCY: ICD-10-CM

## 2020-09-19 DIAGNOSIS — E78.49 FAMILIAL MULTIPLE LIPOPROTEIN-TYPE HYPERLIPIDEMIA: ICD-10-CM

## 2020-09-19 DIAGNOSIS — Z79.01 LONG TERM (CURRENT) USE OF ANTICOAGULANTS: ICD-10-CM

## 2020-09-19 DIAGNOSIS — I48.21 PERMANENT ATRIAL FIBRILLATION (HCC): ICD-10-CM

## 2020-09-19 LAB
ALBUMIN SERPL-MCNC: 3.7 G/DL (ref 3.4–5)
ALBUMIN/GLOB SERPL: 0.9 {RATIO} (ref 1–2)
ALP LIVER SERPL-CCNC: 61 U/L
ALT SERPL-CCNC: 21 U/L
ANION GAP SERPL CALC-SCNC: 7 MMOL/L (ref 0–18)
AST SERPL-CCNC: 21 U/L (ref 15–37)
BASOPHILS # BLD AUTO: 0.07 X10(3) UL (ref 0–0.2)
BASOPHILS NFR BLD AUTO: 0.8 %
BILIRUB SERPL-MCNC: 0.7 MG/DL (ref 0.1–2)
BUN BLD-MCNC: 22 MG/DL (ref 7–18)
BUN/CREAT SERPL: 27.8 (ref 10–20)
CALCIUM BLD-MCNC: 9.4 MG/DL (ref 8.5–10.1)
CHLORIDE SERPL-SCNC: 104 MMOL/L (ref 98–112)
CHOLEST SMN-MCNC: 149 MG/DL (ref ?–200)
CO2 SERPL-SCNC: 25 MMOL/L (ref 21–32)
CREAT BLD-MCNC: 0.79 MG/DL
DEPRECATED RDW RBC AUTO: 43.5 FL (ref 35.1–46.3)
EOSINOPHIL # BLD AUTO: 0.37 X10(3) UL (ref 0–0.7)
EOSINOPHIL NFR BLD AUTO: 4.4 %
ERYTHROCYTE [DISTWIDTH] IN BLOOD BY AUTOMATED COUNT: 12.8 % (ref 11–15)
GLOBULIN PLAS-MCNC: 4.1 G/DL (ref 2.8–4.4)
GLUCOSE BLD-MCNC: 94 MG/DL (ref 70–99)
HAV IGM SER QL: 2.2 MG/DL (ref 1.6–2.6)
HCT VFR BLD AUTO: 42.2 %
HDLC SERPL-MCNC: 41 MG/DL (ref 40–59)
HGB BLD-MCNC: 13.3 G/DL
IMM GRANULOCYTES # BLD AUTO: 0.03 X10(3) UL (ref 0–1)
IMM GRANULOCYTES NFR BLD: 0.4 %
INR: 2.8 (ref 0.8–1.2)
IRON SATURATION: 17 %
IRON SERPL-MCNC: 81 UG/DL
LDLC SERPL CALC-MCNC: 90 MG/DL (ref ?–100)
LYMPHOCYTES # BLD AUTO: 1.11 X10(3) UL (ref 1–4)
LYMPHOCYTES NFR BLD AUTO: 13.1 %
M PROTEIN MFR SERPL ELPH: 7.8 G/DL (ref 6.4–8.2)
MCH RBC QN AUTO: 29 PG (ref 26–34)
MCHC RBC AUTO-ENTMCNC: 31.5 G/DL (ref 31–37)
MCV RBC AUTO: 92.1 FL
MONOCYTES # BLD AUTO: 0.75 X10(3) UL (ref 0.1–1)
MONOCYTES NFR BLD AUTO: 8.9 %
NEUTROPHILS # BLD AUTO: 6.12 X10 (3) UL (ref 1.5–7.7)
NEUTROPHILS # BLD AUTO: 6.12 X10(3) UL (ref 1.5–7.7)
NEUTROPHILS NFR BLD AUTO: 72.4 %
NONHDLC SERPL-MCNC: 108 MG/DL (ref ?–130)
OSMOLALITY SERPL CALC.SUM OF ELEC: 285 MOSM/KG (ref 275–295)
PATIENT FASTING Y/N/NP: YES
PATIENT FASTING Y/N/NP: YES
PLATELET # BLD AUTO: 313 10(3)UL (ref 150–450)
POTASSIUM SERPL-SCNC: 4.2 MMOL/L (ref 3.5–5.1)
RBC # BLD AUTO: 4.58 X10(6)UL
SODIUM SERPL-SCNC: 136 MMOL/L (ref 136–145)
T4 FREE SERPL-MCNC: 1.2 NG/DL (ref 0.8–1.7)
TOTAL IRON BINDING CAPACITY: 469 UG/DL (ref 240–450)
TRANSFERRIN SERPL-MCNC: 315 MG/DL (ref 200–360)
TRIGL SERPL-MCNC: 88 MG/DL (ref 30–149)
TSI SER-ACNC: 2.67 MIU/ML (ref 0.36–3.74)
VIT B12 SERPL-MCNC: 332 PG/ML (ref 193–986)
VIT D+METAB SERPL-MCNC: 31.3 NG/ML (ref 30–100)
VLDLC SERPL CALC-MCNC: 18 MG/DL (ref 0–30)
WBC # BLD AUTO: 8.5 X10(3) UL (ref 4–11)

## 2020-09-19 PROCEDURE — 80053 COMPREHEN METABOLIC PANEL: CPT

## 2020-09-19 PROCEDURE — 85025 COMPLETE CBC W/AUTO DIFF WBC: CPT

## 2020-09-19 PROCEDURE — 83550 IRON BINDING TEST: CPT

## 2020-09-19 PROCEDURE — 83735 ASSAY OF MAGNESIUM: CPT

## 2020-09-19 PROCEDURE — 82607 VITAMIN B-12: CPT

## 2020-09-19 PROCEDURE — 85610 PROTHROMBIN TIME: CPT | Performed by: FAMILY MEDICINE

## 2020-09-19 PROCEDURE — 84439 ASSAY OF FREE THYROXINE: CPT

## 2020-09-19 PROCEDURE — 82306 VITAMIN D 25 HYDROXY: CPT

## 2020-09-19 PROCEDURE — 83540 ASSAY OF IRON: CPT

## 2020-09-19 PROCEDURE — 84443 ASSAY THYROID STIM HORMONE: CPT

## 2020-09-19 PROCEDURE — 36415 COLL VENOUS BLD VENIPUNCTURE: CPT

## 2020-09-19 PROCEDURE — 80061 LIPID PANEL: CPT

## 2020-09-19 PROCEDURE — 93793 ANTICOAG MGMT PT WARFARIN: CPT | Performed by: FAMILY MEDICINE

## 2020-09-19 NOTE — PROGRESS NOTES
Here for INR check in coumadin clinic in the office. CURRENT COUMADIN DOSE:  8mg M,F and 7mg daily the rest of the week     CHANGES OR COMPLAINTS:  Cephalexin    INR RESULTS TODAY:  2.8 ( in goal)     PLAN:  Coumadin 7mg daily   Next INR in 4 days.  Appt

## 2020-09-21 ENCOUNTER — TELEPHONE (OUTPATIENT)
Dept: FAMILY MEDICINE CLINIC | Facility: CLINIC | Age: 80
End: 2020-09-21

## 2020-09-21 DIAGNOSIS — R79.0 ABNORMAL BLOOD LEVEL OF IRON: ICD-10-CM

## 2020-09-21 DIAGNOSIS — E55.9 VITAMIN D DEFICIENCY: ICD-10-CM

## 2020-09-21 DIAGNOSIS — E53.8 LOW SERUM VITAMIN B12: ICD-10-CM

## 2020-09-21 DIAGNOSIS — G47.61 PLMD (PERIODIC LIMB MOVEMENT DISORDER): Primary | ICD-10-CM

## 2020-09-21 NOTE — TELEPHONE ENCOUNTER
----- Message from Eladia Pizarro MD sent at 9/21/2020 10:31 AM CDT -----  Vitamin D is sub therapeutic. Recommend , increase  By 2000 units of vitamin D dailyif already taking. Goal of 51 for vitamin D   Recheck vitamin D level in 6  months.     Pratik Diallo

## 2020-09-21 NOTE — TELEPHONE ENCOUNTER
----- Message from HAMILTON Michel sent at 9/21/2020 10:03 AM CDT -----  Please make sure patient receives my chart message as below  Dr. J Carlos Gibbs is out of the office–I reviewed your labs–everything looks good–essentially normal.  Your vitamin

## 2020-09-22 RX ORDER — MELATONIN
1000 DAILY
COMMUNITY
End: 2021-03-02

## 2020-09-22 RX ORDER — MULTIVIT-MIN/IRON/FOLIC ACID/K 18-600-40
2000 CAPSULE ORAL DAILY
COMMUNITY

## 2020-09-23 ENCOUNTER — ANTI-COAG VISIT (OUTPATIENT)
Dept: FAMILY MEDICINE CLINIC | Facility: CLINIC | Age: 80
End: 2020-09-23
Payer: MEDICARE

## 2020-09-23 DIAGNOSIS — Z79.01 LONG TERM (CURRENT) USE OF ANTICOAGULANTS: ICD-10-CM

## 2020-09-23 DIAGNOSIS — I48.21 PERMANENT ATRIAL FIBRILLATION (HCC): ICD-10-CM

## 2020-09-23 LAB — INR: 2.3 (ref 0.8–1.2)

## 2020-09-23 PROCEDURE — 93793 ANTICOAG MGMT PT WARFARIN: CPT | Performed by: FAMILY MEDICINE

## 2020-09-23 PROCEDURE — 85610 PROTHROMBIN TIME: CPT | Performed by: FAMILY MEDICINE

## 2020-09-23 NOTE — PROGRESS NOTES
Here for INR check in coumadin clinic in the office.     CURRENT COUMADIN DOSE:  7mg daily    CHANGES OR COMPLAINTS:  Will finish antibiotic in a few days    INR RESULTS TODAY:  2.3 ( in goal)    PLAN:  Resume regular coumadin dose of 8mg M,F and 7mg daily

## 2020-10-05 ENCOUNTER — OFFICE VISIT (OUTPATIENT)
Dept: FAMILY MEDICINE CLINIC | Facility: CLINIC | Age: 80
End: 2020-10-05
Payer: MEDICARE

## 2020-10-05 ENCOUNTER — HOSPITAL ENCOUNTER (OUTPATIENT)
Dept: GENERAL RADIOLOGY | Age: 80
Discharge: HOME | End: 2020-10-05
Attending: NURSE PRACTITIONER
Payer: MEDICARE

## 2020-10-05 ENCOUNTER — TELEPHONE (OUTPATIENT)
Dept: FAMILY MEDICINE CLINIC | Facility: CLINIC | Age: 80
End: 2020-10-05

## 2020-10-05 ENCOUNTER — ANTI-COAG VISIT (OUTPATIENT)
Dept: FAMILY MEDICINE CLINIC | Facility: CLINIC | Age: 80
End: 2020-10-05
Payer: MEDICARE

## 2020-10-05 VITALS
OXYGEN SATURATION: 97 % | WEIGHT: 239 LBS | BODY MASS INDEX: 48.83 KG/M2 | RESPIRATION RATE: 22 BRPM | TEMPERATURE: 97 F | DIASTOLIC BLOOD PRESSURE: 70 MMHG | HEART RATE: 73 BPM | HEIGHT: 58.5 IN | SYSTOLIC BLOOD PRESSURE: 132 MMHG

## 2020-10-05 VITALS — TEMPERATURE: 97 F

## 2020-10-05 DIAGNOSIS — R06.02 SHORTNESS OF BREATH: ICD-10-CM

## 2020-10-05 DIAGNOSIS — R06.02 SHORTNESS OF BREATH: Primary | ICD-10-CM

## 2020-10-05 DIAGNOSIS — Z79.01 LONG TERM (CURRENT) USE OF ANTICOAGULANTS: ICD-10-CM

## 2020-10-05 DIAGNOSIS — L03.115 CELLULITIS OF RIGHT LOWER EXTREMITY: ICD-10-CM

## 2020-10-05 DIAGNOSIS — I48.21 PERMANENT ATRIAL FIBRILLATION (HCC): ICD-10-CM

## 2020-10-05 DIAGNOSIS — J18.9 PNEUMONIA OF RIGHT LOWER LOBE DUE TO INFECTIOUS ORGANISM: ICD-10-CM

## 2020-10-05 PROCEDURE — 85610 PROTHROMBIN TIME: CPT | Performed by: FAMILY MEDICINE

## 2020-10-05 PROCEDURE — 99214 OFFICE O/P EST MOD 30 MIN: CPT | Performed by: NURSE PRACTITIONER

## 2020-10-05 PROCEDURE — 71046 X-RAY EXAM CHEST 2 VIEWS: CPT | Performed by: NURSE PRACTITIONER

## 2020-10-05 RX ORDER — ALBUTEROL SULFATE 90 UG/1
AEROSOL, METERED RESPIRATORY (INHALATION)
Qty: 1 INHALER | Refills: 0 | Status: SHIPPED | OUTPATIENT
Start: 2020-10-05 | End: 2020-12-11

## 2020-10-05 RX ORDER — IPRATROPIUM BROMIDE AND ALBUTEROL SULFATE 2.5; .5 MG/3ML; MG/3ML
3 SOLUTION RESPIRATORY (INHALATION) EVERY 4 HOURS PRN
Qty: 1 CONTAINER | Refills: 0 | Status: SHIPPED | OUTPATIENT
Start: 2020-10-05 | End: 2020-11-17

## 2020-10-05 RX ORDER — BUDESONIDE AND FORMOTEROL FUMARATE DIHYDRATE 160; 4.5 UG/1; UG/1
2 AEROSOL RESPIRATORY (INHALATION) 2 TIMES DAILY
Qty: 1 INHALER | Refills: 0 | Status: SHIPPED | OUTPATIENT
Start: 2020-10-05 | End: 2020-10-06

## 2020-10-05 RX ORDER — DOXYCYCLINE HYCLATE 100 MG/1
100 CAPSULE ORAL 2 TIMES DAILY
Qty: 20 CAPSULE | Refills: 0 | Status: SHIPPED | OUTPATIENT
Start: 2020-10-05 | End: 2020-10-15

## 2020-10-05 NOTE — TELEPHONE ENCOUNTER
Spoke with patient. Medication sent to [facility].. Pt states that is okay for today but to add osco in dekalb to pref. Pharmacy for short term medication. Aly for long term.

## 2020-10-05 NOTE — PROGRESS NOTES
Here for INR check in coumadin clinic in the office. CURRENT COUMADIN DOSE:  8mg M,F and 7mg daily the rest of the week     CHANGES OR COMPLAINTS:  Asthma flare. Inhaler and nebulizer not helping.  Appt with Ginny VILLASENOR given     INR RESULTS TOD

## 2020-10-05 NOTE — PATIENT INSTRUCTIONS
Take the doxycycline twice a day until gone for the lungs and the right lower leg. Do the nebulizer with a DuoNeb twice a day. 5 minutes after doing the nebulizer, take the Symbicort, 2 puffs twice a day. Sure to rinse mouth after using.     Refill don

## 2020-10-05 NOTE — TELEPHONE ENCOUNTER
----- Message from HAMILTON Thapa sent at 10/5/2020  1:21 PM CDT -----  The radiologist felt this was more bronchitis than pneumonia. Still recommend the doxycycline. Note to MyChart.

## 2020-10-05 NOTE — TELEPHONE ENCOUNTER
Spoke with [employer]. Per Rosalina aL Dx code for SOB R06.02. [employer] accepted verbal dx code.  No further needs at this time

## 2020-10-05 NOTE — TELEPHONE ENCOUNTER
Patient states that she was seen today, states that Josselyn Mess was going to refill her medications. Pt wants to make sure that her medications go to [address] in [place].

## 2020-10-06 ENCOUNTER — TELEPHONE (OUTPATIENT)
Dept: FAMILY MEDICINE CLINIC | Facility: CLINIC | Age: 80
End: 2020-10-06

## 2020-10-06 RX ORDER — FLUTICASONE PROPIONATE AND SALMETEROL 250; 50 UG/1; UG/1
1 POWDER RESPIRATORY (INHALATION) EVERY 12 HOURS SCHEDULED
Qty: 1 PACKAGE | Refills: 2 | Status: SHIPPED | OUTPATIENT
Start: 2020-10-06 | End: 2020-11-19

## 2020-10-07 ENCOUNTER — ANTI-COAG VISIT (OUTPATIENT)
Dept: FAMILY MEDICINE CLINIC | Facility: CLINIC | Age: 80
End: 2020-10-07
Payer: MEDICARE

## 2020-10-07 DIAGNOSIS — I48.21 PERMANENT ATRIAL FIBRILLATION (HCC): ICD-10-CM

## 2020-10-07 DIAGNOSIS — Z79.01 LONG TERM (CURRENT) USE OF ANTICOAGULANTS: ICD-10-CM

## 2020-10-07 PROCEDURE — 93793 ANTICOAG MGMT PT WARFARIN: CPT | Performed by: FAMILY MEDICINE

## 2020-10-07 PROCEDURE — 85610 PROTHROMBIN TIME: CPT | Performed by: FAMILY MEDICINE

## 2020-10-07 NOTE — PROGRESS NOTES
Here for INR check in coumadin clinic in the office.     CURRENT COUMADIN DOSE:  8mg M,F and 7mg daily the rest of the week     CHANGES OR COMPLAINTS:  Doxycycline- started 2 days ago    INR RESULTS TODAY:  2.8 ( in goal)    PLAN:  CPM coumadin   Next INR i

## 2020-10-08 ENCOUNTER — TELEPHONE (OUTPATIENT)
Dept: FAMILY MEDICINE CLINIC | Facility: CLINIC | Age: 80
End: 2020-10-08

## 2020-10-08 NOTE — TELEPHONE ENCOUNTER
Calling regarding lovenox bridge instructions from Dr. Jennifer Ansari for patient's upcoming colonoscopy and upper endoscopy procedure on 11/17/20. Dr. Jennifer Ansari will send in Lovenox script to patient's pharmacy and Elina Longoria will fax lovenox bridge instructions.      Walker gao DIAPHORESIS

## 2020-10-08 NOTE — TELEPHONE ENCOUNTER
Fax received. Dr. Laly Cramer nurse practitioner Kristin VITAL, CNP sent orders. Will need to call Dr. Laly Cramer nurse next week when I return to the office to clarify her orders. Orders are not clear.

## 2020-10-09 ENCOUNTER — TELEPHONE (OUTPATIENT)
Dept: FAMILY MEDICINE CLINIC | Facility: CLINIC | Age: 80
End: 2020-10-09

## 2020-10-09 NOTE — TELEPHONE ENCOUNTER
Pt called and informed of negative covid test. Currently in ER and is waiting for a chest xray and test to see why she is so short of breath. Informed pt that Dr. Georgina Butler wants her to stay home and rest over the weekend and call with an update on Monday.  Pt

## 2020-10-09 NOTE — TELEPHONE ENCOUNTER
pt saw kourtney recently - was put on breathing txs and abx- states she is not feeling any better- made appt with dr palmer for 215 today- should she keep or what dr dr Bria Harris suggest ? [address] not in today

## 2020-10-09 NOTE — TELEPHONE ENCOUNTER
Pt had appointment with Ginny and was given antibiotics and breathing treatment. Pt states she is not feeling any better and that she cannot breathe when she lays down.  Pt states that her grandson (who she believes she was around about 10 days ago) has jus

## 2020-10-09 NOTE — TELEPHONE ENCOUNTER
Pt was advised to go and be seen in the ER for further evaluation.  Pt verbalized understanding and agreed to proceed with further treatment in the ER

## 2020-10-12 ENCOUNTER — TELEPHONE (OUTPATIENT)
Dept: FAMILY MEDICINE CLINIC | Facility: CLINIC | Age: 80
End: 2020-10-12

## 2020-10-13 ENCOUNTER — TELEPHONE (OUTPATIENT)
Dept: FAMILY MEDICINE CLINIC | Facility: CLINIC | Age: 80
End: 2020-10-13

## 2020-10-13 NOTE — TELEPHONE ENCOUNTER
Admit. COPD and dyspnea. On antibiotics. Needs INR. INR appt scheduled tomorrow. covid 19 negative on 10/9/20. See other phone note today. - - -

## 2020-10-13 NOTE — TELEPHONE ENCOUNTER
Left detailed message for pt. Comments added to appt notes for INR appt tomorrow. Asked pt to call with any questions.

## 2020-10-13 NOTE — TELEPHONE ENCOUNTER
Pt contacted  Pt has shortness of breath-  Pt states she is feeling better  No fever. No other s/s reported. Pt exposed to positive covid individual on 9/28. Pt will plan on coming to her INR appt tomorrow.     Future Appointments   Date Time Provide

## 2020-10-13 NOTE — TELEPHONE ENCOUNTER
Covid testing negative 10/9.  As long as no fever or NEW symptoms she should be able to come for INR

## 2020-10-13 NOTE — TELEPHONE ENCOUNTER
Patient states she was supposed to call Dr. Walt Fenton nurse when she got out of the hospital. Admit for dyspnea- [facility]. Dx with COPD. Currently taking antibiotic. Covid 19 test negative on 10/9/20. States she still has dyspnea.  Post hospita

## 2020-10-14 ENCOUNTER — ANTI-COAG VISIT (OUTPATIENT)
Dept: FAMILY MEDICINE CLINIC | Facility: CLINIC | Age: 80
End: 2020-10-14
Payer: MEDICARE

## 2020-10-14 VITALS — OXYGEN SATURATION: 94 % | TEMPERATURE: 97 F

## 2020-10-14 DIAGNOSIS — Z79.01 LONG TERM (CURRENT) USE OF ANTICOAGULANTS: ICD-10-CM

## 2020-10-14 DIAGNOSIS — I48.21 PERMANENT ATRIAL FIBRILLATION (HCC): ICD-10-CM

## 2020-10-14 PROCEDURE — 85610 PROTHROMBIN TIME: CPT | Performed by: FAMILY MEDICINE

## 2020-10-14 NOTE — PROGRESS NOTES
Here for INR check in coumadin clinic in the office.     CURRENT COUMADIN DOSE:  8mg M,F and 7mg daily the rest of the week     CHANGES OR COMPLAINTS:  Finished doxycycline, continues prednisone until Friday    INR RESULTS TODAY:  2.2 ( in goal)    PLAN:  C

## 2020-10-16 RX ORDER — LISINOPRIL 5 MG/1
5 TABLET ORAL DAILY
Qty: 90 TABLET | Refills: 0 | Status: SHIPPED | OUTPATIENT
Start: 2020-10-16 | End: 2021-01-15

## 2020-10-16 NOTE — TELEPHONE ENCOUNTER
Future appt:     Your appointments     Date & Time Appointment Department [facility])    Oct 21, 2020 10:30 AM CDT Anticoagulation Visit with KEVIN [address], [place] ([place])        Oct 21, 2020 11

## 2020-10-20 ENCOUNTER — TELEPHONE (OUTPATIENT)
Dept: FAMILY MEDICINE CLINIC | Facility: CLINIC | Age: 80
End: 2020-10-20

## 2020-10-20 NOTE — TELEPHONE ENCOUNTER
Pt states that her eyes are red and very itchy from allergies. She states that this is nothing new for her. appt scheduled for tomorrow because she is already seeing Daquan Blair.      Future Appointments   Date Time Provider Cora Palafox   10/21/2020 10:3

## 2020-10-21 ENCOUNTER — OFFICE VISIT (OUTPATIENT)
Dept: FAMILY MEDICINE CLINIC | Facility: CLINIC | Age: 80
End: 2020-10-21
Payer: MEDICARE

## 2020-10-21 ENCOUNTER — ANTI-COAG VISIT (OUTPATIENT)
Dept: FAMILY MEDICINE CLINIC | Facility: CLINIC | Age: 80
End: 2020-10-21
Payer: MEDICARE

## 2020-10-21 ENCOUNTER — APPOINTMENT (OUTPATIENT)
Dept: LAB | Age: 80
End: 2020-10-21
Attending: NURSE PRACTITIONER
Payer: MEDICARE

## 2020-10-21 VITALS — TEMPERATURE: 97 F

## 2020-10-21 VITALS — BODY MASS INDEX: 49 KG/M2 | WEIGHT: 239.81 LBS | HEIGHT: 58.5 IN | TEMPERATURE: 97 F

## 2020-10-21 DIAGNOSIS — L20.84 ALLERGIC (INTRINSIC) ECZEMA: Primary | ICD-10-CM

## 2020-10-21 DIAGNOSIS — J98.01 BRONCHOSPASM: ICD-10-CM

## 2020-10-21 DIAGNOSIS — Z79.01 LONG TERM (CURRENT) USE OF ANTICOAGULANTS: ICD-10-CM

## 2020-10-21 DIAGNOSIS — I48.21 PERMANENT ATRIAL FIBRILLATION (HCC): ICD-10-CM

## 2020-10-21 DIAGNOSIS — L23.9 ALLERGIC DERMATITIS: ICD-10-CM

## 2020-10-21 PROCEDURE — 85610 PROTHROMBIN TIME: CPT | Performed by: FAMILY MEDICINE

## 2020-10-21 PROCEDURE — 86003 ALLG SPEC IGE CRUDE XTRC EA: CPT | Performed by: NURSE PRACTITIONER

## 2020-10-21 PROCEDURE — 82785 ASSAY OF IGE: CPT | Performed by: NURSE PRACTITIONER

## 2020-10-21 PROCEDURE — 36415 COLL VENOUS BLD VENIPUNCTURE: CPT | Performed by: NURSE PRACTITIONER

## 2020-10-21 PROCEDURE — 99214 OFFICE O/P EST MOD 30 MIN: CPT | Performed by: NURSE PRACTITIONER

## 2020-10-21 RX ORDER — METHYLPREDNISOLONE 4 MG/1
1 TABLET ORAL AS DIRECTED
COMMUNITY
Start: 2020-10-20 | End: 2021-01-21

## 2020-10-21 NOTE — PROGRESS NOTES
Here for INR check in coumadin clinic in the office.   Also appt with provider next    CURRENT COUMADIN DOSE:  8mg M,F and 7mg daily the rest of the week     CHANGES OR COMPLAINTS:  Started prednisone 3 days ago    INR RESULTS TODAY:  2.6 ( in goal, but iwona

## 2020-10-21 NOTE — PROGRESS NOTES
CHIEF COMPLAINT:   Patient presents with:  Eye Problem: Itching/ Recent [facility] Discharge-Followed up with  Yesterday      HPI:   Danielle Serrano is a 79year old female who presents to clinic today with complaints of itching watery eyes   Was mouth daily. Takes with 5 mg to total dose to 7 mg daily (Patient taking differently: Take 2 mg by mouth daily.  2 tablets by mouth on M,F and one tablet by mouth all other days of the week ) 180 tablet 1   • Mometasone Furoate 0.1 % External Cream rec as n discharge  Eyelids–upper eyelids–skin is dry, mild erythema, eczema-like rash. EARS:.  Right tympanic membrane- clear.   Left tympanic membrane is not visible due to cerumen impaction–attempted to remove cerumen with ear loop–it is too solid-unable to ozzy

## 2020-10-21 NOTE — PATIENT INSTRUCTIONS
Apply hydrocortisone cream to eyelids being careful not to get it in your eye use until symptoms are gone no longer than 2 weeks at a time. Check allergy test today.     For your ears soaked with Debrox earwax softening drops or hydrogen peroxide twice a

## 2020-10-22 ENCOUNTER — TELEPHONE (OUTPATIENT)
Dept: FAMILY MEDICINE CLINIC | Facility: CLINIC | Age: 80
End: 2020-10-22

## 2020-10-22 NOTE — TELEPHONE ENCOUNTER
Paula Stacy from [facility] calling to confirm current coumadin dose for pill packs. Patient was seen in coumadin clinic yesterday and current dose is 7mg daily. Next INR on 10/28/20. Angie notified of above.

## 2020-10-23 ENCOUNTER — OFFICE VISIT (OUTPATIENT)
Dept: FAMILY MEDICINE CLINIC | Facility: CLINIC | Age: 80
End: 2020-10-23
Payer: MEDICARE

## 2020-10-23 ENCOUNTER — TELEPHONE (OUTPATIENT)
Dept: FAMILY MEDICINE CLINIC | Facility: CLINIC | Age: 80
End: 2020-10-23

## 2020-10-23 VITALS
RESPIRATION RATE: 20 BRPM | SYSTOLIC BLOOD PRESSURE: 152 MMHG | HEART RATE: 90 BPM | DIASTOLIC BLOOD PRESSURE: 72 MMHG | HEIGHT: 58.5 IN | TEMPERATURE: 97 F | WEIGHT: 241.38 LBS | OXYGEN SATURATION: 94 % | BODY MASS INDEX: 49.32 KG/M2

## 2020-10-23 DIAGNOSIS — L23.9 ALLERGIC DERMATITIS: ICD-10-CM

## 2020-10-23 DIAGNOSIS — H61.22 IMPACTED CERUMEN OF LEFT EAR: Primary | ICD-10-CM

## 2020-10-23 PROCEDURE — 99213 OFFICE O/P EST LOW 20 MIN: CPT | Performed by: NURSE PRACTITIONER

## 2020-10-23 NOTE — PATIENT INSTRUCTIONS
Ear wax removed    Continue hydrocortisone cream to eye lids - follow up if symptoms persist or increase

## 2020-10-23 NOTE — TELEPHONE ENCOUNTER
----- Message from HAMILTON Savage sent at 10/22/2020  9:00 PM CDT -----  Please make sure that patient receives my chart message as below-   Your food allergy tests came back negative for every food tested.    You allergies must be something else kavitha

## 2020-10-23 NOTE — PROGRESS NOTES
CHIEF COMPLAINT:   Patient presents with:  Ear Wax: Patient wants ear wax taken out       HPI:   Aiden Eduardo is a 79year old female who presents to clinic today with complaints of cerumen impaction to left ear -patient has difficulty hearing wi daily (Patient taking differently: Take 2 mg by mouth daily.  2 tablets by mouth on M,F and one tablet by mouth all other days of the week ) 180 tablet 1   • Mometasone Furoate 0.1 % External Cream rec as needed daily use for skin irritation 45 g 0   • aspi improved from previous. EARS:. Increased cerumen to left ear canal–removed with warm water irrigation and ear loop. Small amount of cerumen also removed from right ear with ear loop. Patient tolerated well TMs are clear.   SKIN: no rashes,no suspicious

## 2020-10-23 NOTE — TELEPHONE ENCOUNTER
----- Message from HAMILTON Webb sent at 10/22/2020  8:59 PM CDT -----  Please make sure that patient receives my chart message below. -  Your environmental allergy is negative for everything that was tested.    Thank you,   Roro VILLASENOR, FN

## 2020-10-27 ENCOUNTER — ANTI-COAG VISIT (OUTPATIENT)
Dept: FAMILY MEDICINE CLINIC | Facility: CLINIC | Age: 80
End: 2020-10-27
Payer: MEDICARE

## 2020-10-27 VITALS — TEMPERATURE: 97 F

## 2020-10-27 DIAGNOSIS — Z79.01 LONG TERM (CURRENT) USE OF ANTICOAGULANTS: ICD-10-CM

## 2020-10-27 DIAGNOSIS — I48.21 PERMANENT ATRIAL FIBRILLATION (HCC): ICD-10-CM

## 2020-10-27 PROCEDURE — 93793 ANTICOAG MGMT PT WARFARIN: CPT | Performed by: FAMILY MEDICINE

## 2020-10-27 PROCEDURE — 85610 PROTHROMBIN TIME: CPT | Performed by: FAMILY MEDICINE

## 2020-10-27 NOTE — PROGRESS NOTES
Here for INR check in coumadin clinic in the office.     CURRENT COUMADIN DOSE:  7mg daily    CHANGES OR COMPLAINTS:  Finished Prednisone 3 days ago    INR RESULTS TODAY:  3.0 ( upper end of goal)     PLAN:  Coumadin 6mg today, then resume 7mg daily   Next

## 2020-11-04 ENCOUNTER — TELEPHONE (OUTPATIENT)
Dept: FAMILY MEDICINE CLINIC | Facility: CLINIC | Age: 80
End: 2020-11-04

## 2020-11-04 DIAGNOSIS — L20.84 ALLERGIC (INTRINSIC) ECZEMA: Primary | ICD-10-CM

## 2020-11-04 NOTE — TELEPHONE ENCOUNTER
needs referrel to Dr. Moe Suarez for eye at [facility]. been over 4 years since seen him.

## 2020-11-04 NOTE — TELEPHONE ENCOUNTER
Patient states that she has been having a nose bleed for the last 5 days, states that it's not heavy but wonders if it has to do with her blood count. Would like a call back.

## 2020-11-04 NOTE — TELEPHONE ENCOUNTER
Pt was seen in office per VALERIE on 10/21/20. Pt was advised then to apply hydrocortisone cream to eyelids. Pt states hydrocortisone cream is not helping, states itching is very bothersome. Pt states she had seen Dr. Jeaneth Wilcox over 3 years ago.   Pt would like

## 2020-11-04 NOTE — TELEPHONE ENCOUNTER
Nose bleeding from one nostril in a mild amount each morning for past 5 days. Patient does use a C-Pap and is on warfarin therapy. She is concerned her INR may be elevated. Advised INR check today. Patient declined. Appointment scheduled for tomorrow.  Lyn

## 2020-11-05 ENCOUNTER — ANTI-COAG VISIT (OUTPATIENT)
Dept: FAMILY MEDICINE CLINIC | Facility: CLINIC | Age: 80
End: 2020-11-05
Payer: MEDICARE

## 2020-11-05 DIAGNOSIS — Z79.01 LONG TERM (CURRENT) USE OF ANTICOAGULANTS: ICD-10-CM

## 2020-11-05 DIAGNOSIS — I48.21 PERMANENT ATRIAL FIBRILLATION (HCC): ICD-10-CM

## 2020-11-05 PROCEDURE — 93793 ANTICOAG MGMT PT WARFARIN: CPT | Performed by: FAMILY MEDICINE

## 2020-11-05 PROCEDURE — 85610 PROTHROMBIN TIME: CPT | Performed by: FAMILY MEDICINE

## 2020-11-05 RX ORDER — PIMECROLIMUS 10 MG/G
1 CREAM TOPICAL 2 TIMES DAILY
Qty: 30 G | Refills: 1 | Status: SHIPPED | OUTPATIENT
Start: 2020-11-05 | End: 2020-11-09

## 2020-11-05 NOTE — TELEPHONE ENCOUNTER
Recommend we try Elidel cream (if it is not too expensive)   - Prescription sent to [facility].

## 2020-11-05 NOTE — PROGRESS NOTES
Here for INR check in coumadin clinic in the office. CURRENT COUMADIN DOSE:  7mg daily     CHANGES OR COMPLAINTS:  Mild nose bleeds daily upon waking for past 6 days. Does use Cpap.   Has coughed bloody sputum occassionaly       INR RESULTS TODAY:  1.9 (

## 2020-11-06 NOTE — TELEPHONE ENCOUNTER
----- Message from Vikki Faria sent at 11/6/2020  9:51 AM CST -----  Pt XET:[phone] (home) . Please call back.     Thank you,  Kaylin Brooke

## 2020-11-06 NOTE — PROGRESS NOTES
HPI:    Patient ID: Aiden Eduardo is a 79year old female. HPI     Patient is present with complaints of cough and increased shortness of breath. States has hx of asthma with the cough getting worse.  Has been using her albuterol with minimal reli and one tablet by mouth all other days of the week ) 180 tablet 1   • Mometasone Furoate 0.1 % External Cream rec as needed daily use for skin irritation 45 g 0   • aspirin 81 MG Oral Tab Take 81 mg by mouth daily.      • furosemide 20 MG Oral Tab Take 1 ta tympanic membrane, external ear and ear canal normal.   Nose: Mucosal edema present. Mouth/Throat: Posterior oropharyngeal erythema present. Eyes: Conjunctivae are normal.   Neck: Normal range of motion. Neck supple.    Cardiovascular: Normal rate, regu albuterol inhaler. This is the rescue inhaler and can be used in between the 12 hours between the Symbicort if needed. Recheck in 2 weeks to ensure getting better.          XL#[id number]

## 2020-11-06 NOTE — TELEPHONE ENCOUNTER
Insurance does not cover Elidel(generic). Will cover:    Tacrolimus 1%--Ointment   Protopic Ointment. Please advise change in Rx.

## 2020-11-09 RX ORDER — TACROLIMUS 1 MG/G
1 OINTMENT TOPICAL 2 TIMES DAILY
Qty: 30 G | Refills: 0 | Status: SHIPPED | OUTPATIENT
Start: 2020-11-09 | End: 2021-01-21

## 2020-11-10 ENCOUNTER — ANTI-COAG VISIT (OUTPATIENT)
Dept: FAMILY MEDICINE CLINIC | Facility: CLINIC | Age: 80
End: 2020-11-10
Payer: MEDICARE

## 2020-11-10 ENCOUNTER — OFFICE VISIT (OUTPATIENT)
Dept: FAMILY MEDICINE CLINIC | Facility: CLINIC | Age: 80
End: 2020-11-10
Payer: MEDICARE

## 2020-11-10 VITALS
TEMPERATURE: 98 F | OXYGEN SATURATION: 95 % | BODY MASS INDEX: 50 KG/M2 | HEART RATE: 76 BPM | SYSTOLIC BLOOD PRESSURE: 130 MMHG | DIASTOLIC BLOOD PRESSURE: 80 MMHG | HEIGHT: 58.5 IN | RESPIRATION RATE: 18 BRPM

## 2020-11-10 DIAGNOSIS — Z79.01 LONG TERM (CURRENT) USE OF ANTICOAGULANTS: ICD-10-CM

## 2020-11-10 DIAGNOSIS — E66.01 CLASS 3 SEVERE OBESITY DUE TO EXCESS CALORIES WITH SERIOUS COMORBIDITY AND BODY MASS INDEX (BMI) OF 40.0 TO 44.9 IN ADULT (HCC): ICD-10-CM

## 2020-11-10 DIAGNOSIS — I10 ESSENTIAL HYPERTENSION, BENIGN: ICD-10-CM

## 2020-11-10 DIAGNOSIS — R06.02 SHORTNESS OF BREATH: Primary | ICD-10-CM

## 2020-11-10 DIAGNOSIS — I48.21 PERMANENT ATRIAL FIBRILLATION (HCC): ICD-10-CM

## 2020-11-10 PROCEDURE — 99214 OFFICE O/P EST MOD 30 MIN: CPT | Performed by: NURSE PRACTITIONER

## 2020-11-10 PROCEDURE — 85610 PROTHROMBIN TIME: CPT | Performed by: FAMILY MEDICINE

## 2020-11-10 NOTE — PATIENT INSTRUCTIONS
Keep appointment with pulmonology. No drop in O2 sat with movement. Keep food log - consider [employer].     Increase movement - move with commercials. Start with small walks and increase every other day. Follow-up as needed.

## 2020-11-10 NOTE — PATIENT INSTRUCTIONS
Take coumadin 7mg each day on 11/10/20 and 11/11/20    Dr. Ty Hatfields Lovenox instructions:     Stop Warfarin 5 days prior to procedure on 11/12/20. Start Lovenox injections every 12 hours ( twice daily) on 11/13/20 in the evening.     11/14/20: Lovenox am a

## 2020-11-10 NOTE — TELEPHONE ENCOUNTER
Patient informed of below. Expressed understanding. Patient has already picked up Eric. Mitzi Argueta, 11/10/20, 8:47 AM      Patient requesting Referral to Allergist.  Please advise.   Mitzi Argueta, 11/10/20, 8:48 AM

## 2020-11-10 NOTE — PROGRESS NOTES
Here for INR check in coumadin clinic in the office. Also appointment with Corie VILLASENOR today. CURRENT COUMADIN DOSE:  7mg daily    CHANGES OR COMPLAINTS:  Colonoscopy 11/17/20. Lovenox bridge prescribed by Dr. Jennifer Ansari. INR RESULTS TODAY:  2.

## 2020-11-10 NOTE — PROGRESS NOTES
HPI:    Patient ID: Jolanta Lambert is a 79year old female. HPI     Patient is present with breathing. States that it is better, but still has times with it being hard to breath. Some swelling at times to hands and feet.    States that she can only 90 tablet 2   • FAMOTIDINE 20 MG Oral Tab TAKE 1 TABLET (20 MG TOTAL) BY MOUTH 2 (TWO) TIMES DAILY. 60 tablet 3   • WARFARIN SODIUM 6 MG Oral Tab TAKE 1 TABLET (6MG) BY MOUTH EVERY DAY.  TAKE ADDITIONAL 1MG TABLET ON SUNDAY, TUESDAY, THURSDAY & SATURDAY (Pa and stated that she was done with walking and needed to rest. O2 sat stayed at 95%. Abdominal: Soft. Bowel sounds are normal.   Neurological: She is alert and oriented to person, place, and time. She has normal reflexes. Skin: Skin is warm and dry.

## 2020-11-13 ENCOUNTER — TELEPHONE (OUTPATIENT)
Dept: FAMILY MEDICINE CLINIC | Facility: CLINIC | Age: 80
End: 2020-11-13

## 2020-11-13 NOTE — TELEPHONE ENCOUNTER
exposed to son in law who is positive for COVID, has COVID test set for tomorrow, has medication questions for after

## 2020-11-13 NOTE — TELEPHONE ENCOUNTER
Pt states she and her  were exposed to her son-in-law who tested positive for covid yesterday. They had contact/ exposure on 11/8. Pt has no s/s,  But is scheduled for covid test tomorrow- is having surgery with DR. Raimundo Russ on 11/17.     Pt state

## 2020-11-16 NOTE — TELEPHONE ENCOUNTER
Future appt:     Your appointments     Date & Time Appointment Department [facility])    Nov 20, 2020  9:45 AM CST Anticoagulation Visit with EM[facility] [address]        Nov 24, 2020 10

## 2020-11-17 RX ORDER — IPRATROPIUM BROMIDE AND ALBUTEROL SULFATE 2.5; .5 MG/3ML; MG/3ML
SOLUTION RESPIRATORY (INHALATION)
Qty: 180 ML | Refills: 0 | Status: SHIPPED | OUTPATIENT
Start: 2020-11-17 | End: 2020-12-22

## 2020-11-19 RX ORDER — FLUTICASONE PROPIONATE AND SALMETEROL 250; 50 UG/1; UG/1
1 POWDER RESPIRATORY (INHALATION) EVERY 12 HOURS SCHEDULED
Qty: 1 PACKAGE | Refills: 2 | Status: SHIPPED | OUTPATIENT
Start: 2020-11-19 | End: 2021-04-15

## 2020-11-19 NOTE — TELEPHONE ENCOUNTER
Pt has refill request for Advair 250-50 mcg/dose inh(last filled 10/06/20 for 1pack w/ 2 refills). Last seen in office for CHANDRAKANT w/ Ginny VILLASENOR.     Future Appointments   Date Time Provider Cora Palafox   11/20/2020  9:45 AM EMG [facility] NURSE [facility] [place]

## 2020-11-20 ENCOUNTER — ANTI-COAG VISIT (OUTPATIENT)
Dept: FAMILY MEDICINE CLINIC | Facility: CLINIC | Age: 80
End: 2020-11-20
Payer: MEDICARE

## 2020-11-20 DIAGNOSIS — Z79.01 LONG TERM (CURRENT) USE OF ANTICOAGULANTS: ICD-10-CM

## 2020-11-20 DIAGNOSIS — Z79.01 LONG TERM (CURRENT) USE OF ANTICOAGULANTS: Primary | ICD-10-CM

## 2020-11-20 DIAGNOSIS — I48.21 PERMANENT ATRIAL FIBRILLATION (HCC): ICD-10-CM

## 2020-11-20 PROCEDURE — 85610 PROTHROMBIN TIME: CPT | Performed by: FAMILY MEDICINE

## 2020-11-20 PROCEDURE — 93793 ANTICOAG MGMT PT WARFARIN: CPT | Performed by: FAMILY MEDICINE

## 2020-11-20 RX ORDER — ENOXAPARIN SODIUM 100 MG/ML
INJECTION SUBCUTANEOUS
Qty: 7 SYRINGE | Refills: 0 | Status: SHIPPED | OUTPATIENT
Start: 2020-11-20 | End: 2020-11-23

## 2020-11-20 NOTE — TELEPHONE ENCOUNTER
Held coumadin for colonoscopy with Lovenox bridge. Resumed coumadin 11/17/20. INR today still subtherapeutic at 1.3. Needs refill of Lovenox to [facility]. Would like delivered. Current lovenox dose is 100mg bid. Next INR on Monday 11/23/20.

## 2020-11-20 NOTE — PROGRESS NOTES
Here for INR check in coumadin clinic in the office.     CURRENT COUMADIN DOSE:  8mg M,F and 7mg daily the rest of the week     CHANGES OR COMPLAINTS:  Held coumadin for colonoscopy- resumed 3 days ago  Lovenox  INR RESULTS TODAY:  1.3 ( below goal)    PLAN

## 2020-11-23 ENCOUNTER — ANTI-COAG VISIT (OUTPATIENT)
Dept: FAMILY MEDICINE CLINIC | Facility: CLINIC | Age: 80
End: 2020-11-23
Payer: MEDICARE

## 2020-11-23 DIAGNOSIS — I48.21 PERMANENT ATRIAL FIBRILLATION (HCC): ICD-10-CM

## 2020-11-23 DIAGNOSIS — Z79.01 LONG TERM (CURRENT) USE OF ANTICOAGULANTS: ICD-10-CM

## 2020-11-23 PROCEDURE — 85610 PROTHROMBIN TIME: CPT | Performed by: FAMILY MEDICINE

## 2020-11-23 RX ORDER — ENOXAPARIN SODIUM 100 MG/ML
INJECTION SUBCUTANEOUS
Qty: 3 SYRINGE | Refills: 0 | Status: SHIPPED | OUTPATIENT
Start: 2020-11-24 | End: 2021-01-21

## 2020-11-23 NOTE — PROGRESS NOTES
Message left for patient that Lovenox prescription sent by Dr. Norberto Mora to [facility] beds at the [facility]. Continue Lovenox through next appointment in 3 days.

## 2020-11-23 NOTE — PROGRESS NOTES
Here for INR check in coumadin clinic in the office.     CURRENT COUMADIN DOSE:  10mg F,S and 7mg Sun  ( regular dose is 8mg M,F and 7mg daily the rest of the week)     CHANGES OR COMPLAINTS:  Lovenox bridge post colonoscopy- one syringe left for this eveni

## 2020-11-24 ENCOUNTER — TELEPHONE (OUTPATIENT)
Dept: FAMILY MEDICINE CLINIC | Facility: CLINIC | Age: 80
End: 2020-11-24

## 2020-11-24 NOTE — TELEPHONE ENCOUNTER
ER nursing staff  At [facility] notified and report given. Med list,  Problem list and face sheet faxed to ER.

## 2020-11-24 NOTE — TELEPHONE ENCOUNTER
Patient calling from ED. She states copius bleeding stopped once she got to ED. Now only coughing up pink tinged foamy mucous. No longer coughing up blood.  Per patient they are starting her on an antibiotic for pneumonia and will be discharging her shortly

## 2020-11-24 NOTE — TELEPHONE ENCOUNTER
Patient notified of Dr. Buckner Him. Josephine's instructions. Offered to call 911 for patient and strongly encouraged patient to let me do so. Patient strongly declined. She states she wants her  to drive her.  Advised if bleeding or shortness of breath worseni

## 2020-11-24 NOTE — TELEPHONE ENCOUNTER
Patient currently on warfarin and Lovenox after holding warfarin for colonoscopy on 11/17/20. INR yesterday was 1.8. Patient was to take coumadin 10mg last evening and today and is scheduled for INR tomorrow. Last Lovenox dose was last evening.  She also

## 2020-11-25 ENCOUNTER — TELEPHONE (OUTPATIENT)
Dept: FAMILY MEDICINE CLINIC | Facility: CLINIC | Age: 80
End: 2020-11-25

## 2020-11-25 NOTE — TELEPHONE ENCOUNTER
Pt has INR appt on Friday. Called pt back to review travel screening q's. Pt states she had a negative Covid test on 11/24 (pt ended up in ER- appears that pt was dx with pneumonia) and had negative Covid 11/14- prior to surgery.   Pt had exposed 3 wks

## 2020-11-25 NOTE — TELEPHONE ENCOUNTER
Carlito Baltazar RN 2 hours ago (2:19 PM)        Message left for patient to call to schedule appt with provider in 1-2 weeks          Pt contacted. Pt is not able to come on 12/4. Pt states she is available on 12/9.

## 2020-11-25 NOTE — TELEPHONE ENCOUNTER
Has appointment 11/27 for INR  - Answered Yes to Contact ( was 3 weeks ago)  recent COVID test yesterday was Neg  -   Ok to keep appointment? ?  Please advise

## 2020-11-27 ENCOUNTER — ANTI-COAG VISIT (OUTPATIENT)
Dept: FAMILY MEDICINE CLINIC | Facility: CLINIC | Age: 80
End: 2020-11-27
Payer: MEDICARE

## 2020-11-27 DIAGNOSIS — Z79.01 LONG TERM (CURRENT) USE OF ANTICOAGULANTS: ICD-10-CM

## 2020-11-27 DIAGNOSIS — I48.21 PERMANENT ATRIAL FIBRILLATION (HCC): ICD-10-CM

## 2020-11-27 PROCEDURE — 85610 PROTHROMBIN TIME: CPT | Performed by: FAMILY MEDICINE

## 2020-11-27 NOTE — PROGRESS NOTES
Pt contacted- informed. Pt scheduled for INR f/u on 12/4.     Future Appointments   Date Time Provider Cora Sheldoni   12/4/2020 11:00 AM [facility] [place] NURSE [facility] [place] [facility] [place]   12/9/2020  4:00 PM Juan Vail MD [facility] [place] [facility] [place]

## 2020-11-27 NOTE — PROGRESS NOTES
Pt INR:  2.0. Pt is taking Coumadin 8 mg on Monday and Friday and 7mg rest of days. No bleeding reported at this time.       Pt scheduled for post hospital visit      Future Appointments   Date Time Provider Cora Palafox   12/9/2020  4:00 PM Baud[place]

## 2020-11-27 NOTE — TELEPHONE ENCOUNTER
Pt was here for triage appt. Appt scheduled.     Future Appointments   Date Time Provider [facility]   12/9/2020  4:00 PM Chris Haines MD [facility] [place] [facility]

## 2020-12-03 ENCOUNTER — ANTI-COAG VISIT (OUTPATIENT)
Dept: FAMILY MEDICINE CLINIC | Facility: CLINIC | Age: 80
End: 2020-12-03
Payer: MEDICARE

## 2020-12-03 DIAGNOSIS — Z79.01 LONG TERM (CURRENT) USE OF ANTICOAGULANTS: ICD-10-CM

## 2020-12-03 DIAGNOSIS — I48.21 PERMANENT ATRIAL FIBRILLATION (HCC): ICD-10-CM

## 2020-12-03 PROCEDURE — 85610 PROTHROMBIN TIME: CPT | Performed by: FAMILY MEDICINE

## 2020-12-03 NOTE — PROGRESS NOTES
INR:  2.6  PT:  31.1    Current Coumadin dose:  8 mg Monday and Friday and 7 mg rest of days. Patient does state she accidentally only took 7 mg on Friday instead of 8 mg.      Patient has an upcoming appt with Dr. Gill Craig on Wednesday, 12/9/2020 at 4:00pm.

## 2020-12-09 ENCOUNTER — OFFICE VISIT (OUTPATIENT)
Dept: FAMILY MEDICINE CLINIC | Facility: CLINIC | Age: 80
End: 2020-12-09
Payer: MEDICARE

## 2020-12-09 VITALS
DIASTOLIC BLOOD PRESSURE: 76 MMHG | OXYGEN SATURATION: 98 % | SYSTOLIC BLOOD PRESSURE: 126 MMHG | RESPIRATION RATE: 20 BRPM | WEIGHT: 241 LBS | TEMPERATURE: 99 F | HEIGHT: 58.5 IN | HEART RATE: 90 BPM | BODY MASS INDEX: 49.24 KG/M2

## 2020-12-09 DIAGNOSIS — R06.00 DYSPNEA ON EXERTION: ICD-10-CM

## 2020-12-09 DIAGNOSIS — I27.20 PULMONARY HYPERTENSION (HCC): ICD-10-CM

## 2020-12-09 DIAGNOSIS — J45.40 MODERATE PERSISTENT ASTHMA, UNSPECIFIED WHETHER COMPLICATED: ICD-10-CM

## 2020-12-09 DIAGNOSIS — R91.1 NODULE OF LOWER LOBE OF LEFT LUNG: ICD-10-CM

## 2020-12-09 DIAGNOSIS — I48.21 PERMANENT ATRIAL FIBRILLATION (HCC): Primary | ICD-10-CM

## 2020-12-09 DIAGNOSIS — G47.33 OBSTRUCTIVE SLEEP APNEA: ICD-10-CM

## 2020-12-09 DIAGNOSIS — I25.10 ATHEROSCLEROSIS OF NATIVE CORONARY ARTERY OF NATIVE HEART WITHOUT ANGINA PECTORIS: ICD-10-CM

## 2020-12-09 DIAGNOSIS — I10 ESSENTIAL HYPERTENSION, BENIGN: ICD-10-CM

## 2020-12-09 PROBLEM — L03.115 CELLULITIS OF RIGHT LOWER EXTREMITY: Status: ACTIVE | Noted: 2020-09-24

## 2020-12-09 PROBLEM — R59.0 MEDIASTINAL LYMPHADENOPATHY: Status: ACTIVE | Noted: 2020-10-20

## 2020-12-09 PROBLEM — J45.909 ASTHMA: Status: ACTIVE | Noted: 2020-10-20

## 2020-12-09 PROBLEM — N63.20 BREAST MASS, LEFT: Status: ACTIVE | Noted: 2020-09-24

## 2020-12-09 PROBLEM — R06.02 SHORTNESS OF BREATH: Status: ACTIVE | Noted: 2020-10-09

## 2020-12-09 PROCEDURE — 1111F DSCHRG MED/CURRENT MED MERGE: CPT | Performed by: FAMILY MEDICINE

## 2020-12-09 PROCEDURE — 99215 OFFICE O/P EST HI 40 MIN: CPT | Performed by: FAMILY MEDICINE

## 2020-12-09 NOTE — PROGRESS NOTES
Precious White is a 79year old female.    Patient presents with:  Hospital F/U: Pt was in the hospital for respiratory issues       HPI:   Patient presents for recheck of her status- post hospital    11/24/20- coughing up blood-- improving oneumonia, Apply 1 Application topically 2 (two) times daily. 28 g 1   • LISINOPRIL 5 MG Oral Tab TAKE 1 TABLET (5 MG TOTAL) BY MOUTH DAILY.  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 1. Permanent atrial fibrillation [facility])  Reviewed with patient. Continue current medical therapy. I discussed with her consultation with Dr. Fawad Vela.     2. Pulmonary hypertension (HCC)  Unchanged encouraged her readi

## 2020-12-11 NOTE — TELEPHONE ENCOUNTER
Future appt:     Your appointments     Date & Time Appointment Department [facility])    Dec 16, 2020 10:45 AM CST Anticoagulation Visit with EMG [address], Liset Leyva ([place])

## 2020-12-16 ENCOUNTER — ANTI-COAG VISIT (OUTPATIENT)
Dept: FAMILY MEDICINE CLINIC | Facility: CLINIC | Age: 80
End: 2020-12-16
Payer: MEDICARE

## 2020-12-16 DIAGNOSIS — Z79.01 LONG TERM (CURRENT) USE OF ANTICOAGULANTS: ICD-10-CM

## 2020-12-16 DIAGNOSIS — I48.21 PERMANENT ATRIAL FIBRILLATION (HCC): ICD-10-CM

## 2020-12-16 PROCEDURE — 93793 ANTICOAG MGMT PT WARFARIN: CPT | Performed by: FAMILY MEDICINE

## 2020-12-16 PROCEDURE — 85610 PROTHROMBIN TIME: CPT | Performed by: FAMILY MEDICINE

## 2020-12-16 NOTE — PROGRESS NOTES
Here for INR check in coumadin clinic in the office.     CURRENT COUMADIN DOSE:  8mg M,F and 7mg daily the rest of the week    CHANGES OR COMPLAINTS:  Ate extra vegetables  Planning consult with Dr. Sascha Junior for afib per Dr. Medardo Brown    INR RESULTS TODAY:  2.0

## 2020-12-22 RX ORDER — IPRATROPIUM BROMIDE AND ALBUTEROL SULFATE 2.5; .5 MG/3ML; MG/3ML
SOLUTION RESPIRATORY (INHALATION)
Qty: 180 ML | Refills: 0 | Status: SHIPPED | OUTPATIENT
Start: 2020-12-22 | End: 2021-01-11

## 2020-12-22 NOTE — TELEPHONE ENCOUNTER
Future appt:     Your appointments     Date & Time Appointment Department [facility])    Jan 13, 2021 10:30 AM CST Anticoagulation Visit with KEVIN [address], Lis[place] Boyer ([place])

## 2021-01-04 ENCOUNTER — TELEPHONE (OUTPATIENT)
Dept: FAMILY MEDICINE CLINIC | Facility: CLINIC | Age: 81
End: 2021-01-04

## 2021-01-04 NOTE — TELEPHONE ENCOUNTER
Patient states for the past couple days she has been having cramping in her L leg. States about 15 mins ago the pain was really bad. States she couldn't walk, sit, or lay it hurt so bad. States right now she is sitting with a heating blanket on her leg.

## 2021-01-04 NOTE — TELEPHONE ENCOUNTER
Patient informed of the below recommendations. Patient states there isn't really anything to look at but she is willing to see a provider while here tomorrow to have her INR checked. Appt scheduled.      Future Appointments   Date Time Provider Department

## 2021-01-04 NOTE — TELEPHONE ENCOUNTER
Yes, it is okay for her to have an INR early–although, she should probably have someone look at her leg–not sure if there are any appointments available in the office tonight–if severe pain go to the [facility] if any shortness of breath, chest pain, severe h

## 2021-01-05 ENCOUNTER — ANTI-COAG VISIT (OUTPATIENT)
Dept: FAMILY MEDICINE CLINIC | Facility: CLINIC | Age: 81
End: 2021-01-05

## 2021-01-05 ENCOUNTER — TELEPHONE (OUTPATIENT)
Dept: FAMILY MEDICINE CLINIC | Facility: CLINIC | Age: 81
End: 2021-01-05

## 2021-01-05 ENCOUNTER — OFFICE VISIT (OUTPATIENT)
Dept: FAMILY MEDICINE CLINIC | Facility: CLINIC | Age: 81
End: 2021-01-05
Payer: MEDICARE

## 2021-01-05 VITALS
HEIGHT: 58.5 IN | BODY MASS INDEX: 48.01 KG/M2 | OXYGEN SATURATION: 95 % | SYSTOLIC BLOOD PRESSURE: 134 MMHG | TEMPERATURE: 99 F | HEART RATE: 60 BPM | RESPIRATION RATE: 22 BRPM | DIASTOLIC BLOOD PRESSURE: 80 MMHG | WEIGHT: 235 LBS

## 2021-01-05 DIAGNOSIS — Z79.01 LONG TERM (CURRENT) USE OF ANTICOAGULANTS: ICD-10-CM

## 2021-01-05 DIAGNOSIS — R60.0 EDEMA OF LEFT LOWER LEG: ICD-10-CM

## 2021-01-05 DIAGNOSIS — R25.2 LEG CRAMPS: Primary | ICD-10-CM

## 2021-01-05 DIAGNOSIS — I48.21 PERMANENT ATRIAL FIBRILLATION (HCC): ICD-10-CM

## 2021-01-05 LAB — INR: 2.3 (ref 0.8–1.2)

## 2021-01-05 PROCEDURE — 85610 PROTHROMBIN TIME: CPT | Performed by: FAMILY MEDICINE

## 2021-01-05 PROCEDURE — 99214 OFFICE O/P EST MOD 30 MIN: CPT | Performed by: NURSE PRACTITIONER

## 2021-01-05 NOTE — PATIENT INSTRUCTIONS
Follow up for ultrasound of your left leg at [facility] and blood work     Drink Gatorade -  Follow up for blood work - and holter monitor     Follow up with Dr. Ida Felton - for sleep evaluation      Follow up with Dr. Elizabeth Monroy in June.

## 2021-01-05 NOTE — PROGRESS NOTES
Marco Kang is a 79year old female. Patient presents with:  Cramps: Left leg cramping. swelling. has been going on for awhile. HPI:   Complaints of pain to left lower leg - severe cramps.    Patient is on coumadin for a-fib  INR today 2.3 - lung     Pulmonary hypertension (HCC)     Cellulitis of right lower extremity    Current Outpatient Medications   Medication Sig Dispense Refill   • IPRATROPIUM-ALBUTEROL 0.5-2.5 (3) MG/3ML Inhalation Solution TAKE 1 VIAL (3 ML) BY NEBULIZATION EVERY 4 (FO total dose to 7 mg daily (Patient taking differently: Take 2 mg by mouth daily.  2 tablets by mouth on M,F and one tablet by mouth all other days of the week ) 180 tablet 1   • Mometasone Furoate 0.1 % External Cream rec as needed daily use for skin irritat otherwise  HEENT: denies complaints  SKIN: denies any unusual skin lesions or rashes  RESPIRATORY: denies shortness of breath with exertion, no cough  CARDIOVASCULAR: denies complaints   GI: denies abdominal pain, nausea, vomiting, diarrhea   MUSCULOSKELET

## 2021-01-05 NOTE — TELEPHONE ENCOUNTER
Spoke with ultrasound tech \"Higinio\" at [place] [facility] - us is negative for DVT–discussed with patient to elevate her leg, wear support stocking, she may call her cardiologist for further advice on swelling.   Follow-up for Holter monitor as planned and blood

## 2021-01-05 NOTE — PROGRESS NOTES
Here for Appointment with Nisha VILLASENOR for left lower leg pain. INR requested. CURRENT COUMADIN DOSE:  8mg M,F and 7mg daily the rest of the week     CHANGES OR COMPLAINTS:  Left lower leg pain  Blood tinged mucous with blowing nose.    Patient

## 2021-01-06 ENCOUNTER — TELEPHONE (OUTPATIENT)
Dept: FAMILY MEDICINE CLINIC | Facility: CLINIC | Age: 81
End: 2021-01-06

## 2021-01-06 DIAGNOSIS — E87.1 HYPONATREMIA: Primary | ICD-10-CM

## 2021-01-06 LAB
A/G RATIO: 1.5
ALBUMIN: 4 G/DL
ALKALINE PHOSPHATASE: 61
ALT: 15
ANION GAP: 8
AST: 20
BILIRUBIN, TOTAL: 0.8 MG/DL
BUN/CREATININE RATIO: 27.9
BUN: 19
CALCIUM: 9.2
CHLORIDE: 99
CO2: 25
CREATININE: 0.68 MG/DL
EGFR IF NONAFRICN AM: 89
GLOBULIN: 2.7
GLUCOSE: 105
POTASSIUM: 4
SODIUM: 132
TOTAL PROTEIN: 6.7

## 2021-01-06 NOTE — TELEPHONE ENCOUNTER
----- Message from HAMILTON King sent at 1/6/2021  3:56 PM CST -----  Please notify patient that her sodium is low would recommend increasing foods rich in sodium such as soup broth–and Stephania  Repeat blood work on Monday. –If patient has symptoms

## 2021-01-06 NOTE — TELEPHONE ENCOUNTER
Patient informed of the below results and recommendations. Patient denies having any symptoms at this time. Patient states she had the Holter Monitor placed today. Lab appt scheduled Wednesday to coordinate with her INR appt.   Please advise lab order

## 2021-01-11 RX ORDER — IPRATROPIUM BROMIDE AND ALBUTEROL SULFATE 2.5; .5 MG/3ML; MG/3ML
3 SOLUTION RESPIRATORY (INHALATION) EVERY 4 HOURS PRN
Qty: 180 ML | Refills: 3 | Status: SHIPPED | OUTPATIENT
Start: 2021-01-11 | End: 2021-08-30 | Stop reason: ALTCHOICE

## 2021-01-11 NOTE — TELEPHONE ENCOUNTER
Future appt: Your appointments     Date & Time Appointment Department [facility])    Jan 13, 2021 10:45 AM CST Laboratory Visit with REF [facility] ([facility] [place])        Jan 21, 2021  4:20 PM CST Sleep Follow Up with Nasir Trujillo MD [facility] [address] [facility] [place])        Feb 02, 2021  9:30 AM CST Anticoagulation Visit with EMG COUMADIN [address] [place] ([facility])        Jun 08, 2021 10:30 AM CDT Follow Up Visit with Nicanor Hebert MD [facility] ([facility])            25 [facility], [address] [place] [id number]  [address] [facility] [address] [facility] [address] [phone]        Last Appointment with provider:   12/9/20 Respiratory follow up  Last appointment at [facility]:  1/5/2021  Cholesterol, Total (mg/dL)   Date Value   09/19/2020 149     HDL Cholesterol (mg/dL)   Date Value   09/19/2020 41     LDL Cholesterol (mg/dL)   Date Value   09/19/2020 90     Triglycerides (mg/dL)   Date Value   09/19/2020 88     No results found for: EAG, A1C  Lab Results   Component Value Date    T4F 1.2 09/19/2020    TSH 2.670 09/19/2020       No follow-ups on file.

## 2021-01-12 ENCOUNTER — TELEPHONE (OUTPATIENT)
Dept: FAMILY MEDICINE CLINIC | Facility: CLINIC | Age: 81
End: 2021-01-12

## 2021-01-12 NOTE — TELEPHONE ENCOUNTER
Pt should wait for labs pending covid test result, alternative is to have done at [facility] if needed before procedure. .   From my review- pt can wait on labs til after procedure/ covid results

## 2021-01-12 NOTE — TELEPHONE ENCOUNTER
Pt has a pending Covid test that she had today done as a precaution before a procedure. Lab appt ok for tomorrow?     Your appointments     Date & Time Appointment Department [facility])    Jan 13, 2021 10:45 AM CST Laboratory Visit with REF Maribel Barlow Re

## 2021-01-12 NOTE — TELEPHONE ENCOUNTER
Pt states she is having a pulmonary procedure at [facility] on Thursday per Dr. Fabián Campos. Pt states she had covid test done today per hospital protocol. Pt denies having any s/s.     Pt states she has lab orders that she needs to have done, ordered per VALERIE valdovinos

## 2021-01-15 RX ORDER — LISINOPRIL 5 MG/1
5 TABLET ORAL DAILY
Qty: 90 TABLET | Refills: 0 | Status: SHIPPED | OUTPATIENT
Start: 2021-01-15 | End: 2021-04-14

## 2021-01-15 RX ORDER — FAMOTIDINE 20 MG/1
20 TABLET ORAL 2 TIMES DAILY
Qty: 60 TABLET | Refills: 3 | Status: SHIPPED | OUTPATIENT
Start: 2021-01-15 | End: 2021-03-02

## 2021-01-15 NOTE — TELEPHONE ENCOUNTER
Future appt:     Your appointments     Date & Time Appointment Department [facility])    Jan 21, 2021  4:20 PM CST Sleep Follow Up with Wilian Willis MD [address] [facility], [place]y [place] ([facility])        Feb 02, 2021  9:30 AM

## 2021-01-20 ENCOUNTER — TELEPHONE (OUTPATIENT)
Dept: FAMILY MEDICINE CLINIC | Facility: CLINIC | Age: 81
End: 2021-01-20

## 2021-01-20 NOTE — TELEPHONE ENCOUNTER
when confirming her appointment for tomorrow she answered yes to COVID test in the last 14 days, negative

## 2021-01-20 NOTE — TELEPHONE ENCOUNTER
Pt had a negative covid test on 1/12/21. Pt had a lung study on 1/14/21 at [place]. Pt denies any current concerns. Pt has appt with Dr. Curt Cross tomorrow. 1/21/21. Pt advised to keep her appt.     Future Appointments   Date Time Provider Cora Palafox   1/2

## 2021-01-21 ENCOUNTER — OFFICE VISIT (OUTPATIENT)
Dept: FAMILY MEDICINE CLINIC | Facility: CLINIC | Age: 81
End: 2021-01-21
Payer: MEDICARE

## 2021-01-21 VITALS
BODY MASS INDEX: 46.1 KG/M2 | WEIGHT: 225.63 LBS | HEART RATE: 94 BPM | DIASTOLIC BLOOD PRESSURE: 72 MMHG | RESPIRATION RATE: 16 BRPM | SYSTOLIC BLOOD PRESSURE: 108 MMHG | TEMPERATURE: 99 F | HEIGHT: 58.5 IN | OXYGEN SATURATION: 95 %

## 2021-01-21 DIAGNOSIS — G47.33 OSA (OBSTRUCTIVE SLEEP APNEA): Primary | ICD-10-CM

## 2021-01-21 DIAGNOSIS — E66.01 CLASS 3 SEVERE OBESITY DUE TO EXCESS CALORIES WITH SERIOUS COMORBIDITY AND BODY MASS INDEX (BMI) OF 40.0 TO 44.9 IN ADULT (HCC): ICD-10-CM

## 2021-01-21 DIAGNOSIS — I27.20 PULMONARY HYPERTENSION (HCC): ICD-10-CM

## 2021-01-21 PROCEDURE — 99214 OFFICE O/P EST MOD 30 MIN: CPT | Performed by: FAMILY MEDICINE

## 2021-01-21 RX ORDER — ALCLOMETASONE DIPROPIONATE 0.5 MG/G
1 CREAM TOPICAL 2 TIMES DAILY
COMMUNITY
Start: 2021-01-05

## 2021-01-21 RX ORDER — METOPROLOL SUCCINATE 50 MG/1
TABLET, EXTENDED RELEASE ORAL EVERY EVENING
Qty: 180 TABLET | Refills: 2 | COMMUNITY
Start: 2021-01-21

## 2021-01-21 RX ORDER — POTASSIUM CHLORIDE 750 MG/1
20 TABLET, EXTENDED RELEASE ORAL DAILY
COMMUNITY
Start: 2021-01-21 | End: 2021-03-02 | Stop reason: DRUGHIGH

## 2021-01-21 RX ORDER — FUROSEMIDE 20 MG/1
40 TABLET ORAL DAILY
COMMUNITY
Start: 2021-01-21 | End: 2021-03-02

## 2021-01-21 NOTE — PROGRESS NOTES
[facility]        HPI:   This is a 79year old female coming in for Patient presents with:  Obstructive Sleep Apnea (NUVIA)      HPI: she is having some difficulty with her lungs.    She is seeing dr. Carbajal Bachelor through n - -   PLM INDEX (/hr) - -   SS Facility - -   Sleep EFFC (%) - -   Sleep REM (%) - -   TOT Sleep TM (min) - -       Lab Results   Component Value Date    IRON 81 09/19/2020    TRANSFERRIN 315 09/19/2020    TIBCP 469 (H) 09/19/2020    SAT 17 09/19/2020 topically 2 (two) times daily. • Metoprolol Succinate ER 50 MG Oral Tablet 24 Hr 100mg in the morning and 50mg at night 180 tablet 2   • furosemide 20 MG Oral Tab Take 2 tablets (40 mg total) by mouth daily.      • Potassium Chloride ER 10 MEQ Oral Tab given: Not Answered         Problem List:  Patient Active Problem List:     Long term (current) use of anticoagulants [Z79.01]     Permanent atrial fibrillation (HCC)     Abnormal mammogram     Tubular adenoma of colon     Eczema     Gastroesophageal reflu 108/72 (BP Location: Left arm, Patient Position: Sitting, Cuff Size: large)   Pulse 94   Temp 99.4 °F (37.4 °C) (Temporal)   Resp 16   Ht 4' 10.5\" (1.486 m)   Wt 225 lb 9.6 oz (102.3 kg)   SpO2 95%   BMI 46.35 kg/m²  Estimated body mass index is 46.35 kg/ PLAN:   1. NUVIA (obstructive sleep apnea)   increase pressure to auto. 13-16  Call iwith issues,   If not improving then consider bipap titration due to respiratory dysfunction. Recheck in 2 months.        2. Pulmonary hypertension (HCC)   Stable , continu utilizing Dragon speech recognition software. Please excuse any grammatical errors. Call my office if you have any questions regarding this note.  \"     Sabra Tamayo MD  1/21/2021  4:38 PM

## 2021-01-21 NOTE — PATIENT INSTRUCTIONS
Dr. Stephen Renner's \" The immune system recovery plan\". Trial of avoidance of gluten, dairy, soy, corn and sugar x 3 months.

## 2021-01-22 RX ORDER — WARFARIN SODIUM 1 MG/1
TABLET ORAL
Qty: 180 TABLET | Refills: 1 | Status: SHIPPED | OUTPATIENT
Start: 2021-01-22 | End: 2021-10-14

## 2021-01-22 NOTE — TELEPHONE ENCOUNTER
Future appt:     Your appointments     Date & Time Appointment Department [facility])    Feb 02, 2021  9:30 AM CST Anticoagulation Visit with KEVIN [address],[place] ([place])        Jun 08, 2021 10

## 2021-01-26 DIAGNOSIS — Z23 NEED FOR VACCINATION: ICD-10-CM

## 2021-02-02 ENCOUNTER — ANTI-COAG VISIT (OUTPATIENT)
Dept: FAMILY MEDICINE CLINIC | Facility: CLINIC | Age: 81
End: 2021-02-02
Payer: MEDICARE

## 2021-02-02 DIAGNOSIS — I48.21 PERMANENT ATRIAL FIBRILLATION (HCC): ICD-10-CM

## 2021-02-02 DIAGNOSIS — Z79.01 LONG TERM (CURRENT) USE OF ANTICOAGULANTS: ICD-10-CM

## 2021-02-02 LAB — INR: 2.8 (ref 0.8–1.2)

## 2021-02-02 PROCEDURE — 93793 ANTICOAG MGMT PT WARFARIN: CPT | Performed by: FAMILY MEDICINE

## 2021-02-02 PROCEDURE — 85610 PROTHROMBIN TIME: CPT | Performed by: FAMILY MEDICINE

## 2021-02-02 NOTE — PROGRESS NOTES
Here for INR check in coumadin clinic in the office.     CURRENT COUMADIN DOSE:  8mg M,F and 7mg daily the rest of the week     CHANGES OR COMPLAINTS:  No changes    INR RESULTS TODAY:  2.8 ( in goal)     PLAN:  CPM coumadin   Next INR due in one month  Elyssa

## 2021-02-11 ENCOUNTER — TELEPHONE (OUTPATIENT)
Dept: FAMILY MEDICINE CLINIC | Facility: CLINIC | Age: 81
End: 2021-02-11

## 2021-02-19 ENCOUNTER — TELEPHONE (OUTPATIENT)
Dept: FAMILY MEDICINE CLINIC | Facility: CLINIC | Age: 81
End: 2021-02-19

## 2021-02-19 NOTE — TELEPHONE ENCOUNTER
Had [address] vaccine on 2/16/2021 @ [facility]. FYI - only call with questions or problems.

## 2021-02-22 ENCOUNTER — TELEPHONE (OUTPATIENT)
Dept: FAMILY MEDICINE CLINIC | Facility: CLINIC | Age: 81
End: 2021-02-22

## 2021-02-22 ENCOUNTER — ANTI-COAG VISIT (OUTPATIENT)
Dept: FAMILY MEDICINE CLINIC | Facility: CLINIC | Age: 81
End: 2021-02-22
Payer: MEDICARE

## 2021-02-22 DIAGNOSIS — I48.21 PERMANENT ATRIAL FIBRILLATION (HCC): ICD-10-CM

## 2021-02-22 DIAGNOSIS — Z79.01 LONG TERM (CURRENT) USE OF ANTICOAGULANTS: ICD-10-CM

## 2021-02-22 LAB — INR: 2.5 (ref 0.8–1.2)

## 2021-02-22 PROCEDURE — 85610 PROTHROMBIN TIME: CPT | Performed by: FAMILY MEDICINE

## 2021-02-22 PROCEDURE — 93793 ANTICOAG MGMT PT WARFARIN: CPT | Performed by: FAMILY MEDICINE

## 2021-02-22 NOTE — TELEPHONE ENCOUNTER
Patient had laboratory studies showing low sodium in January. Does not feel that she has had that followed up since then. Patient last seen by myself in December.   She was seen by nurse practitioner in January and Dr. Ferrer since that time for evaluation

## 2021-02-22 NOTE — TELEPHONE ENCOUNTER
Severe leg cramps have returned the last few nights. Waking up with legs and feet twisting in spasm. No redness or edema to legs. She almost called 911 last night due to severity of pain.  Not sleeping much due to the frequent cramping and taking so long fo

## 2021-02-22 NOTE — PROGRESS NOTES
Here for INR check in coumadin clinic in the office.     CURRENT COUMADIN DOSE:  8mg M,F and 7mg daily the rest of the week    CHANGES OR COMPLAINTS:  Leg cramps at night  No leg swelling or redness noted    INR RESULTS TODAY:  2.5 ( in goal)    PLAN:  ALMA ROSA

## 2021-02-23 NOTE — TELEPHONE ENCOUNTER
Spoke with pt. Pt states she has leg cramps off/on. Pt offered appt.     Pt scheduled appt next week    Future Appointments   Date Time Provider Cora Palafox   3/2/2021 11:30 José Manuel Vick MD [facility] [place] [facility]   3/22/2021 11:15 AM EM

## 2021-03-02 ENCOUNTER — TELEPHONE (OUTPATIENT)
Dept: FAMILY MEDICINE CLINIC | Facility: CLINIC | Age: 81
End: 2021-03-02

## 2021-03-02 ENCOUNTER — OFFICE VISIT (OUTPATIENT)
Dept: FAMILY MEDICINE CLINIC | Facility: CLINIC | Age: 81
End: 2021-03-02
Payer: MEDICARE

## 2021-03-02 VITALS
HEIGHT: 58.5 IN | SYSTOLIC BLOOD PRESSURE: 102 MMHG | WEIGHT: 215.81 LBS | DIASTOLIC BLOOD PRESSURE: 78 MMHG | OXYGEN SATURATION: 93 % | BODY MASS INDEX: 44.09 KG/M2 | HEART RATE: 68 BPM | TEMPERATURE: 98 F | RESPIRATION RATE: 18 BRPM

## 2021-03-02 DIAGNOSIS — E55.9 VITAMIN D DEFICIENCY: ICD-10-CM

## 2021-03-02 DIAGNOSIS — G47.33 OSA (OBSTRUCTIVE SLEEP APNEA): ICD-10-CM

## 2021-03-02 DIAGNOSIS — I48.21 PERMANENT ATRIAL FIBRILLATION (HCC): ICD-10-CM

## 2021-03-02 DIAGNOSIS — E53.8 LOW SERUM VITAMIN B12: ICD-10-CM

## 2021-03-02 DIAGNOSIS — I10 ESSENTIAL HYPERTENSION, BENIGN: ICD-10-CM

## 2021-03-02 DIAGNOSIS — E78.49 FAMILIAL MULTIPLE LIPOPROTEIN-TYPE HYPERLIPIDEMIA: ICD-10-CM

## 2021-03-02 DIAGNOSIS — R25.2 LEG CRAMPS: Primary | ICD-10-CM

## 2021-03-02 PROCEDURE — 99214 OFFICE O/P EST MOD 30 MIN: CPT | Performed by: FAMILY MEDICINE

## 2021-03-02 RX ORDER — FUROSEMIDE 20 MG/1
20 TABLET ORAL
COMMUNITY

## 2021-03-02 RX ORDER — FAMOTIDINE 20 MG/1
20 TABLET ORAL 2 TIMES DAILY
Qty: 60 TABLET | Refills: 3 | COMMUNITY
Start: 2021-03-02 | End: 2021-04-15

## 2021-03-02 RX ORDER — FUROSEMIDE 40 MG/1
40 TABLET ORAL EVERY MORNING
COMMUNITY

## 2021-03-02 RX ORDER — METOPROLOL SUCCINATE 100 MG/1
100 TABLET, EXTENDED RELEASE ORAL EVERY MORNING
COMMUNITY

## 2021-03-02 RX ORDER — POTASSIUM CHLORIDE 1.5 G/1.77G
20 POWDER, FOR SOLUTION ORAL DAILY
COMMUNITY

## 2021-03-02 NOTE — TELEPHONE ENCOUNTER
Pt seen today,  Per CR request- Higinio Soares was contacted earlier-  Requested med list- pt has pill pack set up by pharmacy. Pt med list received and reviewed. Alclometasone Cream, Proair, Duoneb, and Advair note included on pill pack med list.  Updated.

## 2021-03-02 NOTE — PATIENT INSTRUCTIONS
Continue medications per pill packs    rec fast labs    Encourage walking       Future Appointments   Date Time Provider Cora Palafox   3/22/2021 11:15 AM EMG [facility] NURSE [facility] [place] [facility] [place]   4/15/2021  9:30 AM Andrew Lua MD [facility] [place]

## 2021-03-02 NOTE — PROGRESS NOTES
[facility] [place]  PROGRESS NOTE  Chief Complaint:   Patient presents with:  Cramps: both legs, left is worse, started about a year ago with getting worse, also has nodes? under arms and thighs      HPI:   This is a 79year old female coming in History      Marital status:       Spouse name: Not on file      Number of children: Not on file      Years of education: Not on file      Highest education level: Not on file    Occupational History      Occupation: Retired    Tobacco Use      Smok PUFFS INTO THE LUNGS EVERY 4-6 HOURS AS NEEDED 3 Inhaler 0   • fluticasone-salmeterol 250-50 MCG/DOSE Inhalation Aerosol Powder, Breath Activated Inhale 1 puff into the lungs every 12 (twelve) hours.  1 Package 2   • Methylcobalamin 1 MG Oral Chew Tab Chew Denies allergic response, history of asthma, sneezing, hives, eczema or rhinitis.      EXAM:   /78 (BP Location: Left arm, Patient Position: Sitting, Cuff Size: adult)   Pulse 68   Temp 98.1 °F (36.7 °C) (Temporal)   Resp 18   Ht 4' 10.5\" (1.486 m) due for laboratory studies    2. Familial multiple lipoprotein-type hyperlipidemia  Patient asymptomatic tolerating present medication  - CBC WITH DIFFERENTIAL WITH PLATELET; Future  - COMP METABOLIC PANEL (14); Future  - IRON AND TIBC;  Future  - LIPID PAN Tubular adenoma of colon     Eczema     Gastroesophageal reflux disease     Diaphragmatic hernia     Familial multiple lipoprotein-type hyperlipidemia     Obstructive sleep apnea     Osteoarthrosis     Periodic limb movement disorder     Rectocele     Othe Outcome: Patient verbalizes understanding. Patient is notified to call with any questions, complications, allergies, or worsening or changing symptoms. Patient is to call with any side effects or complications from the treatments as a result of today.

## 2021-03-22 ENCOUNTER — ANTI-COAG VISIT (OUTPATIENT)
Dept: FAMILY MEDICINE CLINIC | Facility: CLINIC | Age: 81
End: 2021-03-22
Payer: MEDICARE

## 2021-03-22 DIAGNOSIS — I48.21 PERMANENT ATRIAL FIBRILLATION (HCC): ICD-10-CM

## 2021-03-22 DIAGNOSIS — Z79.01 LONG TERM (CURRENT) USE OF ANTICOAGULANTS: ICD-10-CM

## 2021-03-22 LAB — INR: 2.6 (ref 0.8–1.2)

## 2021-03-22 PROCEDURE — 93793 ANTICOAG MGMT PT WARFARIN: CPT | Performed by: FAMILY MEDICINE

## 2021-03-22 PROCEDURE — 85610 PROTHROMBIN TIME: CPT | Performed by: FAMILY MEDICINE

## 2021-03-22 NOTE — PROGRESS NOTES
INR: 2.6,  Protime: 31.3. Pt is taking Coumadin 8 mg on Mon/Friday and 7 mg rest of days. Pt c/o left elbow pain, pt states she will schedule appt if needed. Otherwise no change in diet, medication, or current status reported.     CR out of office,

## 2021-03-22 NOTE — PROGRESS NOTES
INR is 2.6. This is well within the normal range. Plan: Continue the same dose of warfarin. Recheck in 1 month. Please ask her to schedule an appointment to have the elbow pain evaluated.

## 2021-03-23 ENCOUNTER — OFFICE VISIT (OUTPATIENT)
Dept: FAMILY MEDICINE CLINIC | Facility: CLINIC | Age: 81
End: 2021-03-23
Payer: MEDICARE

## 2021-03-23 VITALS
SYSTOLIC BLOOD PRESSURE: 114 MMHG | WEIGHT: 210.38 LBS | RESPIRATION RATE: 18 BRPM | HEIGHT: 58.5 IN | BODY MASS INDEX: 42.98 KG/M2 | HEART RATE: 81 BPM | DIASTOLIC BLOOD PRESSURE: 58 MMHG | OXYGEN SATURATION: 95 % | TEMPERATURE: 97 F

## 2021-03-23 DIAGNOSIS — I25.10 ATHEROSCLEROSIS OF NATIVE CORONARY ARTERY OF NATIVE HEART WITHOUT ANGINA PECTORIS: ICD-10-CM

## 2021-03-23 DIAGNOSIS — I10 ESSENTIAL HYPERTENSION, BENIGN: ICD-10-CM

## 2021-03-23 DIAGNOSIS — M70.22 OLECRANON BURSITIS OF LEFT ELBOW: Primary | ICD-10-CM

## 2021-03-23 PROCEDURE — 99214 OFFICE O/P EST MOD 30 MIN: CPT | Performed by: FAMILY MEDICINE

## 2021-03-23 NOTE — PROGRESS NOTES
Rosetta Martin is a 79year old female. Patient presents with:  Elbow Pain: left elbow throbs off and on for past week      HPI:   Patient presents for evaluation of left elbow pain. Pt with sore left elbow- 1-2 weeks.  No falls or injury, no hot o MCG (2000 UT) Oral Cap Take 2,000 Units by mouth daily. • WARFARIN SODIUM 6 MG Oral Tab TAKE 1 TABLET (6MG) BY MOUTH EVERY DAY.  TAKE ADDITIONAL 1MG TABLET ON SUNDAY, TUESDAY, THURSDAY & SATURDAY (Patient taking differently: One tablet by moutn daily ) denies heartburn  NEURO: denies headaches    EXAM:   /58 (BP Location: Left arm, Patient Position: Sitting, Cuff Size: adult)   Pulse 81   Temp 97.2 °F (36.2 °C) (Temporal)   Resp 18   Ht 4' 10.5\" (1.486 m)   Wt 210 lb 6.4 oz (95.4 kg)   SpO2 95%

## 2021-03-23 NOTE — PATIENT INSTRUCTIONS
rec rest, ice 1-2 x a day  rec bengay or Voltaran cream to left elbow 2 x a day    F/u Dr/ MOck - re sleep-- re mask use

## 2021-03-29 ENCOUNTER — TELEPHONE (OUTPATIENT)
Dept: FAMILY MEDICINE CLINIC | Facility: CLINIC | Age: 81
End: 2021-03-29

## 2021-03-29 NOTE — TELEPHONE ENCOUNTER
Spoke with Adeline Parks at [facility]. Adeline Parks is working on pt pill pack. Adeline Parks is checking on pt Warfarin dose. Pt had INR 3/22. Current dose:  Pt is taking Warfarin 8 mg on Monday and Friday and 7 mg the rest of the days. No dose change reported.   Rui Sanders

## 2021-04-06 ENCOUNTER — LAB ENCOUNTER (OUTPATIENT)
Dept: LAB | Age: 81
End: 2021-04-06
Attending: FAMILY MEDICINE
Payer: MEDICARE

## 2021-04-06 DIAGNOSIS — I10 ESSENTIAL HYPERTENSION, BENIGN: ICD-10-CM

## 2021-04-06 DIAGNOSIS — G47.61 PLMD (PERIODIC LIMB MOVEMENT DISORDER): ICD-10-CM

## 2021-04-06 DIAGNOSIS — E53.8 LOW SERUM VITAMIN B12: ICD-10-CM

## 2021-04-06 DIAGNOSIS — E78.49 FAMILIAL MULTIPLE LIPOPROTEIN-TYPE HYPERLIPIDEMIA: ICD-10-CM

## 2021-04-06 DIAGNOSIS — E87.1 HYPONATREMIA: ICD-10-CM

## 2021-04-06 DIAGNOSIS — E55.9 VITAMIN D DEFICIENCY: ICD-10-CM

## 2021-04-06 DIAGNOSIS — R25.2 LEG CRAMPS: ICD-10-CM

## 2021-04-06 DIAGNOSIS — G47.33 OSA (OBSTRUCTIVE SLEEP APNEA): ICD-10-CM

## 2021-04-06 DIAGNOSIS — R79.0 ABNORMAL BLOOD LEVEL OF IRON: ICD-10-CM

## 2021-04-06 DIAGNOSIS — I48.21 PERMANENT ATRIAL FIBRILLATION (HCC): ICD-10-CM

## 2021-04-06 PROCEDURE — 85025 COMPLETE CBC W/AUTO DIFF WBC: CPT

## 2021-04-06 PROCEDURE — 87186 SC STD MICRODIL/AGAR DIL: CPT

## 2021-04-06 PROCEDURE — 80061 LIPID PANEL: CPT

## 2021-04-06 PROCEDURE — 83735 ASSAY OF MAGNESIUM: CPT

## 2021-04-06 PROCEDURE — 81001 URINALYSIS AUTO W/SCOPE: CPT

## 2021-04-06 PROCEDURE — 87086 URINE CULTURE/COLONY COUNT: CPT

## 2021-04-06 PROCEDURE — 82728 ASSAY OF FERRITIN: CPT

## 2021-04-06 PROCEDURE — 84439 ASSAY OF FREE THYROXINE: CPT

## 2021-04-06 PROCEDURE — 82607 VITAMIN B-12: CPT

## 2021-04-06 PROCEDURE — 83550 IRON BINDING TEST: CPT

## 2021-04-06 PROCEDURE — 36415 COLL VENOUS BLD VENIPUNCTURE: CPT

## 2021-04-06 PROCEDURE — 84443 ASSAY THYROID STIM HORMONE: CPT

## 2021-04-06 PROCEDURE — 87077 CULTURE AEROBIC IDENTIFY: CPT

## 2021-04-06 PROCEDURE — 80053 COMPREHEN METABOLIC PANEL: CPT

## 2021-04-06 PROCEDURE — 83540 ASSAY OF IRON: CPT

## 2021-04-06 PROCEDURE — 82306 VITAMIN D 25 HYDROXY: CPT

## 2021-04-07 ENCOUNTER — TELEPHONE (OUTPATIENT)
Dept: FAMILY MEDICINE CLINIC | Facility: CLINIC | Age: 81
End: 2021-04-07

## 2021-04-09 ENCOUNTER — TELEPHONE (OUTPATIENT)
Dept: FAMILY MEDICINE CLINIC | Facility: CLINIC | Age: 81
End: 2021-04-09

## 2021-04-09 NOTE — TELEPHONE ENCOUNTER
----- Message from Pablo Johnston MD sent at 4/9/2021  7:58 AM CDT -----  Lipitor results reviewed  . CBC with slight elevation of RDW nonspecific and otherwise unchanged.   Vitamin D level normal   magnesium level normal c  hemistry profile shows gluco

## 2021-04-09 NOTE — TELEPHONE ENCOUNTER
Reviewed all recommendations as per CR and KM. Pt agreed to  stool kit. Copy of lab report included for  as pt does not check her my chart. Pt states she had some minor urinary s/s that have now resolved after drinking cranberry juice.   Pt

## 2021-04-09 NOTE — TELEPHONE ENCOUNTER
----- Message from Garcia Hong MD sent at 4/7/2021  6:51 AM CDT -----  Vitamin D is better,   B12 is good. Continue present managment     Iron stores are low. [employer]ital[employer]. net  (0528 access number) for vitamins  Iron plus c twice a day x 3  Months
DaWanda message sent by Dr. Theresa James.
Patient notified of these results and verbalizes understanding.
96.4

## 2021-04-14 ENCOUNTER — TELEPHONE (OUTPATIENT)
Dept: FAMILY MEDICINE CLINIC | Facility: CLINIC | Age: 81
End: 2021-04-14

## 2021-04-14 RX ORDER — LISINOPRIL 5 MG/1
5 TABLET ORAL DAILY
Qty: 90 TABLET | Refills: 2 | Status: SHIPPED | OUTPATIENT
Start: 2021-04-14 | End: 2021-08-30

## 2021-04-14 NOTE — TELEPHONE ENCOUNTER
Future appt: Your appointments     Date & Time Appointment Department [facility])    Apr 15, 2021  9:30 AM CDT Sleep Follow Up with Sharon Mcrae MD [address] [place] [address])        Apr 19, 2021 10:15 AM CDT Anticoagulation Visit with [facility] [address])        Jun 08, 2021 10:30 AM CDT Follow Up Visit with Estephania Tomlinson MD [address] [place] [address])            [address] [place] [address] [place]  Amari Mckeon [address] [id number] [phone]        Last Appointment with provider:   3/23/2021  Last appointment at [facility] [place]:  3/23/2021  Last px: 9/16/2020     LISINOPRIL 5 MG Oral Tab #90 with 0 refills, pt to take 1 tab po daily, last refilled on 1/15/2021    Cholesterol, Total (mg/dL)   Date Value   04/06/2021 123     HDL Cholesterol (mg/dL)   Date Value   04/06/2021 34 (L)     LDL Cholesterol (mg/dL)   Date Value   04/06/2021 72     Triglycerides (mg/dL)   Date Value   04/06/2021 86     No results found for: EAG, A1C  Lab Results   Component Value Date    T4F 1.1 04/06/2021    TSH 3.050 04/06/2021       No follow-ups on file.

## 2021-04-15 ENCOUNTER — OFFICE VISIT (OUTPATIENT)
Dept: FAMILY MEDICINE CLINIC | Facility: CLINIC | Age: 81
End: 2021-04-15
Payer: MEDICARE

## 2021-04-15 VITALS
HEIGHT: 58.5 IN | BODY MASS INDEX: 41.84 KG/M2 | DIASTOLIC BLOOD PRESSURE: 80 MMHG | RESPIRATION RATE: 16 BRPM | HEART RATE: 76 BPM | OXYGEN SATURATION: 98 % | WEIGHT: 204.81 LBS | TEMPERATURE: 97 F | SYSTOLIC BLOOD PRESSURE: 122 MMHG

## 2021-04-15 DIAGNOSIS — G47.9 SLEEP DISTURBANCE: ICD-10-CM

## 2021-04-15 DIAGNOSIS — E66.01 CLASS 3 SEVERE OBESITY DUE TO EXCESS CALORIES WITH SERIOUS COMORBIDITY AND BODY MASS INDEX (BMI) OF 40.0 TO 44.9 IN ADULT (HCC): ICD-10-CM

## 2021-04-15 DIAGNOSIS — R25.2 LEG CRAMPS: ICD-10-CM

## 2021-04-15 DIAGNOSIS — J44.9 CHRONIC OBSTRUCTIVE PULMONARY DISEASE, UNSPECIFIED COPD TYPE (HCC): ICD-10-CM

## 2021-04-15 DIAGNOSIS — G47.33 OSA (OBSTRUCTIVE SLEEP APNEA): Primary | ICD-10-CM

## 2021-04-15 DIAGNOSIS — R79.0 ABNORMAL RESULT OF IRON PROFILE TESTING: ICD-10-CM

## 2021-04-15 DIAGNOSIS — G47.61 PERIODIC LIMB MOVEMENT DISORDER: ICD-10-CM

## 2021-04-15 PROCEDURE — 99214 OFFICE O/P EST MOD 30 MIN: CPT | Performed by: FAMILY MEDICINE

## 2021-04-15 RX ORDER — FLUTICASONE FUROATE, UMECLIDINIUM BROMIDE AND VILANTEROL TRIFENATATE 200; 62.5; 25 UG/1; UG/1; UG/1
1 POWDER RESPIRATORY (INHALATION) DAILY
Qty: 1 EACH | Refills: 3 | Status: SHIPPED | OUTPATIENT
Start: 2021-04-15 | End: 2021-04-15

## 2021-04-15 RX ORDER — FLUTICASONE FUROATE, UMECLIDINIUM BROMIDE AND VILANTEROL TRIFENATATE 200; 62.5; 25 UG/1; UG/1; UG/1
1 POWDER RESPIRATORY (INHALATION) DAILY
Qty: 1 EACH | Refills: 3 | Status: SHIPPED | OUTPATIENT
Start: 2021-04-15 | End: 2021-08-30 | Stop reason: ALTCHOICE

## 2021-04-15 NOTE — PROGRESS NOTES
[facility]        HPI:   This is a 79year old female coming in for Patient presents with:  Obstructive Sleep Apnea (NUVIA): F/U      HPI: She is doing ok .   She is not using her nebulizer twice a day as she was not se -   AHI  (/hr) - -   RDI (/hr) - -   PLM INDEX (/hr) - -   SS Facility - -   Sleep EFFC (%) - -   Sleep REM (%) - -   TOT Sleep TM (min) - -       Lab Results   Component Value Date    IRON 60 04/06/2021    TRANSFERRIN 283 04/06/2021    TIBCP 422 04/06/202 fluticasone-Umeclidin-Vilant (TRELEGY ELLIPTA) 700-38.1-61 MCG/INH Inhalation Aerosol Powder, Breath Activated Inhale 1 puff into the lungs daily. 1 each 3   • lisinopril 5 MG Oral Tab Take 1 tablet (5 mg total) by mouth daily.  90 tablet 2   • furosemide 2 Osteoarthrosis     Periodic limb movement disorder     Rectocele     Other psoriasis     Osteopenia of spine     Class 3 severe obesity due to excess calories with serious comorbidity and body mass index (BMI) of 40.0 to 44.9 in adult [facility])     Atheroscler 122/80  03/23/21 : 114/58  03/02/21 : 102/78    Patient must be at least 60 in tall to calculate ideal body weight    Vital signs reviewed. Physical Exam  Constitutional:       General: She is not in acute distress. Appearance: She is well-developed. symptoms and AHI. Pt will follow up at [facility] for a completely new mask as was unable to be fitted today.      2. Chronic obstructive pulmonary disease, unspecified COPD type (HCC)  - OP REFERRAL TO BI-LEVEL TITRATION STUDY  - GENERAL SLEEP STUDY [facility] above.  Continue with Rx management of Sleep apnea with Pap therapy.         Meds & Refills for this Visit:  Requested Prescriptions     Signed Prescriptions Disp Refills   • fluticasone-Umeclidin-Vilant (Sandra Catherine) 926-44.1-99 MCG/INH Inhalation Aero

## 2021-04-15 NOTE — PATIENT INSTRUCTIONS
Increase steps by 1000 steps weekly until averaging 10 K steps per day. Start trelogy inhaler. Call if too expensive. Labs in July.      Advised patient to change filters,masks,hoses  and tubes and equipment on a  regular schedule:     Filters samanta

## 2021-04-19 ENCOUNTER — ANTI-COAG VISIT (OUTPATIENT)
Dept: FAMILY MEDICINE CLINIC | Facility: CLINIC | Age: 81
End: 2021-04-19
Payer: MEDICARE

## 2021-04-19 DIAGNOSIS — Z79.01 LONG TERM (CURRENT) USE OF ANTICOAGULANTS: ICD-10-CM

## 2021-04-19 DIAGNOSIS — I48.21 PERMANENT ATRIAL FIBRILLATION (HCC): ICD-10-CM

## 2021-04-19 PROCEDURE — 93793 ANTICOAG MGMT PT WARFARIN: CPT | Performed by: FAMILY MEDICINE

## 2021-04-19 PROCEDURE — 85610 PROTHROMBIN TIME: CPT | Performed by: FAMILY MEDICINE

## 2021-04-19 NOTE — PROGRESS NOTES
Here for INR check in coumadin clinic in the office.     CURRENT COUMADIN DOSE:  8mg M,F and 7mg all other days of the week    CHANGES OR COMPLAINTS:  No changes    INR RESULTS TODAY:  2.2 ( in goal)    PLAN:  CPM coumadin  Next INR in one month  Appointmen

## 2021-04-28 ENCOUNTER — LAB ENCOUNTER (OUTPATIENT)
Dept: LAB | Facility: HOSPITAL | Age: 81
End: 2021-04-28
Attending: FAMILY MEDICINE
Payer: MEDICARE

## 2021-04-28 DIAGNOSIS — Z12.11 COLON CANCER SCREENING: ICD-10-CM

## 2021-04-28 PROCEDURE — 82274 ASSAY TEST FOR BLOOD FECAL: CPT

## 2021-05-04 ENCOUNTER — TELEPHONE (OUTPATIENT)
Dept: FAMILY MEDICINE CLINIC | Facility: CLINIC | Age: 81
End: 2021-05-04

## 2021-05-04 DIAGNOSIS — Z12.11 COLON CANCER SCREENING: Primary | ICD-10-CM

## 2021-05-04 NOTE — TELEPHONE ENCOUNTER
Call from [employer]. [facility] states they have received stool cards today for pt. Specimen collected on 4/20/21. Request for orders.

## 2021-05-04 NOTE — TELEPHONE ENCOUNTER
Pt informed. Pt states she just had a colonoscopy in Feb with Dr. Rigoberto Jones. Pt agreed to call Dr. Shabbir Hatfield office tomorrow. Recommendations reviewed with pt. Pt verbalized understanding. Lab report faxed to Dr. Shabbir Hatfield office.

## 2021-05-04 NOTE — TELEPHONE ENCOUNTER
----- Message from Ruth Merrill MD sent at 5/4/2021  2:13 PM CDT -----  Patient with positive fecal occult blood testing. Patient recommended to see gastroenterology or general surgeon for colonoscopy. Patient is on warfarin.   She will need to disc

## 2021-05-05 ENCOUNTER — TELEPHONE (OUTPATIENT)
Dept: FAMILY MEDICINE CLINIC | Facility: CLINIC | Age: 81
End: 2021-05-05

## 2021-05-12 NOTE — TELEPHONE ENCOUNTER
Future appt:     Your appointments     Date & Time Appointment Department [facility])    May 17, 2021 10:30 AM CDT Anticoagulation Visit with KEVIN [address], [facility]ell St[facility] ([place])        Jun 08, 2021 10

## 2021-05-13 RX ORDER — WARFARIN SODIUM 6 MG/1
TABLET ORAL
Qty: 108 TABLET | Refills: 0 | Status: SHIPPED | OUTPATIENT
Start: 2021-05-13 | End: 2021-09-15

## 2021-05-17 ENCOUNTER — ANTI-COAG VISIT (OUTPATIENT)
Dept: FAMILY MEDICINE CLINIC | Facility: CLINIC | Age: 81
End: 2021-05-17
Payer: MEDICARE

## 2021-05-17 DIAGNOSIS — Z79.01 LONG TERM (CURRENT) USE OF ANTICOAGULANTS: ICD-10-CM

## 2021-05-17 DIAGNOSIS — I48.21 PERMANENT ATRIAL FIBRILLATION (HCC): ICD-10-CM

## 2021-05-17 PROCEDURE — 85610 PROTHROMBIN TIME: CPT | Performed by: FAMILY MEDICINE

## 2021-05-17 PROCEDURE — 93793 ANTICOAG MGMT PT WARFARIN: CPT

## 2021-05-17 NOTE — PROGRESS NOTES
INR 2.4    PT 28.8    Current dose 8 mg Monday and Friday and 7 mg rest of days        Pt also states that she has some brusing and bleeding. She states that she is going to see Dr. Amadeo Aleman tomorrow for her rectal bleeding.     Has had an iron pill added abo

## 2021-05-17 NOTE — PROGRESS NOTES
I spoke to pt and informed her that we will continue with present management. She has no other questions at this time and will call back to set up the next appt for next month.

## 2021-05-24 ENCOUNTER — TELEPHONE (OUTPATIENT)
Dept: FAMILY MEDICINE CLINIC | Facility: CLINIC | Age: 81
End: 2021-05-24

## 2021-05-24 RX ORDER — FAMOTIDINE 20 MG/1
20 TABLET ORAL 2 TIMES DAILY
Qty: 60 TABLET | Refills: 3 | OUTPATIENT
Start: 2021-05-24

## 2021-05-24 NOTE — TELEPHONE ENCOUNTER
Dr. Connie Centeno stopped this medication on 4/15/2021 at her appt. Request denied with this notation.

## 2021-05-24 NOTE — TELEPHONE ENCOUNTER
[facility] calling for coumadin instructions from INR on 5/17/21 for pill packs. Anticoagulation visit reviewed.   Wilian Moore at [facility] notified to continue same coumadin dose and next INR due on 6/17/21

## 2021-06-08 ENCOUNTER — OFFICE VISIT (OUTPATIENT)
Dept: FAMILY MEDICINE CLINIC | Facility: CLINIC | Age: 81
End: 2021-06-08
Payer: MEDICARE

## 2021-06-08 VITALS
TEMPERATURE: 98 F | RESPIRATION RATE: 22 BRPM | SYSTOLIC BLOOD PRESSURE: 102 MMHG | WEIGHT: 198 LBS | HEART RATE: 59 BPM | HEIGHT: 58.5 IN | BODY MASS INDEX: 40.45 KG/M2 | DIASTOLIC BLOOD PRESSURE: 62 MMHG | OXYGEN SATURATION: 96 %

## 2021-06-08 DIAGNOSIS — J44.9 CHRONIC OBSTRUCTIVE PULMONARY DISEASE, UNSPECIFIED COPD TYPE (HCC): ICD-10-CM

## 2021-06-08 DIAGNOSIS — E53.8 LOW SERUM VITAMIN B12: ICD-10-CM

## 2021-06-08 DIAGNOSIS — E78.49 FAMILIAL MULTIPLE LIPOPROTEIN-TYPE HYPERLIPIDEMIA: Primary | ICD-10-CM

## 2021-06-08 DIAGNOSIS — E55.9 VITAMIN D DEFICIENCY: ICD-10-CM

## 2021-06-08 DIAGNOSIS — R63.4 WEIGHT LOSS: ICD-10-CM

## 2021-06-08 DIAGNOSIS — I48.21 PERMANENT ATRIAL FIBRILLATION (HCC): ICD-10-CM

## 2021-06-08 PROBLEM — M47.816 LUMBAR SPONDYLOSIS: Status: ACTIVE | Noted: 2019-01-02

## 2021-06-08 PROBLEM — R19.5 FECAL OCCULT BLOOD TEST POSITIVE: Status: ACTIVE | Noted: 2021-05-19

## 2021-06-08 PROBLEM — M62.830 BACK MUSCLE SPASM: Status: ACTIVE | Noted: 2021-05-07

## 2021-06-08 PROCEDURE — 99214 OFFICE O/P EST MOD 30 MIN: CPT | Performed by: FAMILY MEDICINE

## 2021-06-08 RX ORDER — FERROUS SULFATE 324(65)MG
324 TABLET, DELAYED RELEASE (ENTERIC COATED) ORAL DAILY
COMMUNITY

## 2021-06-08 NOTE — PROGRESS NOTES
[address]  PROGRESS NOTE  Chief Complaint:   Patient presents with: Follow - Up      HPI:   This is a [de-identified]year old female coming in for medical f.u    Pt with heme pos stool 4/2021- neg egd/ colon 11/2020.  Pt saw Dr. Bueno Linker- rec monit Social History:  Social History    Socioeconomic History      Marital status:       Spouse name: Not on file      Number of children: Not on file      Years of education: Not on file      Highest education level: Not on file    Occupational Hi 1 tablet by mouth all other days of the week 180 tablet 1   • Alclometasone Dipropionate 0.05 % External Cream Apply 1 Application topically 2 (two) times daily. • Metoprolol Succinate ER 50 MG Oral Tablet 24 Hr every evening.    180 tablet 2   • ipratr /62 (BP Location: Left arm, Patient Position: Sitting, Cuff Size: adult)   Pulse 59   Temp 97.7 °F (36.5 °C) (Temporal)   Resp 22   Ht 4' 10.5\" (1.486 m)   Wt 198 lb (89.8 kg)   SpO2 96%   BMI 40.68 kg/m²  Estimated body mass index is 40.68 kg/m² FERRITIN; Future  - IRON AND TIBC; Future  - LIPID PANEL; Future  - MAGNESIUM; Future    3.  Chronic obstructive pulmonary disease, unspecified COPD type ([facility] [address]  Patient improved clinically doing quite well from a respiratory standpoint okay to hold off on u syndrome of bilateral lacrimal glands     Macular drusen     Basal cell carcinoma of skin of left eyelid, including canthus     Mixed conductive and sensorineural hearing loss of both ears     Other fatigue     Breast mass, left     Dyspnea     Shortness o

## 2021-06-08 NOTE — PATIENT INSTRUCTIONS
rec check labs in August-- fasting  appt with me to follow    Continue daily iron    continue nutra-system program for weight loss  Continue PT and chiropractic care    Continue present medication

## 2021-06-28 ENCOUNTER — ANTI-COAG VISIT (OUTPATIENT)
Dept: FAMILY MEDICINE CLINIC | Facility: CLINIC | Age: 81
End: 2021-06-28
Payer: MEDICARE

## 2021-06-28 DIAGNOSIS — I48.21 PERMANENT ATRIAL FIBRILLATION (HCC): ICD-10-CM

## 2021-06-28 DIAGNOSIS — Z79.01 LONG TERM (CURRENT) USE OF ANTICOAGULANTS: ICD-10-CM

## 2021-06-28 PROCEDURE — 93793 ANTICOAG MGMT PT WARFARIN: CPT | Performed by: FAMILY MEDICINE

## 2021-06-28 PROCEDURE — 85610 PROTHROMBIN TIME: CPT | Performed by: FAMILY MEDICINE

## 2021-06-28 NOTE — PROGRESS NOTES
Here for INR check in coumadin clinic in the office.     CURRENT COUMADIN DOSE:   8mg M,F and 7mg all other days of the week     CHANGES OR COMPLAINTS:  No changes    INR RESULTS TODAY:  1.9     PLAN:  CPM coumadin  Next INR due in one month  Appointment sc

## 2021-07-26 ENCOUNTER — ANTI-COAG VISIT (OUTPATIENT)
Dept: FAMILY MEDICINE CLINIC | Facility: CLINIC | Age: 81
End: 2021-07-26
Payer: MEDICARE

## 2021-07-26 DIAGNOSIS — Z79.01 LONG TERM (CURRENT) USE OF ANTICOAGULANTS: ICD-10-CM

## 2021-07-26 DIAGNOSIS — I48.21 PERMANENT ATRIAL FIBRILLATION (HCC): ICD-10-CM

## 2021-07-26 LAB — INR: 1.9 (ref 0.8–1.2)

## 2021-07-26 PROCEDURE — 85610 PROTHROMBIN TIME: CPT | Performed by: FAMILY MEDICINE

## 2021-07-26 PROCEDURE — 93793 ANTICOAG MGMT PT WARFARIN: CPT | Performed by: FAMILY MEDICINE

## 2021-08-05 ENCOUNTER — LAB ENCOUNTER (OUTPATIENT)
Dept: LAB | Age: 81
End: 2021-08-05
Attending: FAMILY MEDICINE
Payer: MEDICARE

## 2021-08-05 DIAGNOSIS — I48.21 PERMANENT ATRIAL FIBRILLATION (HCC): ICD-10-CM

## 2021-08-05 DIAGNOSIS — E53.8 LOW SERUM VITAMIN B12: ICD-10-CM

## 2021-08-05 DIAGNOSIS — E78.49 FAMILIAL MULTIPLE LIPOPROTEIN-TYPE HYPERLIPIDEMIA: ICD-10-CM

## 2021-08-05 DIAGNOSIS — J44.9 CHRONIC OBSTRUCTIVE PULMONARY DISEASE, UNSPECIFIED COPD TYPE (HCC): ICD-10-CM

## 2021-08-05 DIAGNOSIS — G47.61 PERIODIC LIMB MOVEMENT DISORDER: ICD-10-CM

## 2021-08-05 DIAGNOSIS — R25.2 LEG CRAMPS: ICD-10-CM

## 2021-08-05 DIAGNOSIS — R79.0 ABNORMAL RESULT OF IRON PROFILE TESTING: ICD-10-CM

## 2021-08-05 DIAGNOSIS — E55.9 VITAMIN D DEFICIENCY: ICD-10-CM

## 2021-08-05 LAB
ALBUMIN SERPL-MCNC: 3.6 G/DL (ref 3.4–5)
ALBUMIN/GLOB SERPL: 0.9 {RATIO} (ref 1–2)
ALP LIVER SERPL-CCNC: 93 U/L
ALT SERPL-CCNC: 27 U/L
ANION GAP SERPL CALC-SCNC: 4 MMOL/L (ref 0–18)
AST SERPL-CCNC: 24 U/L (ref 15–37)
BASOPHILS # BLD AUTO: 0.06 X10(3) UL (ref 0–0.2)
BASOPHILS NFR BLD AUTO: 0.8 %
BILIRUB SERPL-MCNC: 0.8 MG/DL (ref 0.1–2)
BUN BLD-MCNC: 23 MG/DL (ref 7–18)
CALCIUM BLD-MCNC: 9.3 MG/DL (ref 8.5–10.1)
CHLORIDE SERPL-SCNC: 103 MMOL/L (ref 98–112)
CHOLEST SMN-MCNC: 125 MG/DL (ref ?–200)
CO2 SERPL-SCNC: 29 MMOL/L (ref 21–32)
CREAT BLD-MCNC: 0.89 MG/DL
DEPRECATED HBV CORE AB SER IA-ACNC: 106.8 NG/ML
EOSINOPHIL # BLD AUTO: 0.36 X10(3) UL (ref 0–0.7)
EOSINOPHIL NFR BLD AUTO: 4.7 %
ERYTHROCYTE [DISTWIDTH] IN BLOOD BY AUTOMATED COUNT: 12.5 %
GLOBULIN PLAS-MCNC: 4 G/DL (ref 2.8–4.4)
GLUCOSE BLD-MCNC: 97 MG/DL (ref 70–99)
HAV IGM SER QL: 2.3 MG/DL (ref 1.6–2.6)
HCT VFR BLD AUTO: 39.9 %
HDLC SERPL-MCNC: 34 MG/DL (ref 40–59)
HGB BLD-MCNC: 12.9 G/DL
IMM GRANULOCYTES # BLD AUTO: 0.02 X10(3) UL (ref 0–1)
IMM GRANULOCYTES NFR BLD: 0.3 %
IRON SATURATION: 15 %
IRON SERPL-MCNC: 64 UG/DL
LDLC SERPL CALC-MCNC: 78 MG/DL (ref ?–100)
LYMPHOCYTES # BLD AUTO: 1.26 X10(3) UL (ref 1–4)
LYMPHOCYTES NFR BLD AUTO: 16.5 %
M PROTEIN MFR SERPL ELPH: 7.6 G/DL (ref 6.4–8.2)
MCH RBC QN AUTO: 29.9 PG (ref 26–34)
MCHC RBC AUTO-ENTMCNC: 32.3 G/DL (ref 31–37)
MCV RBC AUTO: 92.6 FL
MONOCYTES # BLD AUTO: 0.7 X10(3) UL (ref 0.1–1)
MONOCYTES NFR BLD AUTO: 9.2 %
NEUTROPHILS # BLD AUTO: 5.23 X10 (3) UL (ref 1.5–7.7)
NEUTROPHILS # BLD AUTO: 5.23 X10(3) UL (ref 1.5–7.7)
NEUTROPHILS NFR BLD AUTO: 68.5 %
NONHDLC SERPL-MCNC: 91 MG/DL (ref ?–130)
OSMOLALITY SERPL CALC.SUM OF ELEC: 286 MOSM/KG (ref 275–295)
PATIENT FASTING Y/N/NP: YES
PATIENT FASTING Y/N/NP: YES
PLATELET # BLD AUTO: 259 10(3)UL (ref 150–450)
POTASSIUM SERPL-SCNC: 4.1 MMOL/L (ref 3.5–5.1)
RBC # BLD AUTO: 4.31 X10(6)UL
SODIUM SERPL-SCNC: 136 MMOL/L (ref 136–145)
TOTAL IRON BINDING CAPACITY: 419 UG/DL (ref 240–450)
TRANSFERRIN SERPL-MCNC: 281 MG/DL (ref 200–360)
TRIGL SERPL-MCNC: 59 MG/DL (ref 30–149)
VIT B12 SERPL-MCNC: 1496 PG/ML (ref 193–986)
VIT D+METAB SERPL-MCNC: 50.4 NG/ML (ref 30–100)
VLDLC SERPL CALC-MCNC: 9 MG/DL (ref 0–30)
WBC # BLD AUTO: 7.6 X10(3) UL (ref 4–11)

## 2021-08-05 PROCEDURE — 83735 ASSAY OF MAGNESIUM: CPT

## 2021-08-05 PROCEDURE — 82306 VITAMIN D 25 HYDROXY: CPT

## 2021-08-05 PROCEDURE — 85025 COMPLETE CBC W/AUTO DIFF WBC: CPT

## 2021-08-05 PROCEDURE — 83550 IRON BINDING TEST: CPT

## 2021-08-05 PROCEDURE — 80061 LIPID PANEL: CPT

## 2021-08-05 PROCEDURE — 82607 VITAMIN B-12: CPT

## 2021-08-05 PROCEDURE — 36415 COLL VENOUS BLD VENIPUNCTURE: CPT

## 2021-08-05 PROCEDURE — 80053 COMPREHEN METABOLIC PANEL: CPT

## 2021-08-05 PROCEDURE — 83540 ASSAY OF IRON: CPT

## 2021-08-05 PROCEDURE — 82728 ASSAY OF FERRITIN: CPT

## 2021-08-06 ENCOUNTER — TELEPHONE (OUTPATIENT)
Dept: FAMILY MEDICINE CLINIC | Facility: CLINIC | Age: 81
End: 2021-08-06

## 2021-08-06 NOTE — TELEPHONE ENCOUNTER
----- Message from HAMILTON Palacio sent at 8/6/2021 12:42 PM CDT -----  Please make sure patient receives measure message as below  Dr. Jluis Vargas is out of the office-I reviewed your blood work-your complete blood count is normal, your metabolic

## 2021-08-09 ENCOUNTER — ANTI-COAG VISIT (OUTPATIENT)
Dept: FAMILY MEDICINE CLINIC | Facility: CLINIC | Age: 81
End: 2021-08-09
Payer: MEDICARE

## 2021-08-09 DIAGNOSIS — I48.21 PERMANENT ATRIAL FIBRILLATION (HCC): ICD-10-CM

## 2021-08-09 DIAGNOSIS — Z79.01 LONG TERM (CURRENT) USE OF ANTICOAGULANTS: ICD-10-CM

## 2021-08-09 LAB — INR: 1.8 (ref 0.8–1.2)

## 2021-08-09 PROCEDURE — 85610 PROTHROMBIN TIME: CPT | Performed by: FAMILY MEDICINE

## 2021-08-09 PROCEDURE — 93793 ANTICOAG MGMT PT WARFARIN: CPT | Performed by: FAMILY MEDICINE

## 2021-08-09 NOTE — PROGRESS NOTES
Here for INR check in coumadin clinic in the office.     CURRENT COUMADIN DOSE:  8mg M,W,F and 7mg all other days of the week    CHANGES OR COMPLAINTS:  No changes    INR RESULTS TODAY:  1.7 ( below goal)    PLAN:  Increase coumadin to 7mg Tu, Th, Sat and 8

## 2021-08-11 ENCOUNTER — TELEPHONE (OUTPATIENT)
Dept: FAMILY MEDICINE CLINIC | Facility: CLINIC | Age: 81
End: 2021-08-11

## 2021-08-11 NOTE — TELEPHONE ENCOUNTER
Patient's current coumadin dose is 7mg Tu, Th, Sat and 8mg all other days. Yesterday was Tuesday and she was to take a total of 7mg, but she took 8mg instead. Patient advised tonight on Wednesday to take 7mg and then resume current dose.  Patient verbalizes

## 2021-08-19 ENCOUNTER — ANTI-COAG VISIT (OUTPATIENT)
Dept: FAMILY MEDICINE CLINIC | Facility: CLINIC | Age: 81
End: 2021-08-19
Payer: MEDICARE

## 2021-08-19 DIAGNOSIS — Z79.01 LONG TERM (CURRENT) USE OF ANTICOAGULANTS: ICD-10-CM

## 2021-08-19 DIAGNOSIS — I48.21 PERMANENT ATRIAL FIBRILLATION (HCC): ICD-10-CM

## 2021-08-19 LAB — INR: 1.7 (ref 0.8–1.2)

## 2021-08-19 PROCEDURE — 85610 PROTHROMBIN TIME: CPT | Performed by: FAMILY MEDICINE

## 2021-08-19 PROCEDURE — 93793 ANTICOAG MGMT PT WARFARIN: CPT | Performed by: FAMILY MEDICINE

## 2021-08-19 NOTE — PROGRESS NOTES
Here for INR check in coumadin clinic in the office.     CURRENT COUMADIN DOSE:  8mg Tu, Th, Sa and 7mg all other days of the week    CHANGES OR COMPLAINTS:  Ate more blueberries    INR RESULTS TODAY:  1.7 ( in goal)    PLAN:  Increase coumadin to 8mg daily

## 2021-08-23 ENCOUNTER — ANTI-COAG VISIT (OUTPATIENT)
Dept: FAMILY MEDICINE CLINIC | Facility: CLINIC | Age: 81
End: 2021-08-23
Payer: MEDICARE

## 2021-08-23 DIAGNOSIS — Z79.01 LONG TERM (CURRENT) USE OF ANTICOAGULANTS: ICD-10-CM

## 2021-08-23 DIAGNOSIS — I48.21 PERMANENT ATRIAL FIBRILLATION (HCC): ICD-10-CM

## 2021-08-23 LAB — INR: 1.7 (ref 0.8–1.2)

## 2021-08-23 PROCEDURE — 85610 PROTHROMBIN TIME: CPT | Performed by: FAMILY MEDICINE

## 2021-08-23 PROCEDURE — 93793 ANTICOAG MGMT PT WARFARIN: CPT | Performed by: FAMILY MEDICINE

## 2021-08-23 NOTE — PROGRESS NOTES
Here for INR check in coumadin clinic in the office.     CURRENT COUMADIN DOSE:  8mg daily ( normal dose is 8mg Tue,Th, Sa and 7mg all other days of the week)    CHANGES OR COMPLAINTS:  Started nurtrisystem diet 2 weeks ago   having eyelid surgery fo

## 2021-08-27 ENCOUNTER — ANTI-COAG VISIT (OUTPATIENT)
Dept: FAMILY MEDICINE CLINIC | Facility: CLINIC | Age: 81
End: 2021-08-27
Payer: MEDICARE

## 2021-08-27 ENCOUNTER — TELEPHONE (OUTPATIENT)
Dept: FAMILY MEDICINE CLINIC | Facility: CLINIC | Age: 81
End: 2021-08-27

## 2021-08-27 DIAGNOSIS — I48.21 PERMANENT ATRIAL FIBRILLATION (HCC): ICD-10-CM

## 2021-08-27 DIAGNOSIS — Z79.01 LONG TERM (CURRENT) USE OF ANTICOAGULANTS: ICD-10-CM

## 2021-08-27 LAB — INR: 2.3 (ref 0.8–1.2)

## 2021-08-27 PROCEDURE — 93793 ANTICOAG MGMT PT WARFARIN: CPT | Performed by: FAMILY MEDICINE

## 2021-08-27 PROCEDURE — 85610 PROTHROMBIN TIME: CPT | Performed by: FAMILY MEDICINE

## 2021-08-27 NOTE — PROGRESS NOTES
Discussed with CR. Pt to return back to usual Coumadin dose:  8mg Tues, Thurs, Sat and 7 mg rest of days.

## 2021-08-27 NOTE — PROGRESS NOTES
Pt INR on 8/23/21 was 1.7. Pt states she took 10mg on Monday and 8mg Tues, Wed, Thursday. Pt states she feels she may have been low due to eating some blueberries. Pt INR today: 2.3. No complaints or changes reported.     Routed to PCP for review

## 2021-08-27 NOTE — PROGRESS NOTES
Pt informed. Pt scheduled recheck in one week. Pt gets pill packs-  Magalys Neff was called and informed.   Future Appointments   Date Time Provider Cora Palafox   8/30/2021 11:00 AM Nicky Bolton MD [facility] [place] [facility]   9/3/2021  9:15 AM

## 2021-08-30 ENCOUNTER — OFFICE VISIT (OUTPATIENT)
Dept: FAMILY MEDICINE CLINIC | Facility: CLINIC | Age: 81
End: 2021-08-30
Payer: MEDICARE

## 2021-08-30 ENCOUNTER — TELEPHONE (OUTPATIENT)
Dept: FAMILY MEDICINE CLINIC | Facility: CLINIC | Age: 81
End: 2021-08-30

## 2021-08-30 VITALS
DIASTOLIC BLOOD PRESSURE: 80 MMHG | TEMPERATURE: 99 F | BODY MASS INDEX: 40.25 KG/M2 | SYSTOLIC BLOOD PRESSURE: 124 MMHG | HEIGHT: 58.5 IN | RESPIRATION RATE: 20 BRPM | HEART RATE: 91 BPM | WEIGHT: 197 LBS | OXYGEN SATURATION: 96 %

## 2021-08-30 DIAGNOSIS — E78.49 FAMILIAL MULTIPLE LIPOPROTEIN-TYPE HYPERLIPIDEMIA: ICD-10-CM

## 2021-08-30 DIAGNOSIS — I48.21 PERMANENT ATRIAL FIBRILLATION (HCC): ICD-10-CM

## 2021-08-30 DIAGNOSIS — I25.10 ATHEROSCLEROSIS OF NATIVE CORONARY ARTERY OF NATIVE HEART WITHOUT ANGINA PECTORIS: ICD-10-CM

## 2021-08-30 DIAGNOSIS — I10 ESSENTIAL HYPERTENSION, BENIGN: Primary | ICD-10-CM

## 2021-08-30 DIAGNOSIS — I27.20 PULMONARY HYPERTENSION (HCC): ICD-10-CM

## 2021-08-30 DIAGNOSIS — F41.9 ANXIETY: ICD-10-CM

## 2021-08-30 PROCEDURE — 99214 OFFICE O/P EST MOD 30 MIN: CPT | Performed by: FAMILY MEDICINE

## 2021-08-30 RX ORDER — CITALOPRAM 10 MG/1
10 TABLET ORAL DAILY
Qty: 90 TABLET | Refills: 0 | Status: SHIPPED | OUTPATIENT
Start: 2021-08-30 | End: 2021-11-18

## 2021-08-30 NOTE — PROGRESS NOTES
[address]  PROGRESS NOTE  Chief Complaint:   Patient presents with: Follow - Up      HPI:   This is a [de-identified]year old female coming in for  medicl f.u  Pt on lisinopril- told can stop medication     Pt noted more anxiety, not depressed.  Fe Quit date: 3/15/1971        Years since quittin.4      Smokeless tobacco: Never Used    Vaping Use      Vaping Use: Never used    Substance and Sexual Activity      Alcohol use:  Yes        Alcohol/week: 0.0 standard drinks        Comment: rare- LOVASTATIN 20 MG Oral Tab TAKE ONE TABLET BY MOUTH EVERY DAY 90 tablet 1      Counseling given: Not Answered       REVIEW OF SYSTEMS:   CONSTITUTIONAL:  Denies unusual weight gain/loss, fever, chills, or fatigue.   EENT:  Eyes:  Denies eye pain, visual loss PERRLA, no scleral icterus, conjunctivae clear bilaterally, no eye discharge Ears: External normal. Nose: patent, no nasal discharge Throat:  No tonsillar erythema or exudate. Mouth:  No oral lesions or ulcerations, good dentition.   NECK: Supple, no thyro limb movement disorder     Rectocele     Other psoriasis     Osteopenia of spine     Class 3 severe obesity due to excess calories with serious comorbidity and body mass index (BMI) of 40.0 to 44.9 in adult [facility])     Atherosclerosis of native coronary padmini done.   Outcome: Patient verbalizes understanding. Patient is notified to call with any questions, complications, allergies, or worsening or changing symptoms.   Patient is to call with any side effects or complications from the treatments as a result of to

## 2021-08-30 NOTE — TELEPHONE ENCOUNTER
----- Message from Herminia Koehler MD sent at 8/30/2021 11:18 AM CDT -----  Call Mayda Alcala-- start celexa    Stop lisinorpil    Has pill packs from ahmet Daley

## 2021-08-30 NOTE — PATIENT INSTRUCTIONS
[id number] [address] Garcia for iron table every other day    rec decrease B12 suppliment    Continue present medications    Add celexa for anxiety    Recheck October- medicare annual    Encourage walking/ chair exercises      Future Appointments   Date Time Provider Department Ce

## 2021-09-03 ENCOUNTER — ANTI-COAG VISIT (OUTPATIENT)
Dept: FAMILY MEDICINE CLINIC | Facility: CLINIC | Age: 81
End: 2021-09-03
Payer: MEDICARE

## 2021-09-03 DIAGNOSIS — Z79.01 LONG TERM (CURRENT) USE OF ANTICOAGULANTS: ICD-10-CM

## 2021-09-03 DIAGNOSIS — I48.21 PERMANENT ATRIAL FIBRILLATION (HCC): ICD-10-CM

## 2021-09-03 LAB — INR: 1.7 (ref 0.8–1.2)

## 2021-09-03 PROCEDURE — 85610 PROTHROMBIN TIME: CPT | Performed by: FAMILY MEDICINE

## 2021-09-03 PROCEDURE — 93793 ANTICOAG MGMT PT WARFARIN: CPT | Performed by: FAMILY MEDICINE

## 2021-09-03 NOTE — PROGRESS NOTES
INR: 1.7  Dose verified with Aly (pt has pill packs set up)  Pt Coumadin dose: 8mg Tu,Th, Sa and 7mg all other days of the week    No c/o reported by pt    Routed to PCP

## 2021-09-07 ENCOUNTER — TELEPHONE (OUTPATIENT)
Dept: FAMILY MEDICINE CLINIC | Facility: CLINIC | Age: 81
End: 2021-09-07

## 2021-09-07 NOTE — TELEPHONE ENCOUNTER
Needs to reschedule INR to this Thursday instead of Friday. Appt scheduled.      Future Appointments   Date Time Provider Cora Vivienne   9/9/2021  1:45 PM [facility] [place] NURSE [facility] [place] [facility] [place]   10/22/2021 10:00 AM Serena Forbes MD [facility]

## 2021-09-09 ENCOUNTER — ANTI-COAG VISIT (OUTPATIENT)
Dept: FAMILY MEDICINE CLINIC | Facility: CLINIC | Age: 81
End: 2021-09-09
Payer: MEDICARE

## 2021-09-09 DIAGNOSIS — I48.21 PERMANENT ATRIAL FIBRILLATION (HCC): ICD-10-CM

## 2021-09-09 DIAGNOSIS — Z79.01 LONG TERM (CURRENT) USE OF ANTICOAGULANTS: ICD-10-CM

## 2021-09-09 LAB — INR: 1.9 (ref 0.8–1.2)

## 2021-09-09 PROCEDURE — 85610 PROTHROMBIN TIME: CPT | Performed by: FAMILY MEDICINE

## 2021-09-09 PROCEDURE — 93793 ANTICOAG MGMT PT WARFARIN: CPT | Performed by: FAMILY MEDICINE

## 2021-09-09 NOTE — PROGRESS NOTES
Here for INR check in coumadin clinic in the office.     CURRENT COUMADIN DOSE:  10mg Th and 9mg all other days of the week     CHANGES OR COMPLAINTS:  Continues nutrisystem diet    INR RESULTS TODAY:  1.9 ( in goal)     PLAN:  Coumadin 8mg daily  Next INR

## 2021-09-15 ENCOUNTER — ANTI-COAG VISIT (OUTPATIENT)
Dept: FAMILY MEDICINE CLINIC | Facility: CLINIC | Age: 81
End: 2021-09-15
Payer: MEDICARE

## 2021-09-15 DIAGNOSIS — Z79.01 LONG TERM (CURRENT) USE OF ANTICOAGULANTS: ICD-10-CM

## 2021-09-15 DIAGNOSIS — I48.21 PERMANENT ATRIAL FIBRILLATION (HCC): ICD-10-CM

## 2021-09-15 LAB — INR: 2.8 (ref 0.8–1.2)

## 2021-09-15 PROCEDURE — 93793 ANTICOAG MGMT PT WARFARIN: CPT | Performed by: FAMILY MEDICINE

## 2021-09-15 PROCEDURE — 85610 PROTHROMBIN TIME: CPT | Performed by: FAMILY MEDICINE

## 2021-09-15 NOTE — PROGRESS NOTES
Here for INR check in coumadin clinic in the office.     CURRENT COUMADIN DOSE:  8mg daily    CHANGES OR COMPLAINTS:  Starting celexa    INR RESULTS TODAY:  2.8 ( in goal)    PLAN:  CPM coumadin 8mg daily  Next INR due in one week  Written coumadin dosing i

## 2021-09-16 RX ORDER — WARFARIN SODIUM 6 MG/1
TABLET ORAL
Qty: 108 TABLET | Refills: 0 | Status: SHIPPED | OUTPATIENT
Start: 2021-09-16 | End: 2022-01-06

## 2021-09-16 NOTE — TELEPHONE ENCOUNTER
Future appt:     Your appointments     Date & Time Appointment Department [facility])    Sep 22, 2021 10:30 AM CDT Anticoagulation Visit with KEVIN [address], [facility] ([facility])        Oct 22, 2021 10

## 2021-09-22 ENCOUNTER — ANTI-COAG VISIT (OUTPATIENT)
Dept: FAMILY MEDICINE CLINIC | Facility: CLINIC | Age: 81
End: 2021-09-22
Payer: MEDICARE

## 2021-09-22 DIAGNOSIS — I48.21 PERMANENT ATRIAL FIBRILLATION (HCC): ICD-10-CM

## 2021-09-22 DIAGNOSIS — Z79.01 LONG TERM (CURRENT) USE OF ANTICOAGULANTS: ICD-10-CM

## 2021-09-22 LAB — INR: 3 (ref 0.8–1.2)

## 2021-09-22 PROCEDURE — 93793 ANTICOAG MGMT PT WARFARIN: CPT | Performed by: FAMILY MEDICINE

## 2021-09-22 PROCEDURE — 85610 PROTHROMBIN TIME: CPT | Performed by: FAMILY MEDICINE

## 2021-09-23 NOTE — PROGRESS NOTES
Here for INR check in coumadin clinic in the office. CURRENT COUMADIN DOSE:  8mg daily    CHANGES OR COMPLAINTS:  Decrease in leafy green vegetables     INR RESULTS TODAY:  3.0 ( in goal)    PLAN:  CPM coumadin   Resume regular diet with leafy greens .

## 2021-10-14 RX ORDER — WARFARIN SODIUM 1 MG/1
TABLET ORAL
Qty: 180 TABLET | Refills: 1 | Status: SHIPPED | OUTPATIENT
Start: 2021-10-14

## 2021-10-14 NOTE — TELEPHONE ENCOUNTER
Future appt:     Your appointments     Date & Time Appointment Department [facility])    Oct 20, 2021 11:00 AM CDT Laboratory Visit with REF [facility] ([facility])        Dec 06, 2021 10:30 AM CST Medicare Annual Well Visit with RAFAEL

## 2021-10-20 ENCOUNTER — LABORATORY ENCOUNTER (OUTPATIENT)
Dept: LAB | Age: 81
End: 2021-10-20
Attending: FAMILY MEDICINE
Payer: MEDICARE

## 2021-10-20 DIAGNOSIS — Z79.01 LONG TERM (CURRENT) USE OF ANTICOAGULANTS: ICD-10-CM

## 2021-10-20 PROCEDURE — 36415 COLL VENOUS BLD VENIPUNCTURE: CPT

## 2021-10-20 PROCEDURE — 85610 PROTHROMBIN TIME: CPT

## 2021-10-21 ENCOUNTER — TELEPHONE (OUTPATIENT)
Dept: FAMILY MEDICINE CLINIC | Facility: CLINIC | Age: 81
End: 2021-10-21

## 2021-10-21 NOTE — TELEPHONE ENCOUNTER
I have called pt - phones off no vm    I called everyone on her verbal release    No answer from anyone did leave a vm on Earnestine oates jayda

## 2021-10-21 NOTE — TELEPHONE ENCOUNTER
pts daughter called back and I have informed her of the information. She understands and is going to inform her mother of the info.

## 2021-10-21 NOTE — TELEPHONE ENCOUNTER
----- Message from HAMILTON Koroma sent at 10/20/2021  7:09 PM CDT -----  Metro Schaumann out of office. INR in normal range at 2.46.   Continue present management, repeat INR in two weeks then consider 4 weeks if still in goal.

## 2021-11-02 ENCOUNTER — TELEPHONE (OUTPATIENT)
Dept: FAMILY MEDICINE CLINIC | Facility: CLINIC | Age: 81
End: 2021-11-02

## 2021-11-02 NOTE — TELEPHONE ENCOUNTER
Pt INR On 10/20- was in range- noted per HM  See lab report. Spoke with Analia Griggs at [facility]. Pt due for INR 11/3- scheduled with lab on 11/5. Analia Indu states pt is getting 8mg of Coumadin daily in her pill packs.   Pt is getting one- 6 mg tablet and two- 1 mg

## 2021-11-04 ENCOUNTER — TELEPHONE (OUTPATIENT)
Dept: FAMILY MEDICINE CLINIC | Facility: CLINIC | Age: 81
End: 2021-11-04

## 2021-11-04 NOTE — TELEPHONE ENCOUNTER
when confirming her appointment for tomorrow she answered yes to the following sick symptoms - cough, no fever,  phlem, please advise to keep tomorrows appointment

## 2021-11-04 NOTE — TELEPHONE ENCOUNTER
Patient has a lab appt tomorrow to have her INR checked. Patient c/o having a cold. States everything effects the way she breaths because she has \"lung issues. \"  Patient c/o cough, runny nose, \"full lungs\", and SOB.   Patient states her symptoms s

## 2021-11-08 ENCOUNTER — ANTI-COAG VISIT (OUTPATIENT)
Dept: FAMILY MEDICINE CLINIC | Facility: CLINIC | Age: 81
End: 2021-11-08

## 2021-11-08 ENCOUNTER — TELEPHONE (OUTPATIENT)
Dept: FAMILY MEDICINE CLINIC | Facility: CLINIC | Age: 81
End: 2021-11-08

## 2021-11-08 ENCOUNTER — LAB ENCOUNTER (OUTPATIENT)
Dept: LAB | Age: 81
End: 2021-11-08
Attending: FAMILY MEDICINE
Payer: MEDICARE

## 2021-11-08 DIAGNOSIS — Z79.01 LONG TERM (CURRENT) USE OF ANTICOAGULANTS: ICD-10-CM

## 2021-11-08 DIAGNOSIS — I48.21 PERMANENT ATRIAL FIBRILLATION (HCC): ICD-10-CM

## 2021-11-08 PROCEDURE — 85610 PROTHROMBIN TIME: CPT

## 2021-11-08 PROCEDURE — 36415 COLL VENOUS BLD VENIPUNCTURE: CPT

## 2021-11-08 NOTE — PROGRESS NOTES
Pt INR dated 11/8/21- 2. 19- pt in range     Pt had lab draw- see MD note attached to lab report. Pt instructions per CR- to CPM and recheck INR in 2 wks. Pt will be due for repeat on 11/22/21  LM for pt.

## 2021-11-08 NOTE — TELEPHONE ENCOUNTER
----- Message from Merlinda Deacon, MD sent at 11/8/2021  4:43 PM CST -----  INR in goal range of 2-3. Patient to continue same Coumadin dose recheck 2 weeks. Results forwarded to patient via [email or web address] system.   Patient encouraged to call for any questi

## 2021-11-09 NOTE — PROGRESS NOTES
Pt informed. Pt states she will call back to schedule appt for INR recheck in 2 wks. Tacho Rivera called with update on continued dose- pt has pill packs arranged by pharmacy.

## 2021-11-18 RX ORDER — CITALOPRAM 10 MG/1
TABLET ORAL
Qty: 90 TABLET | Refills: 0 | Status: SHIPPED | OUTPATIENT
Start: 2021-11-18

## 2021-11-18 NOTE — TELEPHONE ENCOUNTER
Future appt:     Your appointments     Date & Time Appointment Department [facility])    Nov 23, 2021 10:30 AM CST Laboratory Visit with REF [place] [facility] ([facility])        Dec 06, 2021 10:30 AM CST Medicare Annual Well Visit with RAFAEL

## 2021-11-23 ENCOUNTER — LAB ENCOUNTER (OUTPATIENT)
Dept: LAB | Age: 81
End: 2021-11-23
Attending: FAMILY MEDICINE
Payer: MEDICARE

## 2021-11-23 DIAGNOSIS — Z79.01 LONG TERM (CURRENT) USE OF ANTICOAGULANTS: ICD-10-CM

## 2021-11-23 PROCEDURE — 85610 PROTHROMBIN TIME: CPT

## 2021-11-23 PROCEDURE — 36415 COLL VENOUS BLD VENIPUNCTURE: CPT

## 2021-11-24 ENCOUNTER — ANTI-COAG VISIT (OUTPATIENT)
Dept: FAMILY MEDICINE CLINIC | Facility: CLINIC | Age: 81
End: 2021-11-24

## 2021-11-24 DIAGNOSIS — I48.21 PERMANENT ATRIAL FIBRILLATION (HCC): ICD-10-CM

## 2021-11-24 DIAGNOSIS — Z79.01 LONG TERM (CURRENT) USE OF ANTICOAGULANTS: ICD-10-CM

## 2021-11-24 NOTE — PROGRESS NOTES
Warfarin dose showing above at 8mg is correct. Please advise.   Heather Christian, MARIBEL, 11/24/21, 8:15 AM

## 2021-11-24 NOTE — PROGRESS NOTES
M/L Heather Christian, [facility], 11/24/21, 1:03 PM  VM Still on. Heather Christian, [address] [place] [address], 11/24/21, 4:14 PM    Patient informed of below. Expressed understanding.   Heather Christian, [address] [place] [address], 11/24/21, 4:17 PM

## 2021-12-06 ENCOUNTER — OFFICE VISIT (OUTPATIENT)
Dept: FAMILY MEDICINE CLINIC | Facility: CLINIC | Age: 81
End: 2021-12-06
Payer: MEDICARE

## 2021-12-06 VITALS
SYSTOLIC BLOOD PRESSURE: 94 MMHG | OXYGEN SATURATION: 95 % | DIASTOLIC BLOOD PRESSURE: 62 MMHG | RESPIRATION RATE: 18 BRPM | BODY MASS INDEX: 40.92 KG/M2 | WEIGHT: 203 LBS | TEMPERATURE: 97 F | HEIGHT: 59 IN | HEART RATE: 67 BPM

## 2021-12-06 DIAGNOSIS — M48.00 SPINAL STENOSIS, UNSPECIFIED SPINAL REGION: ICD-10-CM

## 2021-12-06 DIAGNOSIS — I27.20 PULMONARY HYPERTENSION (HCC): ICD-10-CM

## 2021-12-06 DIAGNOSIS — Z13.31 DEPRESSION SCREENING: ICD-10-CM

## 2021-12-06 DIAGNOSIS — J44.9 CHRONIC OBSTRUCTIVE PULMONARY DISEASE, UNSPECIFIED COPD TYPE (HCC): ICD-10-CM

## 2021-12-06 DIAGNOSIS — Z00.00 ENCOUNTER FOR ANNUAL HEALTH EXAMINATION: Primary | ICD-10-CM

## 2021-12-06 DIAGNOSIS — E66.01 CLASS 3 SEVERE OBESITY DUE TO EXCESS CALORIES WITH SERIOUS COMORBIDITY AND BODY MASS INDEX (BMI) OF 40.0 TO 44.9 IN ADULT (HCC): ICD-10-CM

## 2021-12-06 DIAGNOSIS — Z86.73 HX OF TRANSIENT ISCHEMIC ATTACK (TIA): ICD-10-CM

## 2021-12-06 DIAGNOSIS — G47.33 OBSTRUCTIVE SLEEP APNEA: ICD-10-CM

## 2021-12-06 DIAGNOSIS — E78.49 FAMILIAL MULTIPLE LIPOPROTEIN-TYPE HYPERLIPIDEMIA: ICD-10-CM

## 2021-12-06 DIAGNOSIS — K21.9 GASTROESOPHAGEAL REFLUX DISEASE WITHOUT ESOPHAGITIS: ICD-10-CM

## 2021-12-06 DIAGNOSIS — I25.10 ATHEROSCLEROSIS OF NATIVE CORONARY ARTERY OF NATIVE HEART WITHOUT ANGINA PECTORIS: ICD-10-CM

## 2021-12-06 DIAGNOSIS — I10 ESSENTIAL HYPERTENSION, BENIGN: ICD-10-CM

## 2021-12-06 DIAGNOSIS — H90.6 MIXED CONDUCTIVE AND SENSORINEURAL HEARING LOSS OF BOTH EARS: ICD-10-CM

## 2021-12-06 DIAGNOSIS — J45.40 MODERATE PERSISTENT ASTHMA, UNSPECIFIED WHETHER COMPLICATED: ICD-10-CM

## 2021-12-06 DIAGNOSIS — I48.21 PERMANENT ATRIAL FIBRILLATION (HCC): ICD-10-CM

## 2021-12-06 DIAGNOSIS — M15.9 PRIMARY OSTEOARTHRITIS INVOLVING MULTIPLE JOINTS: ICD-10-CM

## 2021-12-06 DIAGNOSIS — Z79.01 LONG TERM (CURRENT) USE OF ANTICOAGULANTS: ICD-10-CM

## 2021-12-06 PROBLEM — I21.4 NSTEMI (NON-ST ELEVATED MYOCARDIAL INFARCTION) (HCC): Status: RESOLVED | Noted: 2018-03-11 | Resolved: 2021-12-06

## 2021-12-06 PROCEDURE — G0439 PPPS, SUBSEQ VISIT: HCPCS | Performed by: FAMILY MEDICINE

## 2021-12-06 PROCEDURE — G0444 DEPRESSION SCREEN ANNUAL: HCPCS | Performed by: FAMILY MEDICINE

## 2021-12-06 PROCEDURE — 99213 OFFICE O/P EST LOW 20 MIN: CPT | Performed by: FAMILY MEDICINE

## 2021-12-06 NOTE — PROGRESS NOTES
CC: Annual Physical Exam    HPI:   Teresita Beckham is a [de-identified]year old female who presents for a complete physical exam.  immunizations reviewed  Seeing cardiology and EP specialist -- stable on meds    Breathing  Ok- chronic issues    Labs on August Re Cholecalciferol (VITAMIN D) 50 MCG (2000 UT) Oral Cap Take 2,000 Units by mouth daily. • aspirin 81 MG Oral Tab Take 81 mg by mouth daily.      • LOVASTATIN 20 MG Oral Tab TAKE ONE TABLET BY MOUTH EVERY DAY 90 tablet 1        Ambien [Zolpidem]       MARYCARMEN 2 - No    Has your appetite been poor?: No    Type of Diet: Balanced    How does the patient maintain a good energy level?: Other    How would you describe your daily physical activity?: Light    How would you describe your current health state?: Fair    H depressed, or hopeless: Not at all    PHQ-2 SCORE: 0             REVIEW OF SYSTEMS:   CONSTITUTIONAL:  Denies unusual weight gain/loss, fever, chills, or fatigue. EENT:  Eyes:  Denies eye pain, visual loss, blurred vision, double vision or yellow sclerae. does some deep breaths intermittently seems to be more related to wearing the mask as when she has the mask off breathing irregularity not noted.   HEENT:  Head:  Normocephalic, atraumatic   Eyes: EOMI, PERRLA, no scleral icterus, conjunctivae clear bilater medical management  - COMP METABOLIC PANEL (14); Future  - LIPID PANEL; Future  - MAGNESIUM; Future    6. Hx of transient ischemic attack (TIA)  Asymptomatic    7. Permanent atrial fibrillation (HCC)  Anticoagulated with Coumadin asymptomatic    8.  Pulmona (hcc)  Chronic obstructive pulmonary disease, unspecified copd type (hcc)  Moderate persistent asthma, unspecified whether complicated  Obstructive sleep apnea  Gastroesophageal reflux disease without esophagitis  Primary osteoarthritis involving multiple 02/20/2018      Ultrasound Screening for Abdominal Aortic Aneurysm (AAA) Covered once in a lifetime for one of the following risk factors   • Men who are 73-68 years old and have ever smoked   • Anyone with a family history -     Colorectal Cancer Screenin by Medicare Part B -  No recommendations at this time    Zoster Not covered by Medicare Part B; may be covered with your pharmacy  prescription benefits -  Zoster Vaccines(1 of 2) Never done        Annual Monitoring of Persistent Medications (ACE/ARB, digo software.  Please excuse any grammatical errors. Call my office if you have any questions regarding this note.

## 2021-12-06 NOTE — PATIENT INSTRUCTIONS
rec covid booster    rec mammogram at least a month after covid    rec flu vaccine 2-4 weeks later    rec shingle vaccine in the spring- awat from flu and covid vacine    rec fasting labs    Encourage chair and house exercise    Continue medications    cot annually 04/28/2021   Bone Density Screening    Bone density screening    Covered every 2 years after age 72 if diagnosed with risk of osteoporosis or estrogen deficiency.     Covered yearly for long-term glucocorticoid medication use (Steroids) Last Dexa S results found for: DIGOXIN, DIG, VALP               Chronic Obstructive Pulmonary Disease (COPD)    Spirometry Annually Spirometry date:      Recommended Websites for Advanced Directives    [email or web address]  An in

## 2021-12-13 ENCOUNTER — LAB ENCOUNTER (OUTPATIENT)
Dept: LAB | Age: 81
End: 2021-12-13
Attending: FAMILY MEDICINE
Payer: MEDICARE

## 2021-12-13 DIAGNOSIS — Z79.01 LONG TERM (CURRENT) USE OF ANTICOAGULANTS: ICD-10-CM

## 2021-12-13 PROCEDURE — 36415 COLL VENOUS BLD VENIPUNCTURE: CPT

## 2021-12-13 PROCEDURE — 85610 PROTHROMBIN TIME: CPT

## 2021-12-14 ENCOUNTER — ANTI-COAG VISIT (OUTPATIENT)
Dept: FAMILY MEDICINE CLINIC | Facility: CLINIC | Age: 81
End: 2021-12-14

## 2021-12-14 DIAGNOSIS — I48.21 PERMANENT ATRIAL FIBRILLATION (HCC): ICD-10-CM

## 2021-12-14 DIAGNOSIS — Z79.01 LONG TERM (CURRENT) USE OF ANTICOAGULANTS: ICD-10-CM

## 2021-12-14 NOTE — PROGRESS NOTES
INR: 1.99-  Dated 12/13/21. Pt contacted- pt doing well. Pt states she may have not had enough cabbage or blueberries as she normally does. Pt states tries to keep the same quantity every week.     Pt is doing well and is taking 8mg of Coumadin per day

## 2021-12-14 NOTE — PROGRESS NOTES
Pt informed. Pt agreed to recheck INR in one week.     Future Appointments   Date Time Provider Cora Palafox   12/21/2021 12:00 PM REF [facility]   1/3/2022  9:00 AM REF [facility]

## 2021-12-14 NOTE — PROGRESS NOTES
Please encourage consistency and patient diet. Patient INR 1.99 goal is 2-3. Patient okay to continue current Coumadin dosage. Recheck INR 1 week.

## 2021-12-16 ENCOUNTER — TELEPHONE (OUTPATIENT)
Dept: FAMILY MEDICINE CLINIC | Facility: CLINIC | Age: 81
End: 2021-12-16

## 2021-12-16 NOTE — TELEPHONE ENCOUNTER
DOC on Grant's voicemail advising this patient is currently receiving Warfarin 8mg daily and will retest INR on 12/21. To return call with any further questions.

## 2021-12-16 NOTE — TELEPHONE ENCOUNTER
current dose for Warfarin / INR    and date of her next appt   for her PillPak         Future Appointments   Date Time Provider Cora Palafox   12/21/2021 12:00 PM REF [facility]   1/3/2022  9:00 AM REF [facility]

## 2021-12-21 ENCOUNTER — LABORATORY ENCOUNTER (OUTPATIENT)
Dept: LAB | Age: 81
End: 2021-12-21
Attending: FAMILY MEDICINE
Payer: MEDICARE

## 2021-12-21 DIAGNOSIS — Z79.01 LONG TERM (CURRENT) USE OF ANTICOAGULANTS: ICD-10-CM

## 2021-12-21 PROCEDURE — 36415 COLL VENOUS BLD VENIPUNCTURE: CPT

## 2021-12-21 PROCEDURE — 85610 PROTHROMBIN TIME: CPT

## 2021-12-22 ENCOUNTER — ANTI-COAG VISIT (OUTPATIENT)
Dept: FAMILY MEDICINE CLINIC | Facility: CLINIC | Age: 81
End: 2021-12-22

## 2021-12-22 ENCOUNTER — TELEPHONE (OUTPATIENT)
Dept: FAMILY MEDICINE CLINIC | Facility: CLINIC | Age: 81
End: 2021-12-22

## 2021-12-22 DIAGNOSIS — Z79.01 LONG TERM (CURRENT) USE OF ANTICOAGULANTS: ICD-10-CM

## 2021-12-22 DIAGNOSIS — I48.21 PERMANENT ATRIAL FIBRILLATION (HCC): ICD-10-CM

## 2021-12-22 NOTE — PROGRESS NOTES
MD Jovi Guadalupe Nurse Pool  INR results reviewed.   INR 2.48 in desired range.  Patient to continue current management recheck 1 month     Results forwarded to patient via [place] [phone] encouraged to call for any ques

## 2021-12-22 NOTE — PROGRESS NOTES
Pt contacted- recommendations given. Pt doing well, no concerns reported. Pt scheduled one month f/u appt.     Future Appointments   Date Time Provider Cora Palafox   1/3/2022  9:00 AM REF [facility]   1/19/2022 12:00 PM REF [facility]

## 2021-12-22 NOTE — TELEPHONE ENCOUNTER
Please see anti-coag encounter dated 12/22/21. Pt gets pill packs from [employer]. Clyde informed that pt will continue with 8mg of Coumadin daily and will be rechecking INR again on 1/19/21.     Future Appointments   Date Time Provider Cora Palafox   1/

## 2021-12-22 NOTE — TELEPHONE ENCOUNTER
Wants to know when is pts next INR check and wants to know pts current Warfarin dose. Would like a call back.

## 2022-01-06 RX ORDER — WARFARIN SODIUM 6 MG/1
TABLET ORAL
Qty: 18 TABLET | Refills: 0 | Status: SHIPPED | OUTPATIENT
Start: 2022-01-06 | End: 2022-01-25

## 2022-01-06 NOTE — TELEPHONE ENCOUNTER
Future appt:     Your appointments     Date & Time Appointment Department [facility])    Jan 18, 2022  8:45 AM CST Laboratory Visit with [id number] [facility] ([facility])            [facility]  [facility]  [address] [place] [address].

## 2022-01-18 ENCOUNTER — LAB ENCOUNTER (OUTPATIENT)
Dept: LAB | Age: 82
End: 2022-01-18
Attending: FAMILY MEDICINE
Payer: MEDICARE

## 2022-01-18 DIAGNOSIS — E78.49 FAMILIAL MULTIPLE LIPOPROTEIN-TYPE HYPERLIPIDEMIA: ICD-10-CM

## 2022-01-18 DIAGNOSIS — Z79.01 LONG TERM (CURRENT) USE OF ANTICOAGULANTS: ICD-10-CM

## 2022-01-18 LAB
ALBUMIN SERPL-MCNC: 3.6 G/DL (ref 3.4–5)
ALBUMIN/GLOB SERPL: 0.9 {RATIO} (ref 1–2)
ALP LIVER SERPL-CCNC: 89 U/L
ALT SERPL-CCNC: 23 U/L
ANION GAP SERPL CALC-SCNC: 6 MMOL/L (ref 0–18)
AST SERPL-CCNC: 24 U/L (ref 15–37)
BILIRUB SERPL-MCNC: 1.1 MG/DL (ref 0.1–2)
BUN BLD-MCNC: 27 MG/DL (ref 7–18)
CALCIUM BLD-MCNC: 9 MG/DL (ref 8.5–10.1)
CHLORIDE SERPL-SCNC: 106 MMOL/L (ref 98–112)
CHOLEST SERPL-MCNC: 145 MG/DL (ref ?–200)
CO2 SERPL-SCNC: 28 MMOL/L (ref 21–32)
CREAT BLD-MCNC: 0.86 MG/DL
FASTING PATIENT LIPID ANSWER: YES
FASTING STATUS PATIENT QL REPORTED: YES
GLOBULIN PLAS-MCNC: 3.8 G/DL (ref 2.8–4.4)
GLUCOSE BLD-MCNC: 104 MG/DL (ref 70–99)
HDLC SERPL-MCNC: 40 MG/DL (ref 40–59)
INR BLD: 1.85 (ref 0.8–1.2)
LDLC SERPL CALC-MCNC: 91 MG/DL (ref ?–100)
MAGNESIUM SERPL-MCNC: 2.2 MG/DL (ref 1.6–2.6)
NONHDLC SERPL-MCNC: 105 MG/DL (ref ?–130)
OSMOLALITY SERPL CALC.SUM OF ELEC: 295 MOSM/KG (ref 275–295)
POTASSIUM SERPL-SCNC: 4.2 MMOL/L (ref 3.5–5.1)
PROT SERPL-MCNC: 7.4 G/DL (ref 6.4–8.2)
PROTHROMBIN TIME: 21.4 SECONDS (ref 11.6–14.8)
SODIUM SERPL-SCNC: 140 MMOL/L (ref 136–145)
TRIGL SERPL-MCNC: 72 MG/DL (ref 30–149)
VLDLC SERPL CALC-MCNC: 12 MG/DL (ref 0–30)

## 2022-01-18 PROCEDURE — 83735 ASSAY OF MAGNESIUM: CPT

## 2022-01-18 PROCEDURE — 85610 PROTHROMBIN TIME: CPT

## 2022-01-18 PROCEDURE — 36415 COLL VENOUS BLD VENIPUNCTURE: CPT

## 2022-01-18 PROCEDURE — 80061 LIPID PANEL: CPT

## 2022-01-18 PROCEDURE — 80053 COMPREHEN METABOLIC PANEL: CPT

## 2022-01-19 ENCOUNTER — ANTI-COAG VISIT (OUTPATIENT)
Dept: FAMILY MEDICINE CLINIC | Facility: CLINIC | Age: 82
End: 2022-01-19

## 2022-01-19 DIAGNOSIS — I48.21 PERMANENT ATRIAL FIBRILLATION (HCC): ICD-10-CM

## 2022-01-19 DIAGNOSIS — Z79.01 LONG TERM (CURRENT) USE OF ANTICOAGULANTS: ICD-10-CM

## 2022-01-19 NOTE — PATIENT INSTRUCTIONS
INR is still low at 1.7  Continue same coumadin dose of 9mg daily  Continue Lovenox  INR on Wednesday None

## 2022-01-24 NOTE — TELEPHONE ENCOUNTER
Future appt:     Your appointments     Date & Time Appointment Department [facility])    Jan 27, 2022 11:00 AM CST Laboratory Visit with [address] [facility] ([facility] Don[facility]amud)            [facility]  [facility]  [facility].

## 2022-01-25 RX ORDER — WARFARIN SODIUM 6 MG/1
TABLET ORAL
Qty: 30 TABLET | Refills: 0 | Status: SHIPPED | OUTPATIENT
Start: 2022-01-25

## 2022-01-27 ENCOUNTER — ANTI-COAG VISIT (OUTPATIENT)
Dept: FAMILY MEDICINE CLINIC | Facility: CLINIC | Age: 82
End: 2022-01-27

## 2022-01-27 ENCOUNTER — LABORATORY ENCOUNTER (OUTPATIENT)
Dept: LAB | Age: 82
End: 2022-01-27
Attending: FAMILY MEDICINE
Payer: MEDICARE

## 2022-01-27 DIAGNOSIS — I48.21 PERMANENT ATRIAL FIBRILLATION (HCC): ICD-10-CM

## 2022-01-27 DIAGNOSIS — Z79.01 LONG TERM (CURRENT) USE OF ANTICOAGULANTS: ICD-10-CM

## 2022-01-27 LAB
INR BLD: 2.55 (ref 0.8–1.2)
PROTHROMBIN TIME: 27.6 SECONDS (ref 11.6–14.8)

## 2022-01-27 PROCEDURE — 36415 COLL VENOUS BLD VENIPUNCTURE: CPT

## 2022-01-27 PROCEDURE — 85610 PROTHROMBIN TIME: CPT

## 2022-01-28 NOTE — PROGRESS NOTES
Spoke with pt-   Pt denied any concerns at this time. Recheck INR scheduled for in one month. Message relayed to Wendi Rivera (spoke with Tracee Santiago)- pt gets pick packs.     Future Appointments   Date Time Provider Cora Palafox   2/24/2022 11:00 AM REF DEACON ANGEL

## 2022-02-09 RX ORDER — CITALOPRAM 10 MG/1
TABLET ORAL
Qty: 90 TABLET | Refills: 0 | Status: SHIPPED | OUTPATIENT
Start: 2022-02-09

## 2022-02-17 RX ORDER — WARFARIN SODIUM 6 MG/1
TABLET ORAL
Qty: 30 TABLET | Refills: 1 | Status: SHIPPED | OUTPATIENT
Start: 2022-02-17

## 2022-02-17 NOTE — TELEPHONE ENCOUNTER
Future appt: Your appointments     Date & Time Appointment Department [facility])    Feb 24, 2022 11:00 AM CST Laboratory Visit with REF [facility] ([facility] [place])        Mar 01, 2022 11:00 AM CST Sleep Follow Up with [address] [facility] [address], [address] [place] [address], [address], [place] ([facility])            [facility] [place]  [id number]  [address] Ref Lab [place]  [id number]  [phone]          Last Appointment with provider:   12/6/2021 - px  Last appointment at [facility] [place]:  12/6/2021      Cholesterol, Total (mg/dL)   Date Value   01/18/2022 145     HDL Cholesterol (mg/dL)   Date Value   01/18/2022 40     LDL Cholesterol (mg/dL)   Date Value   01/18/2022 91     Triglycerides (mg/dL)   Date Value   01/18/2022 72     No results found for: EAG, A1C  Lab Results   Component Value Date    T4F 1.1 04/06/2021    TSH 3.050 04/06/2021       No follow-ups on file.

## 2022-02-24 ENCOUNTER — LABORATORY ENCOUNTER (OUTPATIENT)
Dept: LAB | Age: 82
End: 2022-02-24
Attending: FAMILY MEDICINE
Payer: MEDICARE

## 2022-02-24 DIAGNOSIS — Z79.01 LONG TERM (CURRENT) USE OF ANTICOAGULANTS: ICD-10-CM

## 2022-02-24 LAB
INR BLD: 2.86 (ref 0.8–1.2)
PROTHROMBIN TIME: 30.2 SECONDS (ref 11.6–14.8)

## 2022-02-24 PROCEDURE — 85610 PROTHROMBIN TIME: CPT

## 2022-02-24 PROCEDURE — 36415 COLL VENOUS BLD VENIPUNCTURE: CPT

## 2022-02-25 ENCOUNTER — TELEPHONE (OUTPATIENT)
Dept: FAMILY MEDICINE CLINIC | Facility: CLINIC | Age: 82
End: 2022-02-25

## 2022-02-25 ENCOUNTER — ANTI-COAG VISIT (OUTPATIENT)
Dept: FAMILY MEDICINE CLINIC | Facility: CLINIC | Age: 82
End: 2022-02-25

## 2022-02-25 NOTE — PROGRESS NOTES
Pt informed. Pt is doing well. appt's scheduled for recheck on INR in one month and medical f/u due in June. Called Aly and LM- pt gets pill packs.     Future Appointments   Date Time Provider Cora Vivienne   3/1/2022 11:00 AM HAMILTON Guerra [facility]   3/24/2022 11:45 AM REF [facility]   6/10/2022 11:00 AM Sam Piper MD [facility] [place]

## 2022-02-25 NOTE — PROGRESS NOTES
----- Message from Roula Covington MD sent at 2/24/2022  8:10 PM CST -----  IN% 2.86- in range, cpm  Recheck 1 month

## 2022-02-25 NOTE — TELEPHONE ENCOUNTER
----- Message from Farhad Churchill MD sent at 2/24/2022  8:10 PM CST -----  IN% 2.86- in range, cpm  Recheck 1 month

## 2022-02-26 ENCOUNTER — TELEPHONE (OUTPATIENT)
Dept: FAMILY MEDICINE CLINIC | Facility: CLINIC | Age: 82
End: 2022-02-26

## 2022-02-26 NOTE — TELEPHONE ENCOUNTER
On call provider. Patient feels she has infection in her lower arm up to her elbow, it's red and swollen, very hot, painful. She's had infections like this before but not as swollen/warm. No fever, no streaking up/down the arm. Was inquiring regarding an appointment today. Office closed, recommend immediate care evaluation.

## 2022-03-01 ENCOUNTER — OFFICE VISIT (OUTPATIENT)
Dept: FAMILY MEDICINE CLINIC | Facility: CLINIC | Age: 82
End: 2022-03-01
Payer: MEDICARE

## 2022-03-01 VITALS
DIASTOLIC BLOOD PRESSURE: 90 MMHG | TEMPERATURE: 97 F | RESPIRATION RATE: 22 BRPM | SYSTOLIC BLOOD PRESSURE: 122 MMHG | OXYGEN SATURATION: 98 % | BODY MASS INDEX: 41 KG/M2 | HEIGHT: 59 IN | HEART RATE: 88 BPM

## 2022-03-01 DIAGNOSIS — G47.33 OBSTRUCTIVE SLEEP APNEA: Primary | ICD-10-CM

## 2022-03-01 DIAGNOSIS — L03.114 CELLULITIS OF LEFT UPPER EXTREMITY: ICD-10-CM

## 2022-03-01 DIAGNOSIS — T78.40XA ALLERGIC REACTION TO DRUG, INITIAL ENCOUNTER: ICD-10-CM

## 2022-03-01 PROCEDURE — 99214 OFFICE O/P EST MOD 30 MIN: CPT | Performed by: NURSE PRACTITIONER

## 2022-03-01 RX ORDER — DOXYCYCLINE HYCLATE 100 MG/1
100 CAPSULE ORAL 2 TIMES DAILY
Qty: 20 CAPSULE | Refills: 0 | Status: SHIPPED | OUTPATIENT
Start: 2022-03-01 | End: 2022-03-11

## 2022-03-02 ENCOUNTER — OFFICE VISIT (OUTPATIENT)
Dept: FAMILY MEDICINE CLINIC | Facility: CLINIC | Age: 82
End: 2022-03-02
Payer: MEDICARE

## 2022-03-02 VITALS
RESPIRATION RATE: 18 BRPM | TEMPERATURE: 97 F | HEIGHT: 59 IN | BODY MASS INDEX: 41 KG/M2 | SYSTOLIC BLOOD PRESSURE: 128 MMHG | OXYGEN SATURATION: 95 % | HEART RATE: 77 BPM | DIASTOLIC BLOOD PRESSURE: 74 MMHG

## 2022-03-02 DIAGNOSIS — L03.114 CELLULITIS OF LEFT UPPER EXTREMITY: Primary | ICD-10-CM

## 2022-03-02 DIAGNOSIS — T78.40XA ALLERGIC REACTION TO DRUG, INITIAL ENCOUNTER: ICD-10-CM

## 2022-03-02 PROCEDURE — 99213 OFFICE O/P EST LOW 20 MIN: CPT | Performed by: NURSE PRACTITIONER

## 2022-03-02 RX ORDER — CETIRIZINE HYDROCHLORIDE 10 MG/1
10 TABLET ORAL DAILY
Qty: 30 TABLET | Refills: 0 | Status: SHIPPED | OUTPATIENT
Start: 2022-03-02

## 2022-03-02 NOTE — PATIENT INSTRUCTIONS
Continue the doxycycline. Can take Zyrtec in place of the Benadryl if too sleeping on the Benadryl. If itching and allergic rash not improving by Friday, call and will give a burst of prednisone. Otherwise follow up if the cellulitis doesn't continue to improve or worsens. NCCU PROGRESS NOTE      Patient: Ligia Ruano Date: 2021   : 1963   Attending: Bhargav Godoy MD   ? ?   Admission date: 2021    Overnight events:  Continues to improve every day.  Sat at edge of bed today.  Anxious overnight but improved this AM.  Weaned off HFNC and onto 6L NC.    Medications:  Current Facility-Administered Medications   Medication   • ibuprofen (MOTRIN) tablet 400 mg   • albuterol (VENTOLIN) nebulizer 2.5 mg   • hydrOXYzine (ATARAX) 10 MG/5ML oral syrup 26 mg   • psyllium (METAMUCIL MULTIHEALTH FIBER) 58.12 % packet 1 packet   • melatonin tablet 6 mg   • dexMEDETomidine (PRECEDEX) 400 mcg/100 mL in sodium chloride 0.9 % infusion   • lamoTRIgine (LaMICtal) tablet 25 mg   • lithium capsule 150 mg   • heparin (porcine) injection 5,000 Units   • ondansetron (ZOFRAN) injection 4 mg   • lidocaine (LIDOCARE) 4 % patch 1 patch   • docusate sodium-sennosides (SENOKOT S) 50-8.6 MG 2 tablet   • polyethylene glycol (MIRALAX) packet 17 g   • sodium citrate anticoagulant 4 % flush 3 mL   • albumin human 5 % injection 12.5 g   • lidocaine (LIDOCARE) 4 % patch 1 patch   • gabapentin (NEURONTIN) 250 MG/5ML solution 200 mg   • sodium chloride (PF) 0.9 % injection 2 mL   • bisacodyl (DULCOLAX) suppository 10 mg   • docusate sodium (ENEMEEZ MINI) 283 MG rectal enema 283 mg   • magnesium hydroxide (MILK OF MAGNESIA) 400 MG/5ML suspension 30 mL   • acetaminophen (TYLENOL) tablet 650 mg   • albuterol (VENTOLIN) nebulizer 2.5 mg   • hydroCORTisone (CORTIZONE) 1 % cream   • dextrose 50 % injection 25 g   • dextrose 50 % injection 12.5 g   • glucagon (GLUCAGEN) injection 1 mg   • dextrose (GLUTOSE) 40 % gel 15 g   • dextrose (GLUTOSE) 40 % gel 30 g   • insulin lispro (ADMELOG,HumaLOG) - Correction Dose      Objective:   Vital Last Value 24 Hour Range   Temperature 99.4 °F (37.4 °C) (21 0400) Temp  Min: 99.4 °F (37.4 °C)  Max: 99.7 °F (37.6 °C)   Pulse (!) 102 (21 1800) Pulse  Min: 92   Max: 114   Respiratory (!) 22 (08/18/21 1800) Resp  Min: 18  Max: 31   Non-Invasive  Blood Pressure (!) 140/94 (08/18/21 1800) BP  Min: 114/85  Max: 176/111   Pulse Oximetry 100 % (08/18/21 1800) SpO2  Min: 87 %  Max: 100 %   Arterial   Blood Pressure (!) 103/100 (08/16/21 1800) No data recorded     PERTINENT DIAGNOSTICS/PROCEDURES  I/O last 3 completed shifts:  In: 676.6 [I.V.:126.6; NG/GT:550]  Out: 1550 [Urine:1550]  I/O this shift:  In: -   Out: 500 [Urine:500]    LABS:  Last 24 hour labs were reviewed in detail.  Recent Labs   Lab 08/18/21  0404 08/17/21  0344 08/16/21  0342   SODIUM 142 140 143   POTASSIUM 3.8 3.5 3.6   CHLORIDE 108* 107 109*   CO2 26 26 29   ANIONGAP 12 11 9*   BUN 10 16 14   CREATININE 0.51 0.60 0.51   CALCIUM 8.8 8.6 9.0   GLUCOSE 114* 92 151*     Recent Labs   Lab 08/18/21  0404 08/17/21  0344 08/16/21  0342   BILIRUBIN 0.6 0.6 0.4   ALBUMIN 3.8 3.9 3.8   ALKPT 45 21* 42*   GPT 49 31 43   AST 34 19 31     Recent Labs   Lab 08/16/21  0342 08/13/21  0440 08/12/21  0429   MG 2.4 2.3 2.7*   PHOS 3.3 3.6 3.3     Recent Labs   Lab 08/18/21  0724 08/17/21  0344 08/16/21  0342   HGB 10.4* 9.7* 10.6*   HCT 33.3* 30.5* 34.3*    188 183   WBC 10.6 8.6 9.1       Physical Exam  General: awake, NAD  Cardiac: sinus, S1/S2 without m/r/g/c, 2+ radial pulses  Pulmonary: CTAB  Abdominal: Soft, nontender this AM, no rebound or guarding  Extremities: No lower extremity edema    Neuro:  Mental Status: Awake and alert but inattentive. Conversant, following commands.   Cranial Nerves:Pupils 3/3 to 2/2, very mild ptosis today, neck flexion 4/5 today, weak gag and cough but improved from yesterday.  Voice much stronger today.   Motor: able to wiggle left thumb, LLE flaccid, moving RUE antigravity strength and RLE across bed but not antigravity yet.    ASSESSMENT/PLAN    N: GBS, MFS +/- AMSAN (see 8/10 attestation re:EDX).  -  PLEX #5/5 completed.  Could concider IVIG if plateau but pt continues to improve  -  discontinue central line  - Discontinue pred (not MG)  - Pending ganglioside (GM1, GD1b, and GQ1b) Ab panel, HIV, MG panel (8/6)  - Continue GBP 200mg TID +melatonin 6mg qhs  - R frontal IPH w/IVH in setting of IV tPA use.   - Bipolar disorder, appreciate Psych recs, continue lamotrigine and lithium  - Started on PRN hydroxyzine for anxiety   -  precedex for sedation/comfort, may need to touch base with psych regarding escalating psych rx given difficulty weaning from gtt    C: HTN  - levophed off  - maintain euvolemia  - BP improved this AM, continue to monitor, consider starting low dose antiHTN in coming days.    R: neuromuscular respiratory failure.   -Resp status greatly improved.  Still requires frequent suctioning but weaned to 6L NC today.   - Can repeat dose of glycopyrrolate if needed    Renal:  CHEN    GI:  TF. Constipation, resolving, FCD in place. Needs at least 1 bm/day. RUW   US and LFTs wnl.  - belly pain improved.  Workup negative.  - Okay to resume tube feeds  - Bowel reg    Endo: SSI    Heme:   - CBC acceptable   - DVT s/p IVCf, Vasc Sx following    ID: no acute concerns     BEST PRACTICES:  -HOB greater than 30 deg  -Sedation/analgesia:Precedex. RASS 0 to -2  -VTE prophylaxis: SCDs, SQH  - GI prophylaxis: H2RA  - Nutrition: TF  - Therapy: PT/OT/SLP-communication assistance  - Code status:    I have spent greater than 35 minutes in direct patient care, independent of any billable procedures.  This includes but is not limited to reviewing all diagnostic studies, physical examination, collaboration with consultants, determination and initiation of management plans, and discussion with the patient and/or their family. This patient is critically ill as documented above and requires high complexity decision-making and active management to preserve life.      Jace Aguirre MD  8/18/2021 7:44 PM

## 2022-03-04 ENCOUNTER — ANTI-COAG VISIT (OUTPATIENT)
Dept: FAMILY MEDICINE CLINIC | Facility: CLINIC | Age: 82
End: 2022-03-04

## 2022-03-04 ENCOUNTER — TELEPHONE (OUTPATIENT)
Dept: FAMILY MEDICINE CLINIC | Facility: CLINIC | Age: 82
End: 2022-03-04

## 2022-03-04 ENCOUNTER — LABORATORY ENCOUNTER (OUTPATIENT)
Dept: LAB | Age: 82
End: 2022-03-04
Attending: NURSE PRACTITIONER
Payer: MEDICARE

## 2022-03-04 DIAGNOSIS — Z79.01 LONG TERM (CURRENT) USE OF ANTICOAGULANTS: ICD-10-CM

## 2022-03-04 LAB
INR BLD: 2.62 (ref 0.8–1.2)
PROTHROMBIN TIME: 28.2 SECONDS (ref 11.6–14.8)

## 2022-03-04 PROCEDURE — 36415 COLL VENOUS BLD VENIPUNCTURE: CPT

## 2022-03-04 PROCEDURE — 85610 PROTHROMBIN TIME: CPT

## 2022-03-04 RX ORDER — PREDNISONE 20 MG/1
20 TABLET ORAL DAILY
Qty: 5 TABLET | Refills: 0 | Status: SHIPPED | OUTPATIENT
Start: 2022-03-04 | End: 2022-03-09

## 2022-03-04 NOTE — TELEPHONE ENCOUNTER
Patient is still having itching and redness of arm. Is interested in prednisone as discussed at 3/2 office visit.

## 2022-03-04 NOTE — PROGRESS NOTES
Pt informed. Pt agreed with recommendations given. Roge Simeon contacted with update.     Future Appointments   Date Time Provider Cora Palafox   3/11/2022 11:30 AM REF [facility]   3/24/2022 11:45 AM REF [facility]   6/10/2022 11:00 AM Heriberto Millard MD [facility] [place]

## 2022-03-04 NOTE — TELEPHONE ENCOUNTER
Patient notified. Patient had not tried the Zyrtec instead of Benadryl but she will now. Will give another update on Monday, 3/7.

## 2022-03-10 ENCOUNTER — TELEPHONE (OUTPATIENT)
Dept: FAMILY MEDICINE CLINIC | Facility: CLINIC | Age: 82
End: 2022-03-10

## 2022-03-10 NOTE — TELEPHONE ENCOUNTER
Will wait until Saturday to see if needs more antibiotics. Since AHI is not improving, needs bipap titration study.

## 2022-03-10 NOTE — TELEPHONE ENCOUNTER
Pt verbalized understanding to follow up on sat.     Would like the sleep study order to go to [facility]

## 2022-03-10 NOTE — TELEPHONE ENCOUNTER
Spoke with patient. She was seen on 3/2/22 for cellulitis of left arm. The redness of the upper arm has decreased but she still has an area on her elbow that feels like a \"hard ball\" it is painful and red. Today is her last day of antibiotics. Scheduled an appt for sat for assessment. Do you want to extend the antibiotic until then? Pt also asked about her AHI. Last seen for sleep on 3/1/22. She states her AHI is still elevated at 16. Upper Respiratory Infection, Adult  An upper respiratory infection (URI) is a common viral infection of the nose, throat, and upper air passages that lead to the lungs. The most common type of URI is the common cold. URIs usually get better on their own, without medical treatment.  What are the causes?  A URI is caused by a virus. You may catch a virus by:  · Breathing in droplets from an infected person's cough or sneeze.  · Touching something that has been exposed to the virus (contaminated) and then touching your mouth, nose, or eyes.  What increases the risk?  You are more likely to get a URI if:  · You are very young or very old.  · It is jessica or winter.  · You have close contact with others, such as at a , school, or health care facility.  · You smoke.  · You have long-term (chronic) heart or lung disease.  · You have a weakened disease-fighting (immune) system.  · You have nasal allergies or asthma.  · You are experiencing a lot of stress.  · You work in an area that has poor air circulation.  · You have poor nutrition.  What are the signs or symptoms?  A URI usually involves some of the following symptoms:  · Runny or stuffy (congested) nose.  · Sneezing.  · Cough.  · Sore throat.  · Headache.  · Fatigue.  · Fever.  · Loss of appetite.  · Pain in your forehead, behind your eyes, and over your cheekbones (sinus pain).  · Muscle aches.  · Redness or irritation of the eyes.  · Pressure in the ears or face.  How is this diagnosed?  This condition may be diagnosed based on your medical history and symptoms, and a physical exam. Your health care provider may use a cotton swab to take a mucus sample from your nose (nasal swab). This sample can be tested to determine what virus is causing the illness.  How is this treated?  URIs usually get better on their own within 7-10 days. You can take steps at home to relieve your symptoms. Medicines cannot cure URIs, but your health care provider may recommend  certain medicines to help relieve symptoms, such as:  · Over-the-counter cold medicines.  · Cough suppressants. Coughing is a type of defense against infection that helps to clear the respiratory system, so take these medicines only as recommended by your health care provider.  · Fever-reducing medicines.  Follow these instructions at home:  Activity  · Rest as needed.  · If you have a fever, stay home from work or school until your fever is gone or until your health care provider says you are no longer contagious. Your health care provider may have you wear a face mask to prevent your infection from spreading.  Relieving symptoms  · Gargle with a salt-water mixture 3-4 times a day or as needed. To make a salt-water mixture, completely dissolve ½-1 tsp of salt in 1 cup of warm water.  · Use a cool-mist humidifier to add moisture to the air. This can help you breathe more easily.  Eating and drinking    · Drink enough fluid to keep your urine pale yellow.  · Eat soups and other clear broths.  General instructions    · Take over-the-counter and prescription medicines only as told by your health care provider. These include cold medicines, fever reducers, and cough suppressants.  · Do not use any products that contain nicotine or tobacco, such as cigarettes and e-cigarettes. If you need help quitting, ask your health care provider.  · Stay away from secondhand smoke.  · Stay up to date on all immunizations, including the yearly (annual) flu vaccine.  · Keep all follow-up visits as told by your health care provider. This is important.  How to prevent the spread of infection to others    · URIs can be passed from person to person (are contagious). To prevent the infection from spreading:  ? Wash your hands often with soap and water. If soap and water are not available, use hand .  ? Avoid touching your mouth, face, eyes, or nose.  ? Cough or sneeze into a tissue or your sleeve or elbow instead of into your hand  or into the air.  Contact a health care provider if:  · You are getting worse instead of better.  · You have a fever or chills.  · Your mucus is brown or red.  · You have yellow or brown discharge coming from your nose.  · You have pain in your face, especially when you bend forward.  · You have swollen neck glands.  · You have pain while swallowing.  · You have white areas in the back of your throat.  Get help right away if:  · You have shortness of breath that gets worse.  · You have severe or persistent:  ? Headache.  ? Ear pain.  ? Sinus pain.  ? Chest pain.  · You have chronic lung disease along with any of the following:  ? Wheezing.  ? Prolonged cough.  ? Coughing up blood.  ? A change in your usual mucus.  · You have a stiff neck.  · You have changes in your:  ? Vision.  ? Hearing.  ? Thinking.  ? Mood.  Summary  · An upper respiratory infection (URI) is a common infection of the nose, throat, and upper air passages that lead to the lungs.  · A URI is caused by a virus.  · URIs usually get better on their own within 7-10 days.  · Medicines cannot cure URIs, but your health care provider may recommend certain medicines to help relieve symptoms.  This information is not intended to replace advice given to you by your health care provider. Make sure you discuss any questions you have with your health care provider.  Document Revised: 12/26/2019 Document Reviewed: 08/03/2018  [employer] Patient Education © 2021 [employer]

## 2022-03-11 ENCOUNTER — LABORATORY ENCOUNTER (OUTPATIENT)
Dept: LAB | Age: 82
End: 2022-03-11
Attending: FAMILY MEDICINE
Payer: MEDICARE

## 2022-03-11 ENCOUNTER — ANTI-COAG VISIT (OUTPATIENT)
Dept: FAMILY MEDICINE CLINIC | Facility: CLINIC | Age: 82
End: 2022-03-11

## 2022-03-11 DIAGNOSIS — Z79.01 LONG TERM (CURRENT) USE OF ANTICOAGULANTS: ICD-10-CM

## 2022-03-11 LAB
INR BLD: 3.69 (ref 0.8–1.2)
PROTHROMBIN TIME: 36.9 SECONDS (ref 11.6–14.8)

## 2022-03-11 PROCEDURE — 36415 COLL VENOUS BLD VENIPUNCTURE: CPT

## 2022-03-11 PROCEDURE — 85610 PROTHROMBIN TIME: CPT

## 2022-03-12 ENCOUNTER — OFFICE VISIT (OUTPATIENT)
Dept: FAMILY MEDICINE CLINIC | Facility: CLINIC | Age: 82
End: 2022-03-12
Payer: MEDICARE

## 2022-03-12 VITALS
TEMPERATURE: 97 F | RESPIRATION RATE: 20 BRPM | HEART RATE: 78 BPM | OXYGEN SATURATION: 95 % | SYSTOLIC BLOOD PRESSURE: 128 MMHG | DIASTOLIC BLOOD PRESSURE: 70 MMHG

## 2022-03-12 DIAGNOSIS — L03.114 CELLULITIS OF LEFT UPPER EXTREMITY: Primary | ICD-10-CM

## 2022-03-12 DIAGNOSIS — Z79.01 LONG TERM (CURRENT) USE OF ANTICOAGULANTS: ICD-10-CM

## 2022-03-12 LAB
BASOPHILS # BLD AUTO: 0.08 X10(3) UL (ref 0–0.2)
BASOPHILS NFR BLD AUTO: 0.7 %
EOSINOPHIL # BLD AUTO: 0.49 X10(3) UL (ref 0–0.7)
EOSINOPHIL NFR BLD AUTO: 4.3 %
ERYTHROCYTE [DISTWIDTH] IN BLOOD BY AUTOMATED COUNT: 13.7 %
ERYTHROCYTE [SEDIMENTATION RATE] IN BLOOD: 16 MM/HR
HCT VFR BLD AUTO: 44.2 %
HGB BLD-MCNC: 13.6 G/DL
IMM GRANULOCYTES # BLD AUTO: 0.06 X10(3) UL (ref 0–1)
IMM GRANULOCYTES NFR BLD: 0.5 %
LYMPHOCYTES # BLD AUTO: 1.35 X10(3) UL (ref 1–4)
LYMPHOCYTES NFR BLD AUTO: 11.9 %
MCH RBC QN AUTO: 29 PG (ref 26–34)
MCHC RBC AUTO-ENTMCNC: 30.8 G/DL (ref 31–37)
MCV RBC AUTO: 94.2 FL
MONOCYTES # BLD AUTO: 0.9 X10(3) UL (ref 0.1–1)
MONOCYTES NFR BLD AUTO: 7.9 %
NEUTROPHILS # BLD AUTO: 8.47 X10 (3) UL (ref 1.5–7.7)
NEUTROPHILS # BLD AUTO: 8.47 X10(3) UL (ref 1.5–7.7)
NEUTROPHILS NFR BLD AUTO: 74.7 %
PLATELET # BLD AUTO: 252 10(3)UL (ref 150–450)
RBC # BLD AUTO: 4.69 X10(6)UL
WBC # BLD AUTO: 11.4 X10(3) UL (ref 4–11)

## 2022-03-12 PROCEDURE — 99214 OFFICE O/P EST MOD 30 MIN: CPT | Performed by: NURSE PRACTITIONER

## 2022-03-12 PROCEDURE — 36415 COLL VENOUS BLD VENIPUNCTURE: CPT

## 2022-03-12 PROCEDURE — 85652 RBC SED RATE AUTOMATED: CPT

## 2022-03-12 PROCEDURE — 85025 COMPLETE CBC W/AUTO DIFF WBC: CPT

## 2022-03-12 RX ORDER — DOXYCYCLINE HYCLATE 100 MG/1
100 CAPSULE ORAL 2 TIMES DAILY
Qty: 20 CAPSULE | Refills: 0 | Status: SHIPPED | OUTPATIENT
Start: 2022-03-12 | End: 2022-03-22

## 2022-03-12 NOTE — PROGRESS NOTES
Pt has appt today in office with CS-  Will route to NP    Future Appointments   Date Time Provider Cora Palafox   3/12/2022 11:15 AM HAMILTON Saxena [facility] [place]   3/24/2022 11:45 AM REF [facility]   6/10/2022 11:00 AM Kris Nicole MD [facility] [place]

## 2022-03-12 NOTE — PATIENT INSTRUCTIONS
Labs pending. Restart Doxycycline. Recheck INR on Tuesday    X-ray of elbow on Tuesday. Follow up based on test results.

## 2022-03-14 ENCOUNTER — TELEPHONE (OUTPATIENT)
Dept: FAMILY MEDICINE CLINIC | Facility: CLINIC | Age: 82
End: 2022-03-14

## 2022-03-14 NOTE — TELEPHONE ENCOUNTER
Spoke with pt regarding the below. She is scheduled for xray tomorrow. Will complete antibiotics. No questions at this time.

## 2022-03-14 NOTE — TELEPHONE ENCOUNTER
----- Message from HAMILTON Pelletier sent at 3/12/2022  7:22 PM CST -----  Labs indicate an infection. Complete the course of doxycycline. Recommend the x-ray. If not improving, refer to surgeon for evaluation. Note to MyChart.

## 2022-03-15 ENCOUNTER — LABORATORY ENCOUNTER (OUTPATIENT)
Dept: LAB | Age: 82
End: 2022-03-15
Attending: FAMILY MEDICINE
Payer: MEDICARE

## 2022-03-15 ENCOUNTER — HOSPITAL ENCOUNTER (OUTPATIENT)
Dept: GENERAL RADIOLOGY | Age: 82
Discharge: HOME | End: 2022-03-15
Attending: NURSE PRACTITIONER
Payer: MEDICARE

## 2022-03-15 DIAGNOSIS — L03.114 CELLULITIS OF LEFT UPPER EXTREMITY: ICD-10-CM

## 2022-03-15 DIAGNOSIS — Z79.01 LONG TERM (CURRENT) USE OF ANTICOAGULANTS: ICD-10-CM

## 2022-03-15 LAB
INR BLD: 2.34 (ref 0.8–1.2)
PROTHROMBIN TIME: 25.8 SECONDS (ref 11.6–14.8)

## 2022-03-15 PROCEDURE — 73080 X-RAY EXAM OF ELBOW: CPT | Performed by: NURSE PRACTITIONER

## 2022-03-15 PROCEDURE — 85610 PROTHROMBIN TIME: CPT

## 2022-03-15 PROCEDURE — 36415 COLL VENOUS BLD VENIPUNCTURE: CPT

## 2022-03-16 ENCOUNTER — ANTI-COAG VISIT (OUTPATIENT)
Dept: FAMILY MEDICINE CLINIC | Facility: CLINIC | Age: 82
End: 2022-03-16

## 2022-03-16 ENCOUNTER — TELEPHONE (OUTPATIENT)
Dept: FAMILY MEDICINE CLINIC | Facility: CLINIC | Age: 82
End: 2022-03-16

## 2022-03-16 NOTE — TELEPHONE ENCOUNTER
Spoke with pt regarding the below. She will let me know if symptoms have not improved after antibiotics.

## 2022-03-16 NOTE — TELEPHONE ENCOUNTER
----- Message from HAMILTON Walter sent at 3/15/2022  5:06 PM CDT -----  X-ray consistent with cellulitis. Will refer to surgeon if not better in after this course of antibiotics. Note to MyChart.

## 2022-03-16 NOTE — PROGRESS NOTES
Spoke with pt regarding below results and change to 7mg on Friday. Called [facility] and updated them with new dose.

## 2022-03-24 ENCOUNTER — LABORATORY ENCOUNTER (OUTPATIENT)
Dept: LAB | Age: 82
End: 2022-03-24
Attending: FAMILY MEDICINE
Payer: MEDICARE

## 2022-03-24 DIAGNOSIS — L03.114 CELLULITIS OF LEFT UPPER EXTREMITY: ICD-10-CM

## 2022-03-24 DIAGNOSIS — Z79.01 LONG TERM (CURRENT) USE OF ANTICOAGULANTS: ICD-10-CM

## 2022-03-24 LAB — PROTHROMBIN TIME: 32.6 SECONDS (ref 11.6–14.8)

## 2022-03-24 PROCEDURE — 36415 COLL VENOUS BLD VENIPUNCTURE: CPT

## 2022-03-24 PROCEDURE — 85610 PROTHROMBIN TIME: CPT

## 2022-03-25 ENCOUNTER — ANTI-COAG VISIT (OUTPATIENT)
Dept: FAMILY MEDICINE CLINIC | Facility: CLINIC | Age: 82
End: 2022-03-25

## 2022-03-28 NOTE — PROGRESS NOTES
Spoke with pt - pt states she is having some troubles with her phone. Pt was on antx for cellulitis. Pt finished Doxycycline last week Monday. Pt states she still has a bump -in the left arm (in area of elbow). Pt states bump is smaller but still has a spot that is tender- no redness reported. Pt states she is taking Coumdin 7mg on Friday and Monday nad 8 mg the other days. Please advise.

## 2022-03-28 NOTE — PROGRESS NOTES
Take 7 mg today and tomorrow. Then resume the usual schedule of 7 mg M and F with 8 mg the rest of the week   Recheck in 2 weeks. Since the hematoma is not resolving - referred to surgeon, Dr. Michelle Yañez.

## 2022-03-28 NOTE — PROGRESS NOTES
Patient notified of these instructions and adjusted pill pack today and tomorrow. Patient will also contact office of Dr. Luis Vail for left elbow hematoma. [facility] notified of change in Coumadin dosing for pill packs.

## 2022-04-07 ENCOUNTER — LABORATORY ENCOUNTER (OUTPATIENT)
Dept: LAB | Age: 82
End: 2022-04-07
Attending: FAMILY MEDICINE
Payer: MEDICARE

## 2022-04-07 DIAGNOSIS — Z79.01 LONG TERM (CURRENT) USE OF ANTICOAGULANTS: Primary | ICD-10-CM

## 2022-04-07 LAB
INR BLD: 3.16 (ref 0.8–1.2)
PROTHROMBIN TIME: 32.7 SECONDS (ref 11.6–14.8)

## 2022-04-07 PROCEDURE — 85610 PROTHROMBIN TIME: CPT

## 2022-04-07 PROCEDURE — 36415 COLL VENOUS BLD VENIPUNCTURE: CPT

## 2022-04-08 ENCOUNTER — ANTI-COAG VISIT (OUTPATIENT)
Dept: FAMILY MEDICINE CLINIC | Facility: CLINIC | Age: 82
End: 2022-04-08

## 2022-04-08 NOTE — PROGRESS NOTES
Called pt-  Pt had preprocedural covid test today.   Pt is having a sleep study on Monday, 4/11 ordered 78 Yulia Ramirez NP.

## 2022-04-08 NOTE — PROGRESS NOTES
Pt called back,  Recommendations relayed. Pt mentioned she didn't have any cabbage or blueberries this week- pt states usually she has some every week. Also, pt mentioned pt was seen per Dr. Fariba Orantes at [facility] yesterday for routine check was told she had blood behind the right ey- pt is scheduled to see DR. Baldomero Ly now on 4/15. Also pt has appt with Dr. Fariba Orantes on 4/27 for skin biopsy of eye lids- pt has bumps that look suspicious. 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 Clarke Ovalles called with pill pack update.       Future Appointments   Date Time Provider Cora Palafox   4/15/2022 10:30 AM REF [place] [facility]   6/10/2022 11:00 AM Micaela Ryan MD [facility] [place] [facility] [place]

## 2022-04-08 NOTE — PROGRESS NOTES
NR 3.16- slightly high  Goal 2-3    Encourage hold coumadin today, then 7 mg M/W/F/Sat, 8 mg other days. Recheck INR 1 week    Pt note reviewed.  Pt to have INR before any surgical procedure otherwise check in 1 week

## 2022-04-11 ENCOUNTER — TELEPHONE (OUTPATIENT)
Dept: FAMILY MEDICINE CLINIC | Facility: CLINIC | Age: 82
End: 2022-04-11

## 2022-04-11 LAB — SARS COV2 COVID19 NAAT: NEGATIVE

## 2022-04-11 NOTE — TELEPHONE ENCOUNTER
Pt had a negative covid test on 4/8/22 for sleep study tonight. Informed pt covid test was negative.

## 2022-04-15 ENCOUNTER — ANTI-COAG VISIT (OUTPATIENT)
Dept: FAMILY MEDICINE CLINIC | Facility: CLINIC | Age: 82
End: 2022-04-15

## 2022-04-15 ENCOUNTER — LABORATORY ENCOUNTER (OUTPATIENT)
Dept: LAB | Age: 82
End: 2022-04-15
Attending: FAMILY MEDICINE
Payer: MEDICARE

## 2022-04-15 ENCOUNTER — TELEPHONE (OUTPATIENT)
Dept: FAMILY MEDICINE CLINIC | Facility: CLINIC | Age: 82
End: 2022-04-15

## 2022-04-15 DIAGNOSIS — Z79.01 LONG TERM (CURRENT) USE OF ANTICOAGULANTS: Primary | ICD-10-CM

## 2022-04-15 LAB
INR BLD: 2.88 (ref 0.8–1.2)
PROTHROMBIN TIME: 30.3 SECONDS (ref 11.6–14.8)

## 2022-04-15 PROCEDURE — 36415 COLL VENOUS BLD VENIPUNCTURE: CPT

## 2022-04-15 PROCEDURE — 85610 PROTHROMBIN TIME: CPT

## 2022-04-15 NOTE — PROGRESS NOTES
Pt informed. Pt agreed to CPM and recheck INR one month. Attempted to call Felicia Lennon- call directed to [employer]. Will call on Monday. Pt scheduled for INR recheck in one month.     Future Appointments   Date Time Provider Cora Palafox   5/13/2022 11:00 AM REF [place] [facility]   6/10/2022 11:00 AM Atif Rogel MD [facility] [place] [facility] [place]

## 2022-04-15 NOTE — TELEPHONE ENCOUNTER
Aimee Pittman called to follow up on Coumadin instructions. Tin Palacios informed INR drawn today is still pending. Will call back to Aimee Pittman when INR results and instructions available.     Future Appointments   Date Time Provider Cora Palafox   6/10/2022 11:00 AM Dg Pearce MD [facility] [place] [facility]

## 2022-04-15 NOTE — PROGRESS NOTES
MD Carlos Enrique Rosado [facility]  Inr-- in range   Continue present care   Recheck 4 weeks

## 2022-04-15 NOTE — TELEPHONE ENCOUNTER
Need to know when next INR appt and current dosage of meds for pill pack that is due to go out on 4/18/22.

## 2022-04-15 NOTE — TELEPHONE ENCOUNTER
Heywood Apgar called back / she tried to reach out to patient but was unsuccessful. Patient may have taken 8 mg on 22. Dosage was supposed to be 7 mg , , , and  / 8 mg the rest of the day.

## 2022-04-15 NOTE — TELEPHONE ENCOUNTER
Spoke with pt. Pt states she is following her routine Coumadin schedule. Pt wanted to relay update to CR. Pt states she was seen back at [facility] and had testing completed with dye. Pt states she was told she had a stroke- evidence noted in right eye. Pt will be returning for eye injection in 2 weeks. Pt states she will have Dr. Kiara Foreman forward notes to CR. Pt states she will call on Monday to f/u on INR report from draw today (still in process by the lab).

## 2022-04-18 NOTE — PROGRESS NOTES
Spoke with Arias Hoang at [facility]. Update on schedule and f/u given.

## 2022-04-18 NOTE — TELEPHONE ENCOUNTER
Fatuma Adrian @ [place] on [address] returning your call  [phone]  Future Appointments   Date Time Provider Cora Palafox   5/13/2022 11:00 AM REF [place] [facility]   6/10/2022 11:00 AM Ren Davis MD [facility] [place] [facility]

## 2022-04-18 NOTE — TELEPHONE ENCOUNTER
Spoke with Sue Chavez at [facility]. See anti-coag encounter dated 4/15/22. Update given re: current Coumadin schedule and f/u. Next INR scheduled on 5/13/22.     Future Appointments   Date Time Provider Cora Palafox   5/13/2022 11:00 AM REF [place] [facility]   6/10/2022 11:00 AM Anuj Whittaker MD [facility] [place] [facility] [place]

## 2022-04-19 ENCOUNTER — TELEPHONE (OUTPATIENT)
Dept: FAMILY MEDICINE CLINIC | Facility: CLINIC | Age: 82
End: 2022-04-19

## 2022-04-25 NOTE — TELEPHONE ENCOUNTER
Order entered. Please fax to [facility] and let patient know. Also remind her to follow up about 2 weeks after using at home. Thank you.

## 2022-04-25 NOTE — TELEPHONE ENCOUNTER
Called [facility] for sleep study. Received results. Updated flowsheet and placed in the in-basket. Recommended Bipap 20/14.   Patient is currently on 14-15 Cpap  PLM- 150

## 2022-05-10 RX ORDER — CITALOPRAM 10 MG/1
TABLET ORAL
Qty: 90 TABLET | Refills: 0 | Status: SHIPPED | OUTPATIENT
Start: 2022-05-10 | End: 2022-05-10

## 2022-05-10 NOTE — TELEPHONE ENCOUNTER
Medication already refilled today for 90 tablets. Please clarify request from pharmacy.  They should not need further refill pending patient appointment    Future Appointments   Date Time Provider Cora Palafox   5/13/2022 11:00 AM REF [place] [facility]   6/10/2022 11:00 AM Anuj Whittaker MD [facility] [place] [facility] [place]

## 2022-05-10 NOTE — TELEPHONE ENCOUNTER
Future appt: Your appointments     Date & Time Appointment Department [facility])    May 13, 2022 11:00 AM CDT Laboratory Visit with REF Serena Santana Reference Lab ([facility] [place])        Antonio 10, 2022 11:00 AM CDT Exam - Established with Ramses Watts MD 25 [facility], [place] ([facility])            25 [facility]  [place] [id number]  [address] [facility] Reference [facility]  ED[facility] Ref Lab [place]  [place] [address] [id number]  [phone]        Last Appointment with provider:   12/6/2021Dkye Mora- pt was advised to f/u in 6 months  Last appointment at [facility] [place]:  3/12/2022    Citalopram refilled 2/9/22 for #90, 0 refills  Cholesterol, Total (mg/dL)   Date Value   01/18/2022 145     HDL Cholesterol (mg/dL)   Date Value   01/18/2022 40     LDL Cholesterol (mg/dL)   Date Value   01/18/2022 91     Triglycerides (mg/dL)   Date Value   01/18/2022 72     No results found for: EAG, A1C  Lab Results   Component Value Date    T4F 1.1 04/06/2021    TSH 3.050 04/06/2021       No follow-ups on file.

## 2022-05-10 NOTE — TELEPHONE ENCOUNTER
Called [facility],  Spoke with Vega Cole (pharmacy tech)-  Pharmacy is needing a short refill of #63 for pt sync- for a short fill for medication to align on same date. Pharmacy would like to keep 90 on file for refill, but would like to purse #63 at this time to short fill. Please advise.

## 2022-05-11 ENCOUNTER — TELEPHONE (OUTPATIENT)
Dept: FAMILY MEDICINE CLINIC | Facility: CLINIC | Age: 82
End: 2022-05-11

## 2022-05-11 RX ORDER — CITALOPRAM 10 MG/1
10 TABLET ORAL DAILY
Qty: 63 TABLET | Refills: 0 | Status: SHIPPED | OUTPATIENT
Start: 2022-06-13

## 2022-05-11 NOTE — TELEPHONE ENCOUNTER
ask for Arelis/ needs short fills for pill pack and 90 day refills. needs both.  90 day was cancelled

## 2022-05-11 NOTE — TELEPHONE ENCOUNTER
Arelis from [place] states they did get RX approval for the #63 Sync for Citalopram, however, they had noted that the #90 was canceled. Arelis states #90 is needed because they are filling 30 day in May and they will to use RX at this time. Pharmacy states they are not filling the RX for #63 until 6/13 for Sync , so Arelis states they have RX for #90, would like to disregard denial.  Otherwise, Arelis is willing to change #90, to #30 if needed. Please advise.       Future Appointments   Date Time Provider Cora Palafox   5/13/2022 12:00 PM REF [place] [facility]   6/10/2022 11:00 AM Xavier Harris MD [facility] [place] [facility] [place]

## 2022-05-24 ENCOUNTER — TELEPHONE (OUTPATIENT)
Dept: FAMILY MEDICINE CLINIC | Facility: CLINIC | Age: 82
End: 2022-05-24

## 2022-05-24 NOTE — TELEPHONE ENCOUNTER
Pt calling and states she needs to schedule HFU. HFU Brandyn: 5/14 - 5/23 Acute hypoxic respiratory failure from rhinovirus pneumonia    Pt has appt coming up and did not know if she should change this appt, come in sooner or have an additional appt. Pt aware PCP out and OTW for c/b until next week.     Your appointments     Date & Time Appointment Department [facility])    May 27, 2022 11:00 AM CDT Laboratory Visit with REF Andrew Reeders Reference Lab ([facility] Ref Lab [place])        Antonio 10, 2022 11:00 AM CDT Exam - Established with Ainsley Soria MD [address] [place] [address] ([facility])            [address] [place] [address] [facility] [place]  [place] [phone]  [address] Ref Lab [place]  [place] [address]

## 2022-05-25 NOTE — TELEPHONE ENCOUNTER
Spoke to pt who stated she is already schedule to se CR on 6/10/22 and is ok waiting until that appointment. Pt is feeling so much better than she did when she went to the hospital.   Pt is tracking her weight and bp as advised. Pt working on cutting back on salt intake. Advised pt to return to ED if sx return like when she first went to the hospital  If pt starts to feel worse call the office. Pt understands and is agreeable.

## 2022-05-25 NOTE — TELEPHONE ENCOUNTER
Does patient have a pulse oximeter at home? If not would recommend that she gets 1 to monitor her oxygen goal would be to keep it 95 or above. If O2 sat is lower than 95% recommend a follow-up visit sooner or if patient has any breathing problems or increased symptoms.

## 2022-05-26 RX ORDER — WARFARIN SODIUM 6 MG/1
TABLET ORAL
Qty: 30 TABLET | Refills: 2 | Status: SHIPPED | OUTPATIENT
Start: 2022-05-26

## 2022-05-26 RX ORDER — WARFARIN SODIUM 1 MG/1
TABLET ORAL
Qty: 52 TABLET | Refills: 2 | Status: SHIPPED | OUTPATIENT
Start: 2022-05-26

## 2022-05-26 NOTE — TELEPHONE ENCOUNTER
Future appt: Your appointments     Date & Time Appointment Department [facility])    May 27, 2022 11:00 AM CDT Laboratory Visit with REF Lynnette Contreras Reference Lab ([facility] Ref Lab [place])        Antonio 10, 2022 11:00 AM CDT Exam - Established with Xavier Harris MD 25 [facility] ([facility])            25 [facility]  [place] [id number]  [facility] [address] [facility] Reference Lab  ED[facility] Ref Lab [place]  [address] [address] [address]  [phone]        Last Appointment with provider:  Last physical 12/6/21   Last appointment at [facility] [place]:  3/12/2022  Cholesterol, Total (mg/dL)   Date Value   01/18/2022 145     HDL Cholesterol (mg/dL)   Date Value   01/18/2022 40     LDL Cholesterol (mg/dL)   Date Value   01/18/2022 91     Triglycerides (mg/dL)   Date Value   01/18/2022 72     No results found for: EAG, A1C  Lab Results   Component Value Date    T4F 1.1 04/06/2021    TSH 3.050 04/06/2021       No follow-ups on file.

## 2022-05-27 ENCOUNTER — TELEPHONE (OUTPATIENT)
Dept: FAMILY MEDICINE CLINIC | Facility: CLINIC | Age: 82
End: 2022-05-27

## 2022-05-27 ENCOUNTER — LABORATORY ENCOUNTER (OUTPATIENT)
Dept: LAB | Age: 82
End: 2022-05-27
Attending: FAMILY MEDICINE
Payer: MEDICARE

## 2022-05-27 DIAGNOSIS — Z79.01 LONG TERM (CURRENT) USE OF ANTICOAGULANTS: ICD-10-CM

## 2022-05-27 LAB
INR BLD: 1.6 (ref 0.8–1.2)
PROTHROMBIN TIME: 19.1 SECONDS (ref 11.6–14.8)

## 2022-05-27 PROCEDURE — 85610 PROTHROMBIN TIME: CPT

## 2022-05-27 PROCEDURE — 36415 COLL VENOUS BLD VENIPUNCTURE: CPT

## 2022-05-31 ENCOUNTER — ANTI-COAG VISIT (OUTPATIENT)
Dept: FAMILY MEDICINE CLINIC | Facility: CLINIC | Age: 82
End: 2022-05-31

## 2022-05-31 DIAGNOSIS — Z79.01 LONG TERM (CURRENT) USE OF ANTICOAGULANTS: ICD-10-CM

## 2022-05-31 DIAGNOSIS — I48.21 PERMANENT ATRIAL FIBRILLATION (HCC): ICD-10-CM

## 2022-05-31 NOTE — PROGRESS NOTES
Pt states she spoke with Dr. Clay Kawasaki office. Pt states she feels it will be easier for her to be managed by cardiology. Pt states she was told to take 8 mg today, 8mg tomorrow and recheck INR On Thursday 6/2/22. Pt stated she would f/u with CR on 6/10    LM with Aly- pt gets pill packs made- pharmacy asked to call back with any questions.   Future Appointments   Date Time Provider Cora Palafox   6/10/2022 11:00 AM Nicanor Hebert MD [facility] [place] [facility]

## 2022-05-31 NOTE — PROGRESS NOTES
Pt contacted- pt is waiting for call back from Dr. Usama Carter office. Pt states she will call us back.

## 2022-05-31 NOTE — PROGRESS NOTES
Spoke with pt. Pt states she was released from hospital last week Tues/Wed. Pt states she was admitted for 11 days for CHF, pneumonia, and asthma. Pt stated she is taking whatever she was told to take last-  Pt states she is still not feeling the greatest.  Pt stated she would call cardiologist this morning. Pt states she was very worried she didn't get a call re: her INR result. Pt states she was scared she was going to have a stroke. Routed back to  for review.

## 2022-06-02 LAB — AMB EXT COVID-19 RESULT: DETECTED

## 2022-06-09 ENCOUNTER — TELEPHONE (OUTPATIENT)
Dept: FAMILY MEDICINE CLINIC | Facility: CLINIC | Age: 82
End: 2022-06-09

## 2022-06-09 NOTE — TELEPHONE ENCOUNTER
Agree if COVID dx was 6/2 than it has not been 2 weeks yet - patient should only be seen in sick clinic until she is no longer contagious.

## 2022-06-09 NOTE — TELEPHONE ENCOUNTER
Patient tested positive for covid on 6/2/22 at [facility].     Patient is still having symptoms of illness and was diagnosed with pneumonia. LM for patient to reschedule her 6 month follow up tomorrow. Patient advised she may schedule same-day sick clinic visit for her ongoing symptoms if desired.

## 2022-06-09 NOTE — TELEPHONE ENCOUNTER
Pt to reschedule appointment til 21 days past positive test due to symptoms and condition- she should remain quarantined per cdc guidelines.  We can do video visit if she desires

## 2022-06-10 NOTE — TELEPHONE ENCOUNTER
Left detailed message for pt with recommendations and to reschedule appointment. Urged to call with questions, concerns, or if she would like to make her appointment a video visit.

## 2022-06-22 RX ORDER — IPRATROPIUM BROMIDE AND ALBUTEROL SULFATE 2.5; .5 MG/3ML; MG/3ML
SOLUTION RESPIRATORY (INHALATION)
Qty: 180 ML | Refills: 0 | Status: SHIPPED | OUTPATIENT
Start: 2022-06-22

## 2022-06-22 NOTE — TELEPHONE ENCOUNTER
Future appt: Your appointments     Date & Time Appointment Department [facility])    Jun 28, 2022 10:30 AM CDT Sleep Follow Up with Raffi Ayush, [address], [place] Gonsalez ([facility])        Jul 09, 2022  9:30 AM CDT Exam - Established with Melanie Rowe MD 25 [facility], [place]ons[place] ([facility])            25 [facility], [facility] [place]  [phone]  [phone]        Last Appointment with provider: 12/6/2021  Last appointment at [facility] [place]:  3/12/2022  Last px: 12/6/2021    Cholesterol, Total (mg/dL)   Date Value   01/18/2022 145     HDL Cholesterol (mg/dL)   Date Value   01/18/2022 40     LDL Cholesterol (mg/dL)   Date Value   01/18/2022 91     Triglycerides (mg/dL)   Date Value   01/18/2022 72     No results found for: EAG, A1C  Lab Results   Component Value Date    T4F 1.1 04/06/2021    TSH 3.050 04/06/2021       No follow-ups on file.

## 2022-06-28 ENCOUNTER — OFFICE VISIT (OUTPATIENT)
Dept: FAMILY MEDICINE CLINIC | Facility: CLINIC | Age: 82
End: 2022-06-28
Payer: MEDICARE

## 2022-06-28 VITALS
SYSTOLIC BLOOD PRESSURE: 102 MMHG | OXYGEN SATURATION: 95 % | TEMPERATURE: 97 F | HEART RATE: 69 BPM | RESPIRATION RATE: 20 BRPM | HEIGHT: 59 IN | BODY MASS INDEX: 42.06 KG/M2 | WEIGHT: 208.63 LBS | DIASTOLIC BLOOD PRESSURE: 70 MMHG

## 2022-06-28 DIAGNOSIS — G47.33 OBSTRUCTIVE SLEEP APNEA: Primary | ICD-10-CM

## 2022-06-28 PROCEDURE — 99214 OFFICE O/P EST MOD 30 MIN: CPT | Performed by: NURSE PRACTITIONER

## 2022-07-08 ENCOUNTER — TELEPHONE (OUTPATIENT)
Dept: FAMILY MEDICINE CLINIC | Facility: CLINIC | Age: 82
End: 2022-07-08

## 2022-07-08 NOTE — TELEPHONE ENCOUNTER
----- Message from Billy Dudley MD sent at 7/8/2022  4:28 PM CDT -----  Pt in ED for low potassium.  Has appointment scheduled tomorrow for preop- this will need to be rescheduled as I will not be able to clear her with this issue that quickly ( if she does get discharged)-- ok to wait to call her in am

## 2022-07-11 NOTE — TELEPHONE ENCOUNTER
Pt called back, post hospital appt scheduled.     Future Appointments   Date Time Provider Cora Vivienne   7/19/2022  3:00 PM Maxwell Soares MD [facility] [place] [facility] [place]   1/6/2023 11:00 AM Jeannine Clark APRN [facility] [place] [facility] [place]

## 2022-07-11 NOTE — TELEPHONE ENCOUNTER
Pt was discharged from [facility] 7/10/22. Pt is tired. Pt was told to f/u within 2 wks. Pt potassium was 3.4 upon discharge. Pt was planning to go to [facility] for lab check on Friday- pt was told to check level this week. Pt is wanting to reschedule appt with CR    Please advise which day pt should f/u- pt states she can do lab in office with CR if needed.

## 2022-07-15 ENCOUNTER — PATIENT OUTREACH (OUTPATIENT)
Dept: FAMILY MEDICINE CLINIC | Facility: CLINIC | Age: 82
End: 2022-07-15

## 2022-07-19 ENCOUNTER — LAB ENCOUNTER (OUTPATIENT)
Dept: LAB | Age: 82
End: 2022-07-19
Attending: FAMILY MEDICINE
Payer: MEDICARE

## 2022-07-19 ENCOUNTER — OFFICE VISIT (OUTPATIENT)
Dept: FAMILY MEDICINE CLINIC | Facility: CLINIC | Age: 82
End: 2022-07-19
Payer: MEDICARE

## 2022-07-19 VITALS
TEMPERATURE: 98 F | OXYGEN SATURATION: 98 % | BODY MASS INDEX: 43 KG/M2 | WEIGHT: 211 LBS | HEART RATE: 76 BPM | RESPIRATION RATE: 22 BRPM | DIASTOLIC BLOOD PRESSURE: 78 MMHG | SYSTOLIC BLOOD PRESSURE: 128 MMHG

## 2022-07-19 DIAGNOSIS — E87.6 HYPOKALEMIA: ICD-10-CM

## 2022-07-19 DIAGNOSIS — I25.10 ATHEROSCLEROSIS OF NATIVE CORONARY ARTERY OF NATIVE HEART WITHOUT ANGINA PECTORIS: ICD-10-CM

## 2022-07-19 DIAGNOSIS — I48.21 PERMANENT ATRIAL FIBRILLATION (HCC): ICD-10-CM

## 2022-07-19 DIAGNOSIS — I48.21 PERMANENT ATRIAL FIBRILLATION (HCC): Primary | ICD-10-CM

## 2022-07-19 DIAGNOSIS — Z79.01 LONG TERM (CURRENT) USE OF ANTICOAGULANTS: ICD-10-CM

## 2022-07-19 DIAGNOSIS — I50.32 CHRONIC DIASTOLIC CONGESTIVE HEART FAILURE (HCC): ICD-10-CM

## 2022-07-19 PROBLEM — H25.9 AGE-RELATED CATARACT OF RIGHT EYE: Status: ACTIVE | Noted: 2022-04-06

## 2022-07-19 PROBLEM — I34.0 NONRHEUMATIC MITRAL VALVE REGURGITATION: Status: ACTIVE | Noted: 2022-04-14

## 2022-07-19 LAB
ANION GAP SERPL CALC-SCNC: 3 MMOL/L (ref 0–18)
BUN BLD-MCNC: 17 MG/DL (ref 7–18)
CALCIUM BLD-MCNC: 8.7 MG/DL (ref 8.5–10.1)
CHLORIDE SERPL-SCNC: 109 MMOL/L (ref 98–112)
CO2 SERPL-SCNC: 26 MMOL/L (ref 21–32)
CREAT BLD-MCNC: 0.88 MG/DL
FASTING STATUS PATIENT QL REPORTED: NO
GLUCOSE BLD-MCNC: 120 MG/DL (ref 70–99)
INR BLD: 2.17 (ref 0.8–1.2)
OSMOLALITY SERPL CALC.SUM OF ELEC: 289 MOSM/KG (ref 275–295)
POTASSIUM SERPL-SCNC: 3.8 MMOL/L (ref 3.5–5.1)
PROTHROMBIN TIME: 24.3 SECONDS (ref 11.6–14.8)
SODIUM SERPL-SCNC: 138 MMOL/L (ref 136–145)

## 2022-07-19 PROCEDURE — 80048 BASIC METABOLIC PNL TOTAL CA: CPT

## 2022-07-19 PROCEDURE — 36415 COLL VENOUS BLD VENIPUNCTURE: CPT

## 2022-07-19 PROCEDURE — 99213 OFFICE O/P EST LOW 20 MIN: CPT | Performed by: FAMILY MEDICINE

## 2022-07-19 PROCEDURE — 85610 PROTHROMBIN TIME: CPT

## 2022-07-19 RX ORDER — FUROSEMIDE 40 MG/1
40 TABLET ORAL 2 TIMES DAILY
Refills: 0 | COMMUNITY
Start: 2022-07-19

## 2022-07-19 NOTE — PROGRESS NOTES
Reviewed:  Discharge Summaries  - documented in this encounter    Jhon Osborne., MD - 07/10/2022 1:51 PM CDT  Formatting of this note is different from the original.  Physician Discharge Summary    Admit date: 7/8/2022    Discharge date: 7/10/2022    Primary care provider: Salas Haney Discharge Physician: Jhon Hinson. MD [place]    Final Diagnoses:  1. Iatrogenic hypokalemia    Consultations:  Cardiology    Operations/Procedures:  None    HPI & Hospital Course:     HPI by Ingrid: \"History of present illness: Patient is 81-year-old white female with a past medical history of chronic atrial fibrillation, chronic diastolic heart failure, coronary artery disease referred to the emergency room for evaluation of her low potassium. Patient was started on metolazone in the recent past for fluid overload secondary to heart failure. Follow-up blood work was done. Patient noted to have a potassium of 2.4 and troponin of 299. Patient referred to the emergency room for further evaluation. As per patient, her leg swelling is improving from metolazone use. Patient denies of any chest pain or shortness of breath. She had a issue with the leg cramping yesterday and day before yesterday. She does not have leg cramping anymore. She was feeling dizzy earlier today. Now patient is feeling better. Patient received aspirin, oral potassium and getting IV potassium supplement. Decision was made to admit patient to the hospital for further evaluation. I reviewed patient's previous medical record. \"    Hypokalemia secondary to aggressive diuretics. Potassium replaced with improvement of potassium 2.3-3.4. Metolazone discontinued, patient to continue only with oral Lasix 40 mg twice daily and potassium 20 M EQs p.o. daily. Repeat BMP July 15, 2022 outpatient. Follow-up with cardiology in 2 weeks.     Disposition: to home in stable and improved condition     Patient Instructions:     Discharge Meds  Current Discharge Medication List     CONTINUE these medications which have NOT CHANGED   Details   albuterol 90 mcg/actuation inhaler inhale 2 puffs into the lungs every 4 (four) hours as needed for wheezing. aspirin 81 mg tablet,delayed release (DR/EC) Take 81 mg by mouth daily. citalopram 10 mg tablet Take 10 mg by mouth daily. diphenhydrAMINE tab 25 mg tablet Take 25 mg by mouth every 6 (six) hours as needed for sleeplessness or itching. ferrous sulfate 324 mg (65 mg iron) EC tablet Take 324 mg by mouth daily. fluticasone propion-salmeteroL (ADVAIR DISKUS) 250-50 mcg/dose inhaler inhale 1 puff into the lungs every 12 (twelve) hours. Qty: 60 each, Refills: 2   Associated Diagnoses: Moderate persistent asthma without complication     furosemide 40 mg tablet Take 1 tablet by mouth 2 (two) times daily. Qty: 180 tablet, Refills: 3     hydrocortisone 2.5 % lotion Apply 1 Application topically 2 (two) times daily as needed. Affected areas     ipratropium-albuteroL 0.5 mg-3 mg(2.5 mg base)/3 mL nebulizer solution Take 3 mLs by nebulization 2 (two) times daily. mecobalamin, vitamin B12, (B12 ACTIVE) 1,000 mcg tablet,chewable Take 1 tablet by mouth daily. !! metoprolol succinate 100 mg XL tablet Take 1 tablet by mouth daily. Qty: 90 tablet, Refills: 3   Associated Diagnoses: Permanent atrial fibrillation (CMS-HCC)     ! ! metoprolol succinate 50 mg XL tablet Take 1 tablet by mouth nightly. Qty: 90 tablet, Refills: 3   Associated Diagnoses: Permanent atrial fibrillation (CMS-HCC)     ! ! misc medical supply Misc Compression socks BLE knee high 20-30mmHG  Qty: 1 each, Refills: 0     !! misc medical supply Misc Blood pressure cuff    Check blood pressure daily 1-2 hours after medications and keep a log  Qty: 1 each, Refills: 0     POTASSIUM CHLORIDE 20 mEq dispersable ER tab TAKE 1 TABLET BY MOUTH DAILY. Qty: 90 tablet, Refills: 3     warfarin 1 mg tablet Take 7 mg by mouth every evening before dinner.      !! - Potential duplicate medications found. Please discuss with provider. STOP taking these medications     metOLazone 2.5 mg tablet         Physical examination  General appearance: alert, cooperative, oriented, no distress, appears stated age  Lungs: clear to auscultation bilaterally  Heart: regular rate and rhythm, S1, S2 normal, no murmur, click, rub or gallop  Abdomen: soft, non-tender. Bowel sounds normal.   Extremities: extremities normal, atraumatic, no cyanosis or edema    Follow-up    Cardiology    Discharge instructions reviewed. Patient agrees and understands. All questions answered. Total time spent: 25    Masha Rascon MD      Electronically signed by Meryle Oddi., MD at 07/10/2022 1:57 PM CDT

## 2022-07-20 ENCOUNTER — ANTI-COAG VISIT (OUTPATIENT)
Dept: FAMILY MEDICINE CLINIC | Facility: CLINIC | Age: 82
End: 2022-07-20

## 2022-07-20 DIAGNOSIS — Z79.01 LONG TERM (CURRENT) USE OF ANTICOAGULANTS: ICD-10-CM

## 2022-07-20 DIAGNOSIS — I48.21 PERMANENT ATRIAL FIBRILLATION (HCC): ICD-10-CM

## 2022-07-20 NOTE — PROGRESS NOTES
Pt informed. Pt states she is  managed by cardiology. Pt states she is taking 7 mg of Coumadin per day  Pt states she will call back with fax number to where report will need to be sent.

## 2022-07-21 ENCOUNTER — PATIENT OUTREACH (OUTPATIENT)
Dept: FAMILY MEDICINE CLINIC | Facility: CLINIC | Age: 82
End: 2022-07-21

## 2022-07-25 NOTE — PROGRESS NOTES
Pt states the coumadin clinic at [facility] did get her information. Pt states this is taken care of.

## 2022-07-26 RX ORDER — IPRATROPIUM BROMIDE AND ALBUTEROL SULFATE 2.5; .5 MG/3ML; MG/3ML
SOLUTION RESPIRATORY (INHALATION)
Qty: 180 ML | Refills: 0 | Status: SHIPPED | OUTPATIENT
Start: 2022-07-26

## 2022-07-26 NOTE — TELEPHONE ENCOUNTER
Future appt: Your appointments     Date & Time Appointment Department [facility])    Jan 06, 2023 11:00 AM CST Sleep Follow Up with [address] [facility] [address], [address], [facility] [place] ([place])            [phone]  [phone]        Last Appointment with provider:   7/19/2022  Last appointment at [facility] [place]:  7/19/2022  Last px: 12/6/21-  Pt has hx: COPD    Ipratropium-Albuterol refilled 6/22/22 for #180, 0 refills        Cholesterol, Total (mg/dL)   Date Value   01/18/2022 145     HDL Cholesterol (mg/dL)   Date Value   01/18/2022 40     LDL Cholesterol (mg/dL)   Date Value   01/18/2022 91     Triglycerides (mg/dL)   Date Value   01/18/2022 72     No results found for: EAG, A1C  Lab Results   Component Value Date    T4F 1.1 04/06/2021    TSH 3.050 04/06/2021       No follow-ups on file.

## 2022-08-08 ENCOUNTER — TELEPHONE (OUTPATIENT)
Dept: FAMILY MEDICINE CLINIC | Facility: CLINIC | Age: 82
End: 2022-08-08

## 2022-08-08 RX ORDER — CITALOPRAM 10 MG/1
TABLET ORAL
Qty: 63 TABLET | Refills: 0 | Status: SHIPPED | OUTPATIENT
Start: 2022-08-08

## 2022-08-08 NOTE — TELEPHONE ENCOUNTER
Pt is currently admitted at [facility] and planning for discharge today. pt was admitted due to decompensating CHF. Emiliano Escobar informed pt has appt with PCP this week. Will plan to f/u with pt at that time.       Future Appointments   Date Time Provider Cora Palafox   8/12/2022 10:00 AM Rodrigo Hearn MD [facility] [place] [facility] [place]   1/6/2023 11:00 AM Taco Clark APRN [facility] [place] [facility] [place]

## 2022-08-08 NOTE — TELEPHONE ENCOUNTER
Future appt: Your appointments     Date & Time Appointment Department [facility])    Aug 12, 2022 10:00 AM CDT Hospital Follow Up with Xavier Harris MD 25 [facility] [place])        Jan 06, 2023 11:00 AM CST Sleep Follow Up with [address] [facility] [address], [address] [place] [address], [address] [facility])            25 [facility] [place]  [facility] [phone]  [phone]          Last Appointment with provider:   7/19/2022 - A-fib  Last appointment at [facility] [place]:  7/19/2022    Last refill - #63 w/0 refills    Last px - 12/6/21    Cholesterol, Total (mg/dL)   Date Value   01/18/2022 145     HDL Cholesterol (mg/dL)   Date Value   01/18/2022 40     LDL Cholesterol (mg/dL)   Date Value   01/18/2022 91     Triglycerides (mg/dL)   Date Value   01/18/2022 72     No results found for: EAG, A1C  Lab Results   Component Value Date    T4F 1.1 04/06/2021    TSH 3.050 04/06/2021       No follow-ups on file.

## 2022-08-30 RX ORDER — CITALOPRAM 10 MG/1
TABLET ORAL
Qty: 90 TABLET | Refills: 0 | Status: SHIPPED | OUTPATIENT
Start: 2022-08-30

## 2022-08-30 NOTE — TELEPHONE ENCOUNTER
RF given 8/8/2022 #63 to sync file. Now this request is asking for a full 90 day supply. Please advise. Future appt: Your appointments     Date & Time Appointment Department [facility])    Jan 06, 2023 11:00 AM CST Sleep Follow Up with 55 [facility] [address], [address] [facility], [address] [place] [address])            25 [facility] [place]  [phone]  [phone]        Last Appointment with provider:   7/19/2022    Last appointment at [facility] [place]:  7/19/2022  Cholesterol, Total (mg/dL)   Date Value   01/18/2022 145     HDL Cholesterol (mg/dL)   Date Value   01/18/2022 40     LDL Cholesterol (mg/dL)   Date Value   01/18/2022 91     Triglycerides (mg/dL)   Date Value   01/18/2022 72     No results found for: EAG, A1C  Lab Results   Component Value Date    T4F 1.1 04/06/2021    TSH 3.050 04/06/2021       No follow-ups on file.

## 2022-08-31 ENCOUNTER — TELEPHONE (OUTPATIENT)
Dept: FAMILY MEDICINE CLINIC | Facility: CLINIC | Age: 82
End: 2022-08-31

## 2022-08-31 NOTE — TELEPHONE ENCOUNTER
Pt was admitted and hospitalized at [facility] on 7/31 for heart failure and pt was admitted again on 8/15 to [place].  Pt was discharge home on 8/26. Pt states she already scheduled a post hospital appt on 9/16/22. Pt states she was told to ask for orders for PT and OT. Also pt states she is not able to sleep well at night. Pt states this was an issue before going into the hospital.  Pt states her \"mind races\". While admitted, pt states she was given Xanax at night to help. Pt asked if CR would be willing to given a prescription. Pt informed she will need to be seen to review and get evaluated for any new orders. Pt verbalized understanding. To await appt.       Future Appointments   Date Time Provider Cora Palafox   9/16/2022 11:30 AM Carolynn Colon MD [facility] [place]   1/6/2023 11:00 AM Michelle Clark APRN [facility] [place]

## 2022-08-31 NOTE — TELEPHONE ENCOUNTER
Discussed with CR- pt scheduled for sooner post hospital visit.   Future Appointments   Date Time Provider Cora Vivienne   9/9/2022  2:45 PM Delphine Verdin MD [facility] [place]   1/6/2023 11:00 AM Shellie Clark APRN [facility] [place]

## 2022-08-31 NOTE — TELEPHONE ENCOUNTER
referral needed for occupational & physical therapy, also looking to get a sleeping pill BEFORE Groton Ball

## 2022-09-02 ENCOUNTER — TELEPHONE (OUTPATIENT)
Dept: FAMILY MEDICINE CLINIC | Facility: CLINIC | Age: 82
End: 2022-09-02

## 2022-09-02 NOTE — TELEPHONE ENCOUNTER
Pt was exposed to Covid on Monday and wanted to know if she should be tested? Pt stated she is a little nauseous but no other sx. Pt has hx of sob/trouble breathing, nothing new or worse for her. Pt asking for order for Covid testing be sent to [facility] [address].

## 2022-09-09 ENCOUNTER — OFFICE VISIT (OUTPATIENT)
Dept: FAMILY MEDICINE CLINIC | Facility: CLINIC | Age: 82
End: 2022-09-09
Payer: MEDICARE

## 2022-09-09 VITALS
TEMPERATURE: 99 F | BODY MASS INDEX: 39.59 KG/M2 | HEART RATE: 76 BPM | DIASTOLIC BLOOD PRESSURE: 56 MMHG | SYSTOLIC BLOOD PRESSURE: 118 MMHG | OXYGEN SATURATION: 95 % | WEIGHT: 201.63 LBS | HEIGHT: 60 IN | RESPIRATION RATE: 20 BRPM

## 2022-09-09 DIAGNOSIS — R54 FRAIL ELDERLY: ICD-10-CM

## 2022-09-09 DIAGNOSIS — I48.21 PERMANENT ATRIAL FIBRILLATION (HCC): ICD-10-CM

## 2022-09-09 DIAGNOSIS — I50.33 ACUTE ON CHRONIC DIASTOLIC HEART FAILURE (HCC): ICD-10-CM

## 2022-09-09 DIAGNOSIS — I10 ESSENTIAL HYPERTENSION, BENIGN: ICD-10-CM

## 2022-09-09 DIAGNOSIS — I50.32 CHRONIC DIASTOLIC CONGESTIVE HEART FAILURE (HCC): Primary | ICD-10-CM

## 2022-09-09 DIAGNOSIS — J44.9 CHRONIC OBSTRUCTIVE PULMONARY DISEASE, UNSPECIFIED COPD TYPE (HCC): ICD-10-CM

## 2022-09-09 PROBLEM — H25.12 AGE-RELATED NUCLEAR CATARACT OF LEFT EYE: Status: ACTIVE | Noted: 2022-04-06

## 2022-09-09 PROCEDURE — 1111F DSCHRG MED/CURRENT MED MERGE: CPT | Performed by: FAMILY MEDICINE

## 2022-09-09 PROCEDURE — 99215 OFFICE O/P EST HI 40 MIN: CPT | Performed by: FAMILY MEDICINE

## 2022-09-09 RX ORDER — BENZONATATE 100 MG/1
1 CAPSULE ORAL 3 TIMES DAILY PRN
COMMUNITY
Start: 2022-08-15

## 2022-09-09 RX ORDER — POLYETHYLENE GLYCOL 3350 17 G/17G
17 POWDER, FOR SOLUTION ORAL
COMMUNITY
Start: 2022-08-15

## 2022-09-09 RX ORDER — BUMETANIDE 1 MG/1
1 TABLET ORAL 2 TIMES DAILY
COMMUNITY
Start: 2022-08-26

## 2022-09-09 RX ORDER — SPIRONOLACTONE 25 MG/1
25 TABLET ORAL DAILY
COMMUNITY
Start: 2022-08-30

## 2022-09-09 RX ORDER — NYSTATIN 100000 [USP'U]/G
1 POWDER TOPICAL 2 TIMES DAILY
COMMUNITY
Start: 2022-08-26

## 2022-09-09 RX ORDER — MAGNESIUM OXIDE 400 MG/1
400 TABLET ORAL DAILY
COMMUNITY
Start: 2022-08-27

## 2022-09-09 NOTE — PATIENT INSTRUCTIONS
rec PT eval and treat for general strengthing    rec continue eds    Continue cardiolog and pulmonary    rec flu vaccine    Consider covid vacine-- 4 weeks after flu vaccine    Recheck 4-6 months

## 2022-10-10 ENCOUNTER — OFFICE VISIT (OUTPATIENT)
Dept: FAMILY MEDICINE CLINIC | Facility: CLINIC | Age: 82
End: 2022-10-10
Payer: MEDICARE

## 2022-10-10 VITALS
TEMPERATURE: 97 F | HEART RATE: 53 BPM | OXYGEN SATURATION: 97 % | WEIGHT: 201.69 LBS | BODY MASS INDEX: 40.66 KG/M2 | DIASTOLIC BLOOD PRESSURE: 64 MMHG | RESPIRATION RATE: 20 BRPM | HEIGHT: 59 IN | SYSTOLIC BLOOD PRESSURE: 106 MMHG

## 2022-10-10 DIAGNOSIS — I10 ESSENTIAL HYPERTENSION, BENIGN: ICD-10-CM

## 2022-10-10 DIAGNOSIS — E87.6 HYPOKALEMIA: ICD-10-CM

## 2022-10-10 DIAGNOSIS — R54 FRAIL ELDERLY: ICD-10-CM

## 2022-10-10 DIAGNOSIS — M19.90 ARTHRITIS: ICD-10-CM

## 2022-10-10 DIAGNOSIS — E78.49 FAMILIAL MULTIPLE LIPOPROTEIN-TYPE HYPERLIPIDEMIA: ICD-10-CM

## 2022-10-10 DIAGNOSIS — I25.10 ATHEROSCLEROSIS OF NATIVE CORONARY ARTERY OF NATIVE HEART WITHOUT ANGINA PECTORIS: ICD-10-CM

## 2022-10-10 DIAGNOSIS — M15.9 PRIMARY OSTEOARTHRITIS INVOLVING MULTIPLE JOINTS: ICD-10-CM

## 2022-10-10 DIAGNOSIS — J44.9 CHRONIC OBSTRUCTIVE PULMONARY DISEASE, UNSPECIFIED COPD TYPE (HCC): ICD-10-CM

## 2022-10-10 DIAGNOSIS — F41.9 ANXIETY: ICD-10-CM

## 2022-10-10 DIAGNOSIS — I50.32 CHRONIC HEART FAILURE WITH PRESERVED EJECTION FRACTION (HCC): Primary | ICD-10-CM

## 2022-10-10 PROCEDURE — 99214 OFFICE O/P EST MOD 30 MIN: CPT | Performed by: FAMILY MEDICINE

## 2022-10-10 RX ORDER — FLUTICASONE FUROATE AND VILANTEROL 200; 25 UG/1; UG/1
1 POWDER RESPIRATORY (INHALATION) DAILY
COMMUNITY
Start: 2022-09-12

## 2022-10-10 NOTE — PATIENT INSTRUCTIONS
Plan for labs-- next week     continue cardiology care Dr. Ruth nAn Murry--pulmonary    Continue medication    Continue PT and start OT    rec covid update vaccine    Continue celexa for moods    Recheck 3  Months- soon if issues

## 2023-01-04 NOTE — PATIENT INSTRUCTIONS
DEXA 1/25/22 showed osteopenia.   - Check vitamin D  - Continue calcium/vitamin D supplement  - Encouraged weight bearing/strength training exercises   rec trial of inhaler with chamber    Pt to consider counceling-- Chao Noguera    Continue pulmonary and cardiac evaluations    rec daily activity and plan    Continue present medications

## 2023-01-06 ENCOUNTER — TELEPHONE (OUTPATIENT)
Dept: FAMILY MEDICINE CLINIC | Facility: CLINIC | Age: 83
End: 2023-01-06

## 2023-01-11 ENCOUNTER — OFFICE VISIT (OUTPATIENT)
Dept: FAMILY MEDICINE CLINIC | Facility: CLINIC | Age: 83
End: 2023-01-11
Payer: MEDICARE

## 2023-01-11 VITALS
WEIGHT: 211.13 LBS | TEMPERATURE: 98 F | SYSTOLIC BLOOD PRESSURE: 118 MMHG | HEIGHT: 59.5 IN | OXYGEN SATURATION: 95 % | HEART RATE: 84 BPM | BODY MASS INDEX: 42 KG/M2 | DIASTOLIC BLOOD PRESSURE: 60 MMHG | RESPIRATION RATE: 20 BRPM

## 2023-01-11 DIAGNOSIS — E78.49 FAMILIAL MULTIPLE LIPOPROTEIN-TYPE HYPERLIPIDEMIA: ICD-10-CM

## 2023-01-11 DIAGNOSIS — Z86.73 HX OF TRANSIENT ISCHEMIC ATTACK (TIA): ICD-10-CM

## 2023-01-11 DIAGNOSIS — J45.40 MODERATE PERSISTENT ASTHMA WITHOUT COMPLICATION: ICD-10-CM

## 2023-01-11 DIAGNOSIS — I50.32 CHRONIC HEART FAILURE WITH PRESERVED EJECTION FRACTION (HCC): ICD-10-CM

## 2023-01-11 DIAGNOSIS — I10 ESSENTIAL HYPERTENSION, BENIGN: ICD-10-CM

## 2023-01-11 DIAGNOSIS — E66.01 CLASS 3 SEVERE OBESITY DUE TO EXCESS CALORIES WITH SERIOUS COMORBIDITY AND BODY MASS INDEX (BMI) OF 40.0 TO 44.9 IN ADULT (HCC): ICD-10-CM

## 2023-01-11 DIAGNOSIS — G47.61 PERIODIC LIMB MOVEMENT DISORDER: ICD-10-CM

## 2023-01-11 DIAGNOSIS — M15.9 PRIMARY OSTEOARTHRITIS INVOLVING MULTIPLE JOINTS: ICD-10-CM

## 2023-01-11 DIAGNOSIS — K21.9 GASTROESOPHAGEAL REFLUX DISEASE WITHOUT ESOPHAGITIS: ICD-10-CM

## 2023-01-11 DIAGNOSIS — E87.6 HYPOKALEMIA: ICD-10-CM

## 2023-01-11 DIAGNOSIS — Z86.010 HX OF COLONIC POLYP: ICD-10-CM

## 2023-01-11 DIAGNOSIS — M48.061 SPINAL STENOSIS OF LUMBAR REGION, UNSPECIFIED WHETHER NEUROGENIC CLAUDICATION PRESENT: ICD-10-CM

## 2023-01-11 DIAGNOSIS — G47.33 OBSTRUCTIVE SLEEP APNEA: ICD-10-CM

## 2023-01-11 DIAGNOSIS — I48.21 PERMANENT ATRIAL FIBRILLATION (HCC): ICD-10-CM

## 2023-01-11 DIAGNOSIS — R54 FRAIL ELDERLY: ICD-10-CM

## 2023-01-11 DIAGNOSIS — Z00.00 ENCOUNTER FOR ANNUAL HEALTH EXAMINATION: Primary | ICD-10-CM

## 2023-01-11 DIAGNOSIS — F41.9 ANXIETY: ICD-10-CM

## 2023-01-11 DIAGNOSIS — I25.10 ATHEROSCLEROSIS OF NATIVE CORONARY ARTERY OF NATIVE HEART WITHOUT ANGINA PECTORIS: ICD-10-CM

## 2023-01-11 DIAGNOSIS — I27.20 PULMONARY HYPERTENSION (HCC): ICD-10-CM

## 2023-01-11 DIAGNOSIS — J44.9 CHRONIC OBSTRUCTIVE PULMONARY DISEASE, UNSPECIFIED COPD TYPE (HCC): ICD-10-CM

## 2023-01-11 PROBLEM — R06.00 DYSPNEA: Status: RESOLVED | Noted: 2020-10-09 | Resolved: 2023-01-11

## 2023-01-11 PROBLEM — M62.830 BACK MUSCLE SPASM: Status: RESOLVED | Noted: 2021-05-07 | Resolved: 2023-01-11

## 2023-01-11 PROBLEM — J45.909 ASTHMA: Status: RESOLVED | Noted: 2020-10-20 | Resolved: 2023-01-11

## 2023-01-11 PROBLEM — R19.5 FECAL OCCULT BLOOD TEST POSITIVE: Status: RESOLVED | Noted: 2021-05-19 | Resolved: 2023-01-11

## 2023-01-11 PROBLEM — R06.02 SHORTNESS OF BREATH: Status: RESOLVED | Noted: 2020-10-09 | Resolved: 2023-01-11

## 2023-01-11 PROBLEM — G47.9 SLEEP DISTURBANCE: Status: RESOLVED | Noted: 2018-07-14 | Resolved: 2023-01-11

## 2023-01-11 PROBLEM — R91.1 NODULE OF LOWER LOBE OF LEFT LUNG: Status: RESOLVED | Noted: 2020-10-20 | Resolved: 2023-01-11

## 2023-01-11 PROBLEM — H34.8190 CENTRAL RETINAL VEIN OCCLUSION WITH MACULAR EDEMA: Status: ACTIVE | Noted: 2022-06-17

## 2023-01-11 PROBLEM — I34.0 NONRHEUMATIC MITRAL VALVE REGURGITATION: Status: RESOLVED | Noted: 2022-04-14 | Resolved: 2023-01-11

## 2023-01-11 PROCEDURE — 1126F AMNT PAIN NOTED NONE PRSNT: CPT | Performed by: FAMILY MEDICINE

## 2023-01-11 PROCEDURE — 99213 OFFICE O/P EST LOW 20 MIN: CPT | Performed by: FAMILY MEDICINE

## 2023-01-11 PROCEDURE — G0439 PPPS, SUBSEQ VISIT: HCPCS | Performed by: FAMILY MEDICINE

## 2023-01-11 RX ORDER — HYDROCORTISONE 25 MG/ML
LOTION TOPICAL AS NEEDED
COMMUNITY

## 2023-01-11 RX ORDER — ALBUTEROL SULFATE 90 UG/1
2 AEROSOL, METERED RESPIRATORY (INHALATION) EVERY 4 HOURS PRN
COMMUNITY

## 2023-01-11 NOTE — PATIENT INSTRUCTIONS
Rec covid booster vaccine    Rec shingle vaccine- at pharmacy    Mamogram due February    Continue cardiac care    Continue medications    Encourage use of steroid inhaler daily    Rec fasting labs-- [facility] per patient        Lala [address]ush[address]   Tests on this list are recommended by your physician but may not be covered, or covered at this frequency, by your insurer. Please check with your insurance carrier before scheduling to verify coverage.    PREVENTATIVE SERVICES FREQUENCY &  COVERAGE DETAILS LAST COMPLETION DATE   Diabetes Screening    Fasting Blood Sugar /  Glucose    One screening every 12 months if never tested or if previously tested but not diagnosed with pre-diabetes   One screening every 6 months if diagnosed with pre-diabetes Lab Results   Component Value Date     (H) 07/19/2022        Cardiovascular Disease Screening    Lipid Panel  Cholesterol  Lipoprotein (HDL)  Triglycerides Covered every 5 years for all Medicare beneficiaries without apparent signs or symptoms of cardiovascular disease Lab Results   Component Value Date    CHOLEST 145 01/18/2022    HDL 40 01/18/2022    LDL 91 01/18/2022    TRIG 72 01/18/2022         Electrocardiogram (EKG)   Covered if needed at Welcome to Medicare, and non-screening if indicated for medical reasons 02/20/2018      Ultrasound Screening for Abdominal Aortic Aneurysm (AAA) Covered once in a lifetime for one of the following risk factors    Men who are 73-68 years old and have ever smoked    Anyone with a family history -     Colorectal Cancer Screening  Covered for ages 52-80; only need ONE of the following:    Colonoscopy   Covered every 10 years    Covered every 2 years if patient is at high risk or previous colonoscopy was abnormal -    No recommendations at this time    Flexible Sigmoidoscopy   Covered every 4 years -    Fecal Occult Blood Test Covered annually -   Bone Density Screening    Bone density screening    Covered every 2 years after age 72 if diagnosed with risk of osteoporosis or estrogen deficiency. Covered yearly for long-term glucocorticoid medication use (Steroids) Last Dexa Scan:    XR DEXA BONE DENSITOMETRY (CPT=77080) 07/21/2017      No recommendations at this time   Pap and Pelvic    Pap   Covered every 2 years for women at normal risk;  Annually if at high risk -  No recommendations at this time    Chlamydia Annually if high risk -  No recommendations at this time   Screening Mammogram    Mammogram     Recommend annually for all female patients aged 36 and older    One baseline mammogram covered for patients aged 32-38 02/01/2022    Mammogram due on 02/01/2023    Immunizations    Influenza Covered once per flu season  Please get every year 09/16/2022  No recommendations at this time    Pneumococcal Each vaccine ([id number] & Ayhmbbigp84) covered once after 65 Prevnar 13: 09/02/2016    Zwutcpzdz12: 09/16/2020     No recommendations at this time    Hepatitis B One screening covered for patients with certain risk factors   -  No recommendations at this time    Tetanus Toxoid Not covered by Medicare Part B unless medically necessary (cut with metal); may be covered with your pharmacy prescription benefits -    Tetanus, Diptheria and Pertusis TD and TDaP Not covered by Medicare Part B -  No recommendations at this time    Zoster Not covered by Medicare Part B; may be covered with your pharmacy  prescription benefits -  Zoster Vaccines(1 of 2) Never done       Annual Monitoring of Persistent Medications (ACE/ARB, digoxin diuretics, anticonvulsants)    Potassium Annually Lab Results   Component Value Date    K 3.8 07/19/2022         Creatinine   Annually Lab Results   Component Value Date    CREATSERUM 0.88 07/19/2022         BUN Annually Lab Results   Component Value Date    BUN 17 07/19/2022       Drug Serum Conc Annually No results found for: DIGOXIN, DIG, VALP           Chronic Obstructive Pulmonary Disease (COPD) Spirometry Annually Spirometry date:

## 2023-01-12 NOTE — MR AVS SNAPSHOT
[address]  [address] [address] [id number]  [phone]               Thank you for choosing us for your health care visit with ZAHRAA Boyer.   We are glad to serve you and happy to provide you with this summary of yo Commonly known as:  PRINIVIL,ZESTRIL           LOTRISONE 1-0.05 % Crea   Generic drug:  clotrimazole-betamethasone   Apply topically. Apply to affected are tid as needed           Lovastatin 20 MG Tabs   Take 20 mg by mouth daily.            methylPREDNISol normal... Well appearing, awake, alert, oriented to person, place, time/situation and in no apparent distress.

## 2023-01-13 LAB
ALBUMIN/GLOBULIN RATIO: 1.1
ALBUMIN: 4 G/DL
ALKALINE PHOSPHATASE: 65
ALT: 13
ANION GAP: 9
AST: 23
BILIRUBIN, TOTAL: 1.1 MG/DL
BUN/CREATININE RATIO: 24.2
BUN: 27
CALCIUM: 9.8
CARBON DIOXIDE: 28
CHLORIDE: 102
CREATININE: 1.11 MG/DL (ref 0.6–1.2)
GFR NON-AFRICAN AMERICAN: 50
GLOBULIN: 3.5
GLUCOSE URINE: NORMAL
GLUCOSE: 109
HCT: 41.5
HDL CHOLESTEROL: 38 MG/DL
HGB: 13.2
LDL CHOLESTEROL: 96 MG/DL (ref ?–130)
MEAN CELL VOLUME: 94.5
MEAN CORPUSCULAR HEMOGLOBIN: 30.1
MEAN CORPUSCULAR HGB CONC: 31.8
MEAN PLATELET VOLUME: 9.3
NON HDL CHOL: 115
PLT: 215
POTASSIUM: 3.8
PROTEIN, URINE: 10
RED BLOOD COUNT: 4.39
RED CELL DISTRIBUTION WIDTH: 12.6
SODIUM: 138
SPECIFIC GRAVITY: 1.02
TOTAL CHOL/HDL RATIO: 4 RATIO (ref 0–4.5)
TOTAL CHOLESTEROL: 153 MG/DL (ref ?–200)
TOTAL PROTEIN: 7.5
TRIGLYCERIDES: 94 MG/DL
TSH: 4.22 UIU/ML
URINE BILIRUBIN: NEGATIVE
URINE CLARITY: CLEAR
URINE KETONES: NEGATIVE MG/DL
URINE NITRITE: NEGATIVE
URINE PH: 5.5
URINE-COLOR: YELLOW
UROBILINOGEN: NORMAL
WBC: 8.2

## 2023-01-16 ENCOUNTER — TELEPHONE (OUTPATIENT)
Dept: FAMILY MEDICINE CLINIC | Facility: CLINIC | Age: 83
End: 2023-01-16

## 2023-01-16 NOTE — TELEPHONE ENCOUNTER
Urine culture collected at [facility] on 1/13/23. Result:  \"culture result (>=3 organisms present) indicates possible contamination. Repeat culture if symptoms indicate.   Reviewed with CR-  No treatment indicated per MD.    LM for pt

## 2023-02-21 ENCOUNTER — TELEPHONE (OUTPATIENT)
Dept: FAMILY MEDICINE CLINIC | Facility: CLINIC | Age: 83
End: 2023-02-21

## 2023-02-23 ENCOUNTER — TELEPHONE (OUTPATIENT)
Dept: FAMILY MEDICINE CLINIC | Facility: CLINIC | Age: 83
End: 2023-02-23

## 2023-02-27 NOTE — TELEPHONE ENCOUNTER
Spoke with pt. Pt has scheduled appt   for mammogram next on 5/22/23  And pt also has appt with Dr. Manpreet Farrell. on 5/30/23.

## 2023-04-07 RX ORDER — CITALOPRAM 10 MG/1
TABLET ORAL
Qty: 90 TABLET | Refills: 0 | Status: SHIPPED | OUTPATIENT
Start: 2023-04-07

## 2023-04-07 NOTE — TELEPHONE ENCOUNTER
Last refill: 8/30/2022 #90 with 0 refills  Last px: 1/11/2023       Future appt:    Last Appointment with provider:   1/11/2023  Last appointment at [facility]:  1/11/2023  Total Cholesterol (mg/dL)   Date Value   01/13/2023 153     HDL Cholesterol (mg/dL)   Date Value   01/13/2023 38     LDL Cholesterol (mg/dL)   Date Value   01/13/2023 96     Triglycerides (mg/dL)   Date Value   01/13/2023 94   01/18/2022 72     No results found for: EAG, A1C  Lab Results   Component Value Date    T4F 1.1 04/06/2021    TSH 4.22 01/13/2023       No follow-ups on file.

## 2023-04-21 ENCOUNTER — TELEPHONE (OUTPATIENT)
Dept: FAMILY MEDICINE CLINIC | Facility: CLINIC | Age: 83
End: 2023-04-21

## 2023-04-21 NOTE — TELEPHONE ENCOUNTER
Spoke with pt. Pt reports having a rectal bleed for 3 days. Pt is on Coumadin- states she works with [place] [facility], states she is due for INR at this time. Pt mentioned having recent dose change. Pt reports stools are dark - pt also on iron. Discussed with CR- pt urged to go to ER at this time. Pt verbalized understanding.

## 2023-04-21 NOTE — TELEPHONE ENCOUNTER
Past 3 days has notices her stools have a weird look to them and there is also some blood drips in between.

## 2023-05-02 ENCOUNTER — OFFICE VISIT (OUTPATIENT)
Dept: FAMILY MEDICINE CLINIC | Facility: CLINIC | Age: 83
End: 2023-05-02
Payer: MEDICARE

## 2023-05-02 ENCOUNTER — LAB ENCOUNTER (OUTPATIENT)
Dept: LAB | Age: 83
End: 2023-05-02
Attending: FAMILY MEDICINE
Payer: MEDICARE

## 2023-05-02 VITALS
HEIGHT: 59 IN | BODY MASS INDEX: 45.16 KG/M2 | SYSTOLIC BLOOD PRESSURE: 132 MMHG | RESPIRATION RATE: 24 BRPM | OXYGEN SATURATION: 97 % | WEIGHT: 224 LBS | DIASTOLIC BLOOD PRESSURE: 78 MMHG | HEART RATE: 71 BPM | TEMPERATURE: 98 F

## 2023-05-02 DIAGNOSIS — I10 ESSENTIAL HYPERTENSION, BENIGN: ICD-10-CM

## 2023-05-02 DIAGNOSIS — R54 FRAIL ELDERLY: ICD-10-CM

## 2023-05-02 DIAGNOSIS — K21.9 GASTROESOPHAGEAL REFLUX DISEASE WITHOUT ESOPHAGITIS: ICD-10-CM

## 2023-05-02 DIAGNOSIS — D64.9 ANEMIA, UNSPECIFIED TYPE: ICD-10-CM

## 2023-05-02 DIAGNOSIS — I50.32 CHRONIC HEART FAILURE WITH PRESERVED EJECTION FRACTION (HCC): ICD-10-CM

## 2023-05-02 DIAGNOSIS — D64.9 ANEMIA, UNSPECIFIED TYPE: Primary | ICD-10-CM

## 2023-05-02 DIAGNOSIS — K92.2 LOWER GI BLEED: ICD-10-CM

## 2023-05-02 DIAGNOSIS — R21 RASH: ICD-10-CM

## 2023-05-02 DIAGNOSIS — L29.9 PRURITUS: ICD-10-CM

## 2023-05-02 DIAGNOSIS — T14.8XXA BRUISING: ICD-10-CM

## 2023-05-02 PROBLEM — N63.20 BREAST MASS, LEFT: Status: RESOLVED | Noted: 2020-09-24 | Resolved: 2023-05-02

## 2023-05-02 PROBLEM — L03.115 CELLULITIS OF RIGHT LOWER EXTREMITY: Status: RESOLVED | Noted: 2020-09-24 | Resolved: 2023-05-02

## 2023-05-02 LAB
BASOPHILS # BLD AUTO: 0.06 X10(3) UL (ref 0–0.2)
BASOPHILS NFR BLD AUTO: 0.7 %
EOSINOPHIL # BLD AUTO: 0.67 X10(3) UL (ref 0–0.7)
EOSINOPHIL NFR BLD AUTO: 7.4 %
ERYTHROCYTE [DISTWIDTH] IN BLOOD BY AUTOMATED COUNT: 13.4 %
HCT VFR BLD AUTO: 34.5 %
HGB BLD-MCNC: 11 G/DL
IMM GRANULOCYTES # BLD AUTO: 0.03 X10(3) UL (ref 0–1)
IMM GRANULOCYTES NFR BLD: 0.3 %
LYMPHOCYTES # BLD AUTO: 1.06 X10(3) UL (ref 1–4)
LYMPHOCYTES NFR BLD AUTO: 11.7 %
MCH RBC QN AUTO: 30.3 PG (ref 26–34)
MCHC RBC AUTO-ENTMCNC: 31.9 G/DL (ref 31–37)
MCV RBC AUTO: 95 FL
MONOCYTES # BLD AUTO: 0.75 X10(3) UL (ref 0.1–1)
MONOCYTES NFR BLD AUTO: 8.3 %
NEUTROPHILS # BLD AUTO: 6.46 X10 (3) UL (ref 1.5–7.7)
NEUTROPHILS # BLD AUTO: 6.46 X10(3) UL (ref 1.5–7.7)
NEUTROPHILS NFR BLD AUTO: 71.6 %
PLATELET # BLD AUTO: 221 10(3)UL (ref 150–450)
RBC # BLD AUTO: 3.63 X10(6)UL
WBC # BLD AUTO: 9 X10(3) UL (ref 4–11)

## 2023-05-02 PROCEDURE — 1111F DSCHRG MED/CURRENT MED MERGE: CPT | Performed by: FAMILY MEDICINE

## 2023-05-02 PROCEDURE — 36415 COLL VENOUS BLD VENIPUNCTURE: CPT

## 2023-05-02 PROCEDURE — 99215 OFFICE O/P EST HI 40 MIN: CPT | Performed by: FAMILY MEDICINE

## 2023-05-02 PROCEDURE — 85025 COMPLETE CBC W/AUTO DIFF WBC: CPT

## 2023-05-02 RX ORDER — HYDROXYZINE HYDROCHLORIDE 25 MG/1
TABLET, FILM COATED ORAL
COMMUNITY
Start: 2023-05-01

## 2023-05-02 RX ORDER — VALSARTAN 40 MG/1
40 TABLET ORAL NIGHTLY
COMMUNITY
Start: 2023-03-31

## 2023-05-02 RX ORDER — POTASSIUM CHLORIDE 20 MEQ/1
20 TABLET, EXTENDED RELEASE ORAL 3 TIMES DAILY
COMMUNITY
Start: 2023-02-17

## 2023-05-02 RX ORDER — PANTOPRAZOLE SODIUM 40 MG/1
40 TABLET, DELAYED RELEASE ORAL
COMMUNITY
Start: 2023-04-26

## 2023-05-02 RX ORDER — PERMETHRIN 50 MG/G
1 CREAM TOPICAL ONCE
Qty: 60 G | Refills: 0 | Status: SHIPPED | OUTPATIENT
Start: 2023-05-02 | End: 2023-05-02

## 2023-05-02 NOTE — PATIENT INSTRUCTIONS
Rec treat mites- rx to [id number] [facility]    Cardiology appt joaquín    Continue medications    Recheck 1 month

## 2023-05-11 ENCOUNTER — OFFICE VISIT (OUTPATIENT)
Dept: FAMILY MEDICINE CLINIC | Facility: CLINIC | Age: 83
End: 2023-05-11
Payer: MEDICARE

## 2023-05-11 VITALS
TEMPERATURE: 97 F | BODY MASS INDEX: 45.28 KG/M2 | HEART RATE: 73 BPM | OXYGEN SATURATION: 98 % | SYSTOLIC BLOOD PRESSURE: 128 MMHG | HEIGHT: 59 IN | RESPIRATION RATE: 20 BRPM | DIASTOLIC BLOOD PRESSURE: 78 MMHG | WEIGHT: 224.63 LBS

## 2023-05-11 DIAGNOSIS — L29.9 PRURITUS: Primary | ICD-10-CM

## 2023-05-11 DIAGNOSIS — R21 RASH: ICD-10-CM

## 2023-05-11 PROCEDURE — 99214 OFFICE O/P EST MOD 30 MIN: CPT | Performed by: NURSE PRACTITIONER

## 2023-05-11 RX ORDER — PERMETHRIN 50 MG/G
CREAM TOPICAL
COMMUNITY
Start: 2023-05-03

## 2023-05-11 RX ORDER — TRIAMCINOLONE ACETONIDE 1 MG/G
CREAM TOPICAL 2 TIMES DAILY PRN
Qty: 80 G | Refills: 0 | Status: SHIPPED | OUTPATIENT
Start: 2023-05-11

## 2023-05-11 NOTE — PATIENT INSTRUCTIONS
Topical triamcinolone twice a day for up to ten days  Aquaphor intermittently during the day  Check with cardiology regarding whether itching (Pruritis) can be secondary to bumex dosing

## 2023-05-23 ENCOUNTER — TELEPHONE (OUTPATIENT)
Dept: FAMILY MEDICINE CLINIC | Facility: CLINIC | Age: 83
End: 2023-05-23

## 2023-05-23 DIAGNOSIS — R92.8 ABNORMAL MAMMOGRAM: Primary | ICD-10-CM

## 2023-05-23 NOTE — PROGRESS NOTES
Orders entered for mammogram and ultrasound in 3 months-please fax to [facility] (printer 12)

## 2023-05-24 NOTE — TELEPHONE ENCOUNTER
Patient informed of the below recommendations. States she called over to [facility] to confirm and was told she didn't need to repeat until 6 months. States she has an appt scheduled for November. Please review. No protocol. No future appointments.     Requested Prescriptions   Pending Prescriptions Disp Refills    NORTRIPTYLINE 10 MG Oral Cap [Pharmacy Med Name: NORTRIPTYLINE 10MG CAPSULES] 180 capsule 1     Sig: TAKE 2 CAPSULES(20 MG) BY MOUTH DAILY        There is no refill protocol information for this order           Recent Outpatient Visits              9 months ago TIA (transient ischemic attack)    [facility], [address] [place] [address], Evelyn Steele MD    Office Visit    10 months ago Glaucoma suspect of both eyes    [facility]    Nurse Only    11 months ago Glaucoma suspect of both eyes    [facility] Mary Wynn MD    Office Visit    1 year ago Diet-controlled diabetes mellitus [facility])    Ning Khalil MD    Office Visit    1 year ago Diet-controlled diabetes mellitus [facility])    Ning Khalil MD    Office Visit

## 2023-06-05 ENCOUNTER — OFFICE VISIT (OUTPATIENT)
Dept: FAMILY MEDICINE CLINIC | Facility: CLINIC | Age: 83
End: 2023-06-05
Payer: MEDICARE

## 2023-06-05 VITALS
BODY MASS INDEX: 44.79 KG/M2 | DIASTOLIC BLOOD PRESSURE: 72 MMHG | HEIGHT: 59 IN | TEMPERATURE: 98 F | RESPIRATION RATE: 22 BRPM | HEART RATE: 78 BPM | SYSTOLIC BLOOD PRESSURE: 126 MMHG | OXYGEN SATURATION: 95 % | WEIGHT: 222.19 LBS

## 2023-06-05 DIAGNOSIS — I50.32 CHRONIC HEART FAILURE WITH PRESERVED EJECTION FRACTION (HCC): Primary | ICD-10-CM

## 2023-06-05 DIAGNOSIS — R54 FRAIL ELDERLY: ICD-10-CM

## 2023-06-05 DIAGNOSIS — I25.10 ATHEROSCLEROSIS OF NATIVE CORONARY ARTERY OF NATIVE HEART WITHOUT ANGINA PECTORIS: ICD-10-CM

## 2023-06-05 DIAGNOSIS — E66.01 CLASS 3 SEVERE OBESITY DUE TO EXCESS CALORIES WITH SERIOUS COMORBIDITY AND BODY MASS INDEX (BMI) OF 40.0 TO 44.9 IN ADULT (HCC): ICD-10-CM

## 2023-06-05 DIAGNOSIS — F41.9 ANXIETY: ICD-10-CM

## 2023-06-05 DIAGNOSIS — J44.9 CHRONIC OBSTRUCTIVE PULMONARY DISEASE, UNSPECIFIED COPD TYPE (HCC): ICD-10-CM

## 2023-06-05 DIAGNOSIS — E87.6 HYPOKALEMIA: ICD-10-CM

## 2023-06-05 DIAGNOSIS — I10 ESSENTIAL HYPERTENSION, BENIGN: ICD-10-CM

## 2023-06-05 PROCEDURE — 1111F DSCHRG MED/CURRENT MED MERGE: CPT | Performed by: FAMILY MEDICINE

## 2023-06-05 PROCEDURE — 99214 OFFICE O/P EST MOD 30 MIN: CPT | Performed by: FAMILY MEDICINE

## 2023-06-05 RX ORDER — CHLORTHALIDONE 25 MG/1
25 TABLET ORAL 2 TIMES DAILY
COMMUNITY
Start: 2023-06-01

## 2023-06-05 NOTE — PATIENT INSTRUCTIONS
Encourage walking, stretching    Continue cardiac care    Monitor diet, hydrate well    Recheck 2 months

## 2023-07-05 RX ORDER — CITALOPRAM HYDROBROMIDE 10 MG/1
TABLET ORAL
Qty: 90 TABLET | Refills: 0 | Status: SHIPPED | OUTPATIENT
Start: 2023-07-05

## 2023-07-05 NOTE — TELEPHONE ENCOUNTER
Last Refill: 04/07/2023 #90 with 0 Refills  Last Px: 1/11/2023        Future appt: Your appointments       Date & Time Appointment Department [facility])    Aug 28, 2023 11:00 AM CDT Follow up - Extended with Micaela Ryan MD [address] [facility] [place]              [address] [facility] [place]  [place] [address] [id number]  [phone]          Last Appointment with provider:   6/5/2023  Last appointment at [facility]:  6/5/2023  Total Cholesterol (mg/dL)   Date Value   01/13/2023 153     HDL Cholesterol (mg/dL)   Date Value   01/13/2023 38     LDL Cholesterol (mg/dL)   Date Value   01/13/2023 96     Triglycerides (mg/dL)   Date Value   01/13/2023 94   01/18/2022 72     No results found for: EAG, A1C  Lab Results   Component Value Date    T4F 1.1 04/06/2021    TSH 4.22 01/13/2023       No follow-ups on file.

## 2023-08-17 ENCOUNTER — TELEPHONE (OUTPATIENT)
Dept: FAMILY MEDICINE CLINIC | Facility: CLINIC | Age: 83
End: 2023-08-17

## 2023-08-17 NOTE — TELEPHONE ENCOUNTER
----- Message from Rohit Brochure sent at 8/17/2023 11:42 AM CDT -----  2nd call.  Patient called and said she is going to go somewhere else today to be seen because she doesn't want to wait to come in

## 2023-08-17 NOTE — TELEPHONE ENCOUNTER
Pt states she it hurts to urinate and there is blood in urine when she goes- nothing avail today or tomorrow= states she can not wait for treatment, wants to talk to nurse

## 2023-08-23 ENCOUNTER — TELEPHONE (OUTPATIENT)
Dept: FAMILY MEDICINE CLINIC | Facility: CLINIC | Age: 83
End: 2023-08-23

## 2023-08-23 LAB
ANION GAP: 7
BUN/CREATININE RATIO: 25.2
BUN: 42
CALCIUM: 9.3
CARBON DIOXIDE: 31
CHLORIDE: 97
CREATININE: 1.65 MG/DL (ref 0.6–?)
GFR NON-AFRICAN AMERICAN: 31
GLUCOSE: 110
PHOSPHORUS: 3.7
POTASSIUM: 3.2
SODIUM: 135

## 2023-08-23 NOTE — TELEPHONE ENCOUNTER
Fax received from [facility]  BMP was drawn on 7/24/23. Fax with message/comments :  FYI: increased Potassium Chloride ER 20meq to 4 daily. BMP entered into flowsheet. Nurse Alba Logan contacted with Dr. Cherie Shaikh office. Pt is scheduled for BMP follow up on Monday, 8/28. Pt is scheduled with Dr. David Levy on 9/13/23.   Pt is currently taking KCL 40meq BID    Nurse states this was just an FYI message to PCP

## 2023-08-28 ENCOUNTER — OFFICE VISIT (OUTPATIENT)
Dept: FAMILY MEDICINE CLINIC | Facility: CLINIC | Age: 83
End: 2023-08-28
Payer: MEDICARE

## 2023-08-28 VITALS
HEART RATE: 78 BPM | SYSTOLIC BLOOD PRESSURE: 130 MMHG | TEMPERATURE: 99 F | RESPIRATION RATE: 24 BRPM | DIASTOLIC BLOOD PRESSURE: 78 MMHG | OXYGEN SATURATION: 95 % | HEIGHT: 59 IN | WEIGHT: 215.88 LBS | BODY MASS INDEX: 43.52 KG/M2

## 2023-08-28 DIAGNOSIS — E78.49 FAMILIAL MULTIPLE LIPOPROTEIN-TYPE HYPERLIPIDEMIA: Primary | ICD-10-CM

## 2023-08-28 DIAGNOSIS — K21.9 GASTROESOPHAGEAL REFLUX DISEASE WITHOUT ESOPHAGITIS: ICD-10-CM

## 2023-08-28 DIAGNOSIS — I48.21 PERMANENT ATRIAL FIBRILLATION (HCC): ICD-10-CM

## 2023-08-28 DIAGNOSIS — F41.9 ANXIETY: ICD-10-CM

## 2023-08-28 DIAGNOSIS — I10 ESSENTIAL HYPERTENSION, BENIGN: ICD-10-CM

## 2023-08-28 DIAGNOSIS — I50.32 CHRONIC HEART FAILURE WITH PRESERVED EJECTION FRACTION (HCC): ICD-10-CM

## 2023-08-28 PROBLEM — N18.30 ANEMIA IN STAGE 3 CHRONIC KIDNEY DISEASE: Status: ACTIVE | Noted: 2023-06-24

## 2023-08-28 PROBLEM — D63.1 ANEMIA IN STAGE 3 CHRONIC KIDNEY DISEASE: Status: ACTIVE | Noted: 2023-06-24

## 2023-08-28 PROCEDURE — 99214 OFFICE O/P EST MOD 30 MIN: CPT | Performed by: FAMILY MEDICINE

## 2023-08-28 RX ORDER — CITALOPRAM 20 MG/1
20 TABLET ORAL DAILY
Qty: 90 TABLET | Refills: 0 | Status: SHIPPED | OUTPATIENT
Start: 2023-08-28

## 2023-08-28 NOTE — PATIENT INSTRUCTIONS
Increase celexa    Continue cardiac care    Continue kidney care    Recheck 2 months    Lab at [facility]

## 2023-09-01 ENCOUNTER — TELEPHONE (OUTPATIENT)
Dept: FAMILY MEDICINE CLINIC | Facility: CLINIC | Age: 83
End: 2023-09-01

## 2023-09-01 LAB
ALBUMIN/GLOBULIN RATIO: 1.1
ALBUMIN: 3.9 G/DL
ALKALINE PHOSPHATASE: 84
ALT: 16
ANION GAP: 10
AST: 23
BILIRUBIN, TOTAL: 1 MG/DL
BUN/CREATININE RATIO: 24.4
BUN: 41
CALCIUM: 9.4
CARBON DIOXIDE: 29
CHLORIDE: 96
CREATININE: 1.69 MG/DL (ref 0.6–1.3)
GFR NON-AFRICAN AMERICAN: 30
GLOBULIN: 3.5
GLUCOSE: 113
HCT: 40.5
HGB: 13.3
MEAN CELL VOLUME: 90.6
MEAN CORPUSCULAR HEMOGLOBIN: 29.8
MEAN CORPUSCULAR HGB CONC: 32.8
MEAN PLATELET VOLUME: 9.3
PLT: 258
POTASSIUM: 3.6
RED BLOOD COUNT: 4.47
RED CELL DISTRIBUTION WIDTH: 13.3
SODIUM: 135
TOTAL PROTEIN: 7.4
TSH: 3.78 UIU/ML
VITAMIN B12: 653 PG/ML
WBC: 8.1

## 2023-09-01 NOTE — TELEPHONE ENCOUNTER
Pt drawn at [facility] on 8/30/23. CBC, CMP, TSH, and B12 levels drawn. Routed for review      Lipid was ordered- but did not appear completed.

## 2023-09-01 NOTE — TELEPHONE ENCOUNTER
Laboratory results reviewed. Patient's kidney function decreased with a GFR of 30. Liver enzymes normal.  Sodium and potassium normal.  Thyroid function normal.  CBC with some mild variation but essentially normal.  B12 level normal.    Lipid profile requested but not resulted. Please contact lab to see if can be added.

## 2023-09-05 ENCOUNTER — TELEPHONE (OUTPATIENT)
Dept: FAMILY MEDICINE CLINIC | Facility: CLINIC | Age: 83
End: 2023-09-05

## 2023-09-05 LAB
CHOL/HDL RATIO: 5.3
HDL CHOLESTEROL: 29 MG/DL
LDL CHOLESTEROL: 104 MG/DL (ref ?–130)
NON HDL CHOL: 126
TOTAL CHOLESTEROL: 155 MG/DL (ref ?–200)
TRIGLYCERIDES: 123 MG/DL

## 2023-09-05 NOTE — TELEPHONE ENCOUNTER
Please notify patient that her total cholesterol is good at 155, good cholesterol (HDLs) is low at 29, bad cholesterol (LDLs) is just slightly high at 104, triglycerides are good at 123.     Recommend increasing activity as tolerated, add fish oil or fish to diet (as long as she is not allergic)

## 2023-09-05 NOTE — TELEPHONE ENCOUNTER
Pt had lipid panel drawn at [facility] On 8/30/23. Ordered per CR. Entered to flow sheet.     Routed to  for review- covering for CR    Pt was seen in office on 8/28/23

## 2023-09-06 NOTE — TELEPHONE ENCOUNTER
LM for pt        Future Appointments   Date Time Provider Cora Palafox   10/24/2023 10:30 AM Maxwell Soares MD [facility]

## 2023-09-18 ENCOUNTER — MED REC SCAN ONLY (OUTPATIENT)
Dept: FAMILY MEDICINE CLINIC | Facility: CLINIC | Age: 83
End: 2023-09-18

## 2023-11-02 NOTE — PROGRESS NOTES
Here for INR check in coumadin clinic in the office. CURRENT COUMADIN DOSAGE:   7mg M,W,F and 6mg daily the rest of the week    COMPLAINTS/CHANGES:  Chest wall pain continues. Not sleeping well due to pain. CT scan done today at [facility] for chest pain. Previously Declined (within the last year)

## 2024-02-16 NOTE — PROGRESS NOTES
INR checked in office in coumadin clinic  INR in goal range at 2.6  CPM coumadin 6mg M and 8mg daily the rest of the week. Upcoming dermatology excision and plastic surgery in February  Derm did not require her to hold coumadin.  Patient will call plastic
4 = No assist / stand by assistance

## 2024-02-22 NOTE — PROGRESS NOTES
Here for INR check in coumadin clinic in the office. CURRENT COUMADIN DOSE:  Held for breast biopsy. Resumed coumadin on 8/28/20 in the evening. Lovenox bridge per cardiology, Dr. Mg Jesus. Took last injection she had yesterday.    Coumadin dose 8mg M,F a
Sonia Jones at Dr. Jm Redd office returned call. She will discuss INR with the nurse practitioner and call back with coumadin and lovenox intructions.      Per nurse practitioner, Tammy Marin, at 's office, patient to continue Lovenox BID until INR is 2.0 or
(4) rarely moist

## 2024-03-18 NOTE — TELEPHONE ENCOUNTER
Patient had colonoscopy on 2/27/18. Many polyps removed. Surgeon requested patient to wait to resume lovenox until this evening. Patient denies any bleeding.      Patient also scheduled for eyelid excision on 3/5/18 and plastic surgery repair of eyelid [de-identified] : DIANE DEVI is a 65 year old F who initially presented w the cc of having abdominal wall discomfort, w a bulge, found to have abdominal wall hernia.  PMH: HTN, Arthritis; Asthma  PSH: ; S/P cholecystectomy,  @ [facility], Knee surgery   Here today for postop, S/P robotic-assisted laparoscopic repair of incisional hernia with mesh 24.  Patient is without reported complaints. Denies abdominal pain. No nausea/vomiting. No fevers/chills. Patient is tolerating a regular diet with normal appetite. Normal BM's.

## 2024-10-21 NOTE — PROGRESS NOTES
Reviewed INR. Patient currently being treated for pneumonia. Patient to have follow-up INR 1 week. Patient to continue current dose of Coumadin at this time. Detail Level: Zone Add 68422 Cpt? (Important Note: In 2017 The Use Of 79644 Is Being Tracked By Cms To Determine Future Global Period Reimbursement For Global Periods): yes

## (undated) DIAGNOSIS — Z79.01 LONG TERM (CURRENT) USE OF ANTICOAGULANTS: ICD-10-CM

## (undated) DIAGNOSIS — I48.21 PERMANENT ATRIAL FIBRILLATION (HCC): ICD-10-CM

## (undated) DIAGNOSIS — G47.33 OBSTRUCTIVE SLEEP APNEA: Primary | ICD-10-CM

## (undated) DIAGNOSIS — L03.114 CELLULITIS OF LEFT UPPER EXTREMITY: Primary | ICD-10-CM

## (undated) DIAGNOSIS — S40.022D HEMATOMA OF ARM, LEFT, SUBSEQUENT ENCOUNTER: ICD-10-CM

## (undated) NOTE — MR AVS SNAPSHOT
[address]  [place] [id number]  [phone]               Thank you for choosing us for your health care visit with [facility].   We are glad to serve you and happy to provide you with this summary of Meryle Broccoli Tue Wed Thu Fri Sat           1      8 mg           2      8 mg         3      8 mg         4      8 mg         5      8 mg         6      8 mg         7   3.1   6 mg   See details      8      8 mg           9      8 mg         10      8 mg LOTRISONE 1-0.05 % Crea   Generic drug:  clotrimazole-betamethasone   Apply topically. Apply to affected are tid as needed           Lovastatin 20 MG Tabs   Take 20 mg by mouth daily. methylPREDNISolone 4 MG Tbpk   As directed.    Commonly known

## (undated) NOTE — LETTER
04/01/21        [place] Painter [id number]      Dear Judi Goldmann,    [facility] records indicate that you have outstanding lab work and or testing that was ordered for you and has not yet been completed:  Orders Placed This Encounter

## (undated) NOTE — MR AVS SNAPSHOT
[address]  [place] [phone]               Thank you for choosing us for your health care visit with ZAHRAA Snell.   We are glad to serve you and happy to provide you with this summary of yo Take 1 mg by mouth. 2 tabs daily           furosemide 20 MG Tabs   Take 20 mg by mouth daily. Commonly known as:  LASIX           Hydrocortisone 2.5 % Lotn   Apply 1 Application topically 2 (two) times daily.  Apply sparingly to affected area twice a day Visit [facility] online at  [email or web address]

## (undated) NOTE — MR AVS SNAPSHOT
[address]  [place] [phone]               Thank you for choosing us for your health care visit with [facility]SE.   We are glad to serve you and happy to provide you with this summary of 4      8 mg           5      8 mg         6      8 mg         7      8 mg         8      8 mg         9      8 mg         10      8 mg         11                 12               13               14               15               16               17 medical emergencies, dial 911. Educational Information     Healthy Diet and Regular Exercise  [employer] for making healthy food choices  -   Enjoy your food, but eat less. Fully enjoy your food when eating.    Don’t eat w

## (undated) NOTE — MR AVS SNAPSHOT
[address] [place]  [place] [address] [address]  [phone]               Thank you for choosing us for your health care visit with ZAHRAA Stark.   We are glad to serve you and happy to provide you with this summary of yo Generic drug:  clotrimazole-betamethasone   Apply topically. Apply to affected are tid as needed           Lovastatin 20 MG Tabs   Take 20 mg by mouth daily. Mupirocin Calcium 2 % Crea   Apply 1 Application topically 2 (two) times daily.  Apply tw

## (undated) NOTE — MR AVS SNAPSHOT
[address]  [place] [address] [phone]               Thank you for choosing us for your health care visit with [facility].   We are glad to serve you and happy to provide you with this summary of 8 mg   See details      25      8 mg           26      8 mg         27      8 mg         28      8 mg         29      8 mg         30      8 mg         31      8 mg           Date Details   03/17 Last INR check   INR: 1.8      03/24 This INR check   INR: 2 affected area twice a day as needed           lisinopril 10 MG Tabs   Take 10 mg by mouth daily. Commonly known as:  PRINIVIL,ZESTRIL           LOTRISONE 1-0.05 % Crea   Generic drug:  clotrimazole-betamethasone   Apply topically.  Apply to affected are t

## (undated) NOTE — MR AVS SNAPSHOT
[address]  [place] [address] [address] [phone]               Thank you for choosing us for your health care visit with [facility] N[facility].   We are glad to serve you and happy to provide you with this summary of 03/09 Last INR check   INR: 2.8   inr managed/lj 03/11 This INR check   INR: 3.3   inr managed/lj      Date of next INR: No date specified         How to take your warfarin dose     To take:  4 mg Take half of a 6 mg tablet and one of the 1 mg tablets Component Value Standard Range & Units    INR 3.3 0.8 - 1.2    inr managed/lj                  Hema     Sign up for [employer], your secure online medical record.   [employer] will allow you to access patient instructions from your recent visit,  view other

## (undated) NOTE — MR AVS SNAPSHOT
[address] [phone]               Thank you for choosing us for your health care visit with [facility].   We are glad to serve you and happy to provide you with this summary of 8 mg              Date Details   02/10 Last INR check   INR: 1.9   INR managed/lj                 March 2017 Details    Suzi Aceves Thu Fri Sat        1      8 mg         2      8 mg         3      8 mg         4      8 mg           5      8 mg Apply topically. Apply to affected are tid as needed           Lovastatin 20 MG Tabs   Take 20 mg by mouth daily. methylPREDNISolone 4 MG Tbpk   As directed.    Commonly known as:  MEDROL           ofloxacin 0.3 % Soln   Place 10 drops into both e

## (undated) NOTE — MR AVS SNAPSHOT
[address] [place]  [place] [address] [phone]  [phone]               Thank you for choosing us for your health care visit with [facility]SE.   We are glad to serve you and happy to provide you with this summary of 21      8 mg         22      8 mg         23      8 mg         24      8 mg         25      8 mg         26      8 mg         27      8 mg           28      8 mg         29      8 mg         30      8 mg         31      8 mg             Date Details   05 To take:  8 mg Take one of the 6 mg tablets and two of the 1 mg tablets.              Description          New coumadin dose: CPM 8mg daily                    Today's Vital Signs     BP Pulse Temp             130/70 mmHg 70 98.1 °F (36.7 °C) (Tympanic) not sign up before the expiration date, you must request a new code. Your unique [employer] Access Code: [email or web address]  Expires: 8/18/2017  2:17 PM    If you have questions, you can call [phone] to talk to our [facility] Staff.  Remember, [employer]

## (undated) NOTE — MR AVS SNAPSHOT
[address]  [place] [address] [phone]  [phone]               Thank you for choosing us for your health care visit with [facility] COUMADIN NURSE.   We are glad to serve you and happy to provide you with this summary of 30      8 mg                Date Details   04/07 Last INR check   INR: 3.1   inr managed/lj      04/20 This INR check   INR: 2.8   INR managed/lj               How to take your warfarin dose     To take:  8 mg Take one of the 6 mg tablets and two of the lisinopril 10 MG Tabs   TAKE ONE TABLET BY MOUTH EVERY DAY   Commonly known as:  PRINIVIL,ZESTRIL           Lovastatin 20 MG Tabs   Take 20 mg by mouth daily. * Notice:   This list has 2 medication(s) that are the same as other medications prescr

## (undated) NOTE — LETTER
02/09/21        [address] [place] [address]      Dear Don Baker,    [facility] records indicate that you have outstanding lab work and or testing that was ordered for you and has not yet been completed:  Orders Placed This Encounter

## (undated) NOTE — LETTER
02/07/20        [place] [id number]      Dear Samira Barron,    [facility] records indicate that you have outstanding lab work and or testing that was ordered for you and has not yet been completed:  Orders Placed This Encounter

## (undated) NOTE — MR AVS SNAPSHOT
[address]  [id number]  [phone]               Thank you for choosing us for your health care visit with [facility].   We are glad to serve you and happy to provide you with this summary of This list is accurate as of: 3/17/17  1:23 PM.  Always use your most recent med list.                CARDIZEM 60 MG Tabs   Generic drug:  DilTIAZem HCl   Take 60 mg by mouth daily.            * COUMADIN 6 MG Tabs   Generic drug:  Warfarin Sodium   Ta Expires: 4/11/2017 10:34 AM    If you have questions, you can call [phone] to talk to our [facility] Staff. Remember, [employer] is NOT to be used for urgent needs. For medical emergencies, dial 911.            Visit [employer]

## (undated) NOTE — LETTER
09/15/20        [address]  [facility] [place] [id number]      Dear Kobi Casarez,    [address] [facility] records indicate that you have outstanding lab work and or testing that was ordered for you and has not yet been completed:  Orders Placed This Encounter